# Patient Record
Sex: MALE | Race: WHITE | NOT HISPANIC OR LATINO | Employment: OTHER | URBAN - METROPOLITAN AREA
[De-identification: names, ages, dates, MRNs, and addresses within clinical notes are randomized per-mention and may not be internally consistent; named-entity substitution may affect disease eponyms.]

---

## 2017-02-02 ENCOUNTER — HOSPITAL ENCOUNTER (OUTPATIENT)
Facility: MEDICAL CENTER | Age: 82
End: 2017-02-02
Attending: SURGERY
Payer: MEDICARE

## 2017-02-02 PROCEDURE — 88304 TISSUE EXAM BY PATHOLOGIST: CPT

## 2017-02-03 LAB — PATHOLOGY CONSULT NOTE: NORMAL

## 2017-05-23 DIAGNOSIS — I11.9 BENIGN HYPERTENSIVE HEART DISEASE WITHOUT HEART FAILURE: ICD-10-CM

## 2017-05-23 DIAGNOSIS — I25.10 ATHEROSCLEROSIS OF NATIVE CORONARY ARTERY OF NATIVE HEART WITHOUT ANGINA PECTORIS: ICD-10-CM

## 2017-05-23 DIAGNOSIS — N18.30 CKD (CHRONIC KIDNEY DISEASE) STAGE 3, GFR 30-59 ML/MIN (HCC): ICD-10-CM

## 2017-05-23 DIAGNOSIS — E78.00 HYPERCHOLESTEROLEMIA: ICD-10-CM

## 2017-05-26 ENCOUNTER — HOSPITAL ENCOUNTER (OUTPATIENT)
Dept: LAB | Facility: MEDICAL CENTER | Age: 82
End: 2017-05-26
Attending: INTERNAL MEDICINE
Payer: MEDICARE

## 2017-05-26 DIAGNOSIS — I25.10 ATHEROSCLEROSIS OF NATIVE CORONARY ARTERY OF NATIVE HEART WITHOUT ANGINA PECTORIS: ICD-10-CM

## 2017-05-26 DIAGNOSIS — E78.00 HYPERCHOLESTEROLEMIA: ICD-10-CM

## 2017-05-26 DIAGNOSIS — N18.30 CKD (CHRONIC KIDNEY DISEASE) STAGE 3, GFR 30-59 ML/MIN (HCC): ICD-10-CM

## 2017-05-26 DIAGNOSIS — I11.9 BENIGN HYPERTENSIVE HEART DISEASE WITHOUT HEART FAILURE: ICD-10-CM

## 2017-05-26 LAB
ALBUMIN SERPL BCP-MCNC: 3.9 G/DL (ref 3.2–4.9)
ALBUMIN/GLOB SERPL: 1.3 G/DL
ALP SERPL-CCNC: 56 U/L (ref 30–99)
ALT SERPL-CCNC: 15 U/L (ref 2–50)
ANION GAP SERPL CALC-SCNC: 5 MMOL/L (ref 0–11.9)
AST SERPL-CCNC: 18 U/L (ref 12–45)
BASOPHILS # BLD AUTO: 1.1 % (ref 0–1.8)
BASOPHILS # BLD: 0.05 K/UL (ref 0–0.12)
BILIRUB SERPL-MCNC: 0.9 MG/DL (ref 0.1–1.5)
BUN SERPL-MCNC: 45 MG/DL (ref 8–22)
CALCIUM SERPL-MCNC: 9.3 MG/DL (ref 8.5–10.5)
CHLORIDE SERPL-SCNC: 108 MMOL/L (ref 96–112)
CHOLEST SERPL-MCNC: 131 MG/DL (ref 100–199)
CO2 SERPL-SCNC: 25 MMOL/L (ref 20–33)
CREAT SERPL-MCNC: 1.86 MG/DL (ref 0.5–1.4)
EOSINOPHIL # BLD AUTO: 0.34 K/UL (ref 0–0.51)
EOSINOPHIL NFR BLD: 7.1 % (ref 0–6.9)
ERYTHROCYTE [DISTWIDTH] IN BLOOD BY AUTOMATED COUNT: 46 FL (ref 35.9–50)
GFR SERPL CREATININE-BSD FRML MDRD: 34 ML/MIN/1.73 M 2
GLOBULIN SER CALC-MCNC: 2.9 G/DL (ref 1.9–3.5)
GLUCOSE SERPL-MCNC: 87 MG/DL (ref 65–99)
HCT VFR BLD AUTO: 45.2 % (ref 42–52)
HDLC SERPL-MCNC: 58 MG/DL
HGB BLD-MCNC: 15 G/DL (ref 14–18)
IMM GRANULOCYTES # BLD AUTO: 0 K/UL (ref 0–0.11)
IMM GRANULOCYTES NFR BLD AUTO: 0 % (ref 0–0.9)
LDLC SERPL CALC-MCNC: 58 MG/DL
LYMPHOCYTES # BLD AUTO: 1.39 K/UL (ref 1–4.8)
LYMPHOCYTES NFR BLD: 29.2 % (ref 22–41)
MCH RBC QN AUTO: 32.9 PG (ref 27–33)
MCHC RBC AUTO-ENTMCNC: 33.2 G/DL (ref 33.7–35.3)
MCV RBC AUTO: 99.1 FL (ref 81.4–97.8)
MONOCYTES # BLD AUTO: 0.35 K/UL (ref 0–0.85)
MONOCYTES NFR BLD AUTO: 7.4 % (ref 0–13.4)
NEUTROPHILS # BLD AUTO: 2.63 K/UL (ref 1.82–7.42)
NEUTROPHILS NFR BLD: 55.2 % (ref 44–72)
NRBC # BLD AUTO: 0 K/UL
NRBC BLD AUTO-RTO: 0 /100 WBC
PLATELET # BLD AUTO: 99 K/UL (ref 164–446)
PMV BLD AUTO: 12.3 FL (ref 9–12.9)
POTASSIUM SERPL-SCNC: 4.3 MMOL/L (ref 3.6–5.5)
PROT SERPL-MCNC: 6.8 G/DL (ref 6–8.2)
RBC # BLD AUTO: 4.56 M/UL (ref 4.7–6.1)
SODIUM SERPL-SCNC: 138 MMOL/L (ref 135–145)
TRIGL SERPL-MCNC: 74 MG/DL (ref 0–149)
WBC # BLD AUTO: 4.8 K/UL (ref 4.8–10.8)

## 2017-05-26 PROCEDURE — 80053 COMPREHEN METABOLIC PANEL: CPT

## 2017-05-26 PROCEDURE — 85025 COMPLETE CBC W/AUTO DIFF WBC: CPT

## 2017-05-26 PROCEDURE — 36415 COLL VENOUS BLD VENIPUNCTURE: CPT

## 2017-05-26 PROCEDURE — 80061 LIPID PANEL: CPT

## 2017-05-30 ENCOUNTER — OFFICE VISIT (OUTPATIENT)
Dept: CARDIOLOGY | Facility: MEDICAL CENTER | Age: 82
End: 2017-05-30
Payer: MEDICARE

## 2017-05-30 VITALS — HEART RATE: 56 BPM | SYSTOLIC BLOOD PRESSURE: 125 MMHG | DIASTOLIC BLOOD PRESSURE: 60 MMHG

## 2017-05-30 DIAGNOSIS — I73.9 PERIPHERAL VASCULAR DISEASE (HCC): ICD-10-CM

## 2017-05-30 DIAGNOSIS — I25.10 ATHEROSCLEROSIS OF NATIVE CORONARY ARTERY OF NATIVE HEART WITHOUT ANGINA PECTORIS: ICD-10-CM

## 2017-05-30 DIAGNOSIS — Z95.5 PRESENCE OF BARE METAL STENT IN LEFT CIRCUMFLEX CORONARY ARTERY: ICD-10-CM

## 2017-05-30 DIAGNOSIS — I11.9 BENIGN HYPERTENSIVE HEART DISEASE WITHOUT HEART FAILURE: ICD-10-CM

## 2017-05-30 PROCEDURE — 1101F PT FALLS ASSESS-DOCD LE1/YR: CPT | Mod: 8P | Performed by: INTERNAL MEDICINE

## 2017-05-30 PROCEDURE — 99213 OFFICE O/P EST LOW 20 MIN: CPT | Performed by: INTERNAL MEDICINE

## 2017-05-30 PROCEDURE — 4040F PNEUMOC VAC/ADMIN/RCVD: CPT | Mod: 8P | Performed by: INTERNAL MEDICINE

## 2017-05-30 PROCEDURE — G8598 ASA/ANTIPLAT THER USED: HCPCS | Performed by: INTERNAL MEDICINE

## 2017-05-30 PROCEDURE — G8432 DEP SCR NOT DOC, RNG: HCPCS | Performed by: INTERNAL MEDICINE

## 2017-05-30 PROCEDURE — G8421 BMI NOT CALCULATED: HCPCS | Performed by: INTERNAL MEDICINE

## 2017-05-30 PROCEDURE — 1036F TOBACCO NON-USER: CPT | Performed by: INTERNAL MEDICINE

## 2017-05-30 ASSESSMENT — ENCOUNTER SYMPTOMS
COUGH: 0
MYALGIAS: 0
WHEEZING: 0
PND: 0
ORTHOPNEA: 0
FALLS: 0

## 2017-05-30 ASSESSMENT — LIFESTYLE VARIABLES: SUBSTANCE_ABUSE: 0

## 2017-05-30 NOTE — MR AVS SNAPSHOT
Luis Antonio Turner MD   2017 1:20 PM   Office Visit   MRN: [id number]    Department:  Heart [facility]   Dept Phone:  [phone]    Description:  Male : 1928   Provider:  Luis Antonio Lugo M.D.           Allergies as of 2017     No Known Allergies      You were diagnosed with     Peripheral vascular disease (CMS-HCC)   [735797]         Vital Signs     Smoking Status                   Never Smoker            Basic Information     Date Of Birth Sex Race Ethnicity Preferred Language    1928 Male White Non- English      Your appointments     Sep 05, 2017 11:00 AM   FOLLOW UP with Luis Antonio Lugo M.D.   [facility]    [address]  [address]  [facility] [id number] [phone]              Problem List              ICD-10-CM Priority Class Noted - Resolved    Benign hypertensive heart disease without heart failure I11.9   Unknown - Present    Coronary atherosclerosis of native coronary artery I25.10   Unknown - Present    Peripheral vascular disease (CMS-HCC) I73.9   Unknown - Present    Malignant neoplasm of prostate (CMS-HCC) C61   Unknown - Present    Presence of bare metal stent in left circumflex coronary artery, 4mm Duet, 99 Z95.5   2011 - Present    CKD (chronic kidney disease) stage 3, GFR 30-59 ml/min N18.3   2012 - Present    Hypercholesterolemia E78.00   2012 - Present    Palpitations R00.2   2012 - Present    PVD (peripheral vascular disease) (CMS-HCC) I73.9   2012 - Present    Prostate cancer (CMS-HCC) C61   2012 - Present    Carotid artery bruit R09.89   4/15/2014 - Present    Other chest pain R07.89   2015 - Present    Aortic valve sclerosis I35.8   2016 - Present    Mild mitral regurgitation I34.0   2016 - Present      Health Maintenance        Date Due Completion Dates    IMM DTaP/Tdap/Td Vaccine (1 - Tdap) 1947 ---    COLONOSCOPY 1978  ---    IMM ZOSTER VACCINE 1/31/1988 ---    IMM PNEUMOCOCCAL 65+ (ADULT) LOW/MEDIUM RISK SERIES (1 of 2 - PCV13) 1/31/1993 ---            Current Immunizations     No immunizations on file.      Below and/or attached are the medications your provider expects you to take. Review all of your home medications and newly ordered medications with your provider and/or pharmacist. Follow medication instructions as directed by your provider and/or pharmacist. Please keep your medication list with you and share with your provider. Update the information when medications are discontinued, doses are changed, or new medications (including over-the-counter products) are added; and carry medication information at all times in the event of emergency situations     Allergies:  No Known Allergies          Medications  Valid as of: May 30, 2017 -  2:02 PM    Generic Name Brand Name Tablet Size Instructions for use    Aspirin (Tablet Delayed Response) ECOTRIN 81 MG Take 81 mg by mouth every day.        Atorvastatin Calcium (Tab) LIPITOR 20 MG TAKE 1 TABLET BY MOUTH EVERY DAY        Cholecalciferol   Take 1,000 Units by mouth every Monday, Wednesday, and Friday.        HydroCHLOROthiazide (Tab) HYDRODIURIL 12.5 MG Take 1 Tab by mouth every day.        Losartan Potassium (Tab) COZAAR 50 MG TAKE 1 TABLET BY MOUTH EVERY DAY        Lutein   Take 20 mg by mouth every day.        .                 Medicines prescribed today were sent to:     [facility] [place], [place] - [address] [address]    Phone: [phone] Fax: [phone]    Open 24 Hours?: No    [facility] # [id number] - [place], [place] - [address] [facility]    [address] [facility] [address]    Phone: [phone] Fax: [phone]    Open 24 Hours?: No      Medication refill instructions:       If your prescription bottle indicates you have medication refills left, it is not necessary to call your provider’s office. Please contact your  pharmacy and they will refill your medication.    If your prescription bottle indicates you do not have any refills left, you may request refills at any time through one of the following ways: The online Hydrocapsule system (except Urgent Care), by calling your provider’s office, or by asking your pharmacy to contact your provider’s office with a refill request. Medication refills are processed only during regular business hours and may not be available until the next business day. Your provider may request additional information or to have a follow-up visit with you prior to refilling your medication.   *Please Note: Medication refills are assigned a new Rx number when refilled electronically. Your pharmacy may indicate that no refills were authorized even though a new prescription for the same medication is available at the pharmacy. Please request the medicine by name with the pharmacy before contacting your provider for a refill.        Your To Do List     Future Labs/Procedures Complete By Expires    ABDOMINAL AORTA ULTRASOUND  As directed 5/31/2018         Hydrocapsule Access Code: [email or web address]  Expires: 6/29/2017  2:02 PM    Hydrocapsule  A secure, online tool to manage your health information     [employer]’s Hydrocapsule® is a secure, online tool that connects you to your personalized health information from the privacy of your home -- day or night - making it very easy for you to manage your healthcare. Once the activation process is completed, you can even access your medical information using the Hydrocapsule sylvia, which is available for free in the Apple Sylvia store or Google Play store.     Hydrocapsule provides the following levels of access (as shown below):   My Chart Features   [facility]  Specialists [facility]  Urgent  Care Non-[facility]  Primary Care  Doctor   Email your healthcare team securely and privately 24/7 X X X    Manage appointments: schedule your next appointment; view details of past/upcoming  appointments X      Request prescription refills. X      View recent personal medical records, including lab and immunizations X X X X   View health record, including health history, allergies, medications X X X X   Read reports about your outpatient visits, procedures, consult and ER notes X X X X   See your discharge summary, which is a recap of your hospital and/or ER visit that includes your diagnosis, lab results, and care plan. X X       How to register for Ideabove:  1. Go to  [email or web address].  2. Click on the Sign Up Now box, which takes you to the New Member Sign Up page. You will need to provide the following information:  a. Enter your Ideabove Access Code exactly as it appears at the top of this page. (You will not need to use this code after you’ve completed the sign-up process. If you do not sign up before the expiration date, you must request a new code.)   b. Enter your date of birth.   c. Enter your home email address.   d. Click Submit, and follow the next screen’s instructions.  3. Create a Ideabove ID. This will be your Ideabove login ID and cannot be changed, so think of one that is secure and easy to remember.  4. Create a Ideabove password. You can change your password at any time.  5. Enter your Password Reset Question and Answer. This can be used at a later time if you forget your password.   6. Enter your e-mail address. This allows you to receive e-mail notifications when new information is available in Ideabove.  7. Click Sign Up. You can now view your health information.    For assistance activating your Ideabove account, call [phone]

## 2017-05-30 NOTE — PROGRESS NOTES
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 He does have bilateral buttock claudication with walking 200 feet which resolves with resting. This is bilaterally symmetrical. He does not get calf claudication on either side. He has had no angina. Dyspnea with effort is functional class I-II and stable.    Past Medical History   Diagnosis Date   • Benign hypertensive heart disease without heart failure    • Coronary atherosclerosis of native coronary artery    • Chronic kidney disease, stage III (moderate)    • Palpitations    • Peripheral vascular disease, unspecified (CMS-HCC)    • Malignant neoplasm of prostate (CMS-HCC)    • Stented coronary artery 6/1/2012   • Benign essential HTN 6/1/2012   • CAD (coronary artery disease) 6/1/2012   • CKD (chronic kidney disease) stage 3, GFR 30-59 ml/min 6/1/2012   • Hypercholesterolemia 6/1/2012   • Palpitations 6/1/2012   • PVD (peripheral vascular disease) (CMS-HCC) 6/1/2012   • Prostate cancer (CMS-HCC) 6/1/2012     Past Surgical History   Procedure Laterality Date   • Other cardiac surgery       angioplasty and stent of the proximal circumflex coronary artery for acute MI     Family History   Problem Relation Age of Onset   • Heart Attack Father 60     History   Smoking status   • Never Smoker    Smokeless tobacco   • Never Used     No Known Allergies  Outpatient Encounter Prescriptions as of 5/30/2017   Medication Sig Dispense Refill   • hydrochlorothiazide (HYDRODIURIL) 12.5 MG tablet Take 1 Tab by mouth every day. 90 Tab 3   • losartan (COZAAR) 50 MG Tab TAKE 1 TABLET BY MOUTH EVERY DAY 90 Tab 3   • atorvastatin (LIPITOR) 20 MG Tab TAKE 1 TABLET BY MOUTH EVERY  Tab 3   • LUTEIN Take 20 mg by mouth every day.     • VITAMIN D, CHOLECALCIFEROL, PO Take 1,000 Units by mouth every Monday, Wednesday, and Friday.     • aspirin EC (ECOTRIN) 81 MG TBEC Take 81 mg by mouth every day.       No  facility-administered encounter medications on file as of 5/30/2017.     Review of Systems   Respiratory: Negative for cough and wheezing.    Cardiovascular: Negative for orthopnea and PND.   Musculoskeletal: Negative for myalgias and falls.   Psychiatric/Behavioral: Negative for substance abuse.        Objective:   /60 mmHg  Pulse 56    Physical Exam   Constitutional: He is oriented to person, place, and time. He appears well-developed and well-nourished.   Eyes: Conjunctivae are normal. No scleral icterus.   Neck: No JVD present.   Cardiovascular: Normal rate, regular rhythm, S1 normal and S2 normal.  Exam reveals no gallop.    Murmur (2/6 systolic ejection) heard.  Pulses:       Radial pulses are 2+ on the right side, and 2+ on the left side.        Femoral pulses are 2+ on the right side, and 2+ on the left side with bruit.       Posterior tibial pulses are 2+ on the right side, and 1+ on the left side.   Musculoskeletal: He exhibits no edema.   Neurological: He is alert and oriented to person, place, and time.   Skin: Skin is warm and dry.   Psychiatric: He has a normal mood and affect. His behavior is normal. Judgment and thought content normal.       Assessment:     1. Atherosclerosis of native coronary artery of native heart without angina pectoris     2. Presence of bare metal stent in left circumflex coronary artery, 4mm Duet, 4/9/99     3. Benign hypertensive heart disease without heart failure     4. Peripheral vascular disease (CMS-[facility])  ABDOMINAL AORTA ULTRASOUND   His coronary status is stable. Blood pressure control is very irritable and labile but generally good. Fatigue with effort is stable. The buttock claudication may be due to iliac arterial disease since it is symmetrical and he does not get lower extremity claudication despite the asymmetrical pulses.  Medical Decision Making:  Today's Assessment / Status / Plan:     Obtain the abdominal vascular ultrasound to evaluate the proximal  iliac arteries and his abdominal aorta. Continue the current cardiovascular regimen. Cardiology follow up in  3 month(s) and  sooner if needed for any change.   Lab before next visit.

## 2017-06-09 DIAGNOSIS — I10 ESSENTIAL HYPERTENSION: ICD-10-CM

## 2017-06-09 RX ORDER — LOSARTAN POTASSIUM 50 MG/1
50 TABLET ORAL
Qty: 90 TAB | Refills: 1 | OUTPATIENT
Start: 2017-06-09 | End: 2017-12-20 | Stop reason: SDUPTHER

## 2017-08-14 DIAGNOSIS — I73.9 PVD (PERIPHERAL VASCULAR DISEASE) (HCC): ICD-10-CM

## 2017-08-16 ENCOUNTER — APPOINTMENT (OUTPATIENT)
Dept: RADIOLOGY | Facility: MEDICAL CENTER | Age: 82
End: 2017-08-16
Attending: EMERGENCY MEDICINE
Payer: MEDICARE

## 2017-08-16 ENCOUNTER — APPOINTMENT (OUTPATIENT)
Dept: RADIOLOGY | Facility: MEDICAL CENTER | Age: 82
End: 2017-08-16
Attending: HOSPITALIST
Payer: MEDICARE

## 2017-08-16 ENCOUNTER — HOSPITAL ENCOUNTER (OUTPATIENT)
Facility: MEDICAL CENTER | Age: 82
End: 2017-08-18
Attending: EMERGENCY MEDICINE | Admitting: HOSPITALIST
Payer: MEDICARE

## 2017-08-16 ENCOUNTER — RESOLUTE PROFESSIONAL BILLING HOSPITAL PROF FEE (OUTPATIENT)
Dept: HOSPITALIST | Facility: MEDICAL CENTER | Age: 82
End: 2017-08-16
Payer: MEDICARE

## 2017-08-16 DIAGNOSIS — R27.0 ATAXIA: ICD-10-CM

## 2017-08-16 LAB
ALBUMIN SERPL BCP-MCNC: 3.7 G/DL (ref 3.2–4.9)
ALBUMIN/GLOB SERPL: 1.5 G/DL
ALP SERPL-CCNC: 53 U/L (ref 30–99)
ALT SERPL-CCNC: 11 U/L (ref 2–50)
ANION GAP SERPL CALC-SCNC: 10 MMOL/L (ref 0–11.9)
APPEARANCE UR: CLEAR
APTT PPP: 24.2 SEC (ref 24.7–36)
AST SERPL-CCNC: 18 U/L (ref 12–45)
BASOPHILS # BLD AUTO: 0.6 % (ref 0–1.8)
BASOPHILS # BLD: 0.03 K/UL (ref 0–0.12)
BILIRUB SERPL-MCNC: 0.9 MG/DL (ref 0.1–1.5)
BILIRUB UR QL STRIP.AUTO: NEGATIVE
BUN SERPL-MCNC: 36 MG/DL (ref 8–22)
CALCIUM SERPL-MCNC: 9 MG/DL (ref 8.5–10.5)
CHLORIDE SERPL-SCNC: 109 MMOL/L (ref 96–112)
CO2 SERPL-SCNC: 22 MMOL/L (ref 20–33)
COLOR UR: YELLOW
CREAT SERPL-MCNC: 1.58 MG/DL (ref 0.5–1.4)
CULTURE IF INDICATED INDCX: NO UA CULTURE
EOSINOPHIL # BLD AUTO: 0.45 K/UL (ref 0–0.51)
EOSINOPHIL NFR BLD: 8.9 % (ref 0–6.9)
ERYTHROCYTE [DISTWIDTH] IN BLOOD BY AUTOMATED COUNT: 45.9 FL (ref 35.9–50)
EST. AVERAGE GLUCOSE BLD GHB EST-MCNC: 117 MG/DL
GFR SERPL CREATININE-BSD FRML MDRD: 41 ML/MIN/1.73 M 2
GLOBULIN SER CALC-MCNC: 2.4 G/DL (ref 1.9–3.5)
GLUCOSE SERPL-MCNC: 88 MG/DL (ref 65–99)
GLUCOSE UR STRIP.AUTO-MCNC: NEGATIVE MG/DL
HBA1C MFR BLD: 5.7 % (ref 0–5.6)
HCT VFR BLD AUTO: 40.7 % (ref 42–52)
HGB BLD-MCNC: 13.9 G/DL (ref 14–18)
IMM GRANULOCYTES # BLD AUTO: 0.01 K/UL (ref 0–0.11)
IMM GRANULOCYTES NFR BLD AUTO: 0.2 % (ref 0–0.9)
INR PPP: 1 (ref 0.87–1.13)
KETONES UR STRIP.AUTO-MCNC: NEGATIVE MG/DL
LEUKOCYTE ESTERASE UR QL STRIP.AUTO: NEGATIVE
LYMPHOCYTES # BLD AUTO: 1.74 K/UL (ref 1–4.8)
LYMPHOCYTES NFR BLD: 34.3 % (ref 22–41)
MCH RBC QN AUTO: 33.5 PG (ref 27–33)
MCHC RBC AUTO-ENTMCNC: 34.2 G/DL (ref 33.7–35.3)
MCV RBC AUTO: 98.1 FL (ref 81.4–97.8)
MICRO URNS: NORMAL
MONOCYTES # BLD AUTO: 0.42 K/UL (ref 0–0.85)
MONOCYTES NFR BLD AUTO: 8.3 % (ref 0–13.4)
NEUTROPHILS # BLD AUTO: 2.42 K/UL (ref 1.82–7.42)
NEUTROPHILS NFR BLD: 47.7 % (ref 44–72)
NITRITE UR QL STRIP.AUTO: NEGATIVE
NRBC # BLD AUTO: 0 K/UL
NRBC BLD AUTO-RTO: 0 /100 WBC
PH UR STRIP.AUTO: 7 [PH]
PLATELET # BLD AUTO: 101 K/UL (ref 164–446)
PMV BLD AUTO: 12 FL (ref 9–12.9)
POTASSIUM SERPL-SCNC: 3.9 MMOL/L (ref 3.6–5.5)
PROT SERPL-MCNC: 6.1 G/DL (ref 6–8.2)
PROT UR QL STRIP: NEGATIVE MG/DL
PROTHROMBIN TIME: 13.5 SEC (ref 12–14.6)
RBC # BLD AUTO: 4.15 M/UL (ref 4.7–6.1)
RBC UR QL AUTO: NEGATIVE
SODIUM SERPL-SCNC: 141 MMOL/L (ref 135–145)
SP GR UR STRIP.AUTO: 1.01
TROPONIN I SERPL-MCNC: 0.02 NG/ML (ref 0–0.04)
UROBILINOGEN UR STRIP.AUTO-MCNC: 1 MG/DL
WBC # BLD AUTO: 5.1 K/UL (ref 4.8–10.8)

## 2017-08-16 PROCEDURE — 700102 HCHG RX REV CODE 250 W/ 637 OVERRIDE(OP): Performed by: NURSE PRACTITIONER

## 2017-08-16 PROCEDURE — 93005 ELECTROCARDIOGRAM TRACING: CPT | Performed by: EMERGENCY MEDICINE

## 2017-08-16 PROCEDURE — G0378 HOSPITAL OBSERVATION PER HR: HCPCS

## 2017-08-16 PROCEDURE — 99220 PR INITIAL OBSERVATION CARE,LEVL III: CPT | Performed by: HOSPITALIST

## 2017-08-16 PROCEDURE — 99285 EMERGENCY DEPT VISIT HI MDM: CPT

## 2017-08-16 PROCEDURE — 83036 HEMOGLOBIN GLYCOSYLATED A1C: CPT

## 2017-08-16 PROCEDURE — 84484 ASSAY OF TROPONIN QUANT: CPT

## 2017-08-16 PROCEDURE — 81003 URINALYSIS AUTO W/O SCOPE: CPT

## 2017-08-16 PROCEDURE — 700102 HCHG RX REV CODE 250 W/ 637 OVERRIDE(OP): Performed by: HOSPITALIST

## 2017-08-16 PROCEDURE — 80053 COMPREHEN METABOLIC PANEL: CPT

## 2017-08-16 PROCEDURE — 71010 DX-CHEST-PORTABLE (1 VIEW): CPT

## 2017-08-16 PROCEDURE — 70450 CT HEAD/BRAIN W/O DYE: CPT

## 2017-08-16 PROCEDURE — 85730 THROMBOPLASTIN TIME PARTIAL: CPT

## 2017-08-16 PROCEDURE — A9270 NON-COVERED ITEM OR SERVICE: HCPCS | Performed by: HOSPITALIST

## 2017-08-16 PROCEDURE — 85610 PROTHROMBIN TIME: CPT

## 2017-08-16 PROCEDURE — A9270 NON-COVERED ITEM OR SERVICE: HCPCS | Performed by: NURSE PRACTITIONER

## 2017-08-16 PROCEDURE — 85025 COMPLETE CBC W/AUTO DIFF WBC: CPT

## 2017-08-16 RX ORDER — LOSARTAN POTASSIUM 50 MG/1
50 TABLET ORAL
Status: DISCONTINUED | OUTPATIENT
Start: 2017-08-16 | End: 2017-08-16

## 2017-08-16 RX ORDER — AMOXICILLIN 250 MG
2 CAPSULE ORAL 2 TIMES DAILY
Status: DISCONTINUED | OUTPATIENT
Start: 2017-08-16 | End: 2017-08-18 | Stop reason: HOSPADM

## 2017-08-16 RX ORDER — POTASSIUM CHLORIDE 600 MG/1
8 TABLET, FILM COATED, EXTENDED RELEASE ORAL
COMMUNITY
End: 2018-01-03 | Stop reason: SDUPTHER

## 2017-08-16 RX ORDER — LOSARTAN POTASSIUM 50 MG/1
50 TABLET ORAL
Status: DISCONTINUED | OUTPATIENT
Start: 2017-08-16 | End: 2017-08-18 | Stop reason: HOSPADM

## 2017-08-16 RX ORDER — ACETAMINOPHEN 325 MG/1
650 TABLET ORAL EVERY 6 HOURS PRN
Status: DISCONTINUED | OUTPATIENT
Start: 2017-08-16 | End: 2017-08-18 | Stop reason: HOSPADM

## 2017-08-16 RX ORDER — B-COMPLEX WITH VITAMIN C
1 TABLET ORAL DAILY
Status: ON HOLD | COMMUNITY
End: 2023-01-01

## 2017-08-16 RX ORDER — ONDANSETRON 2 MG/ML
4 INJECTION INTRAMUSCULAR; INTRAVENOUS EVERY 4 HOURS PRN
Status: DISCONTINUED | OUTPATIENT
Start: 2017-08-16 | End: 2017-08-18 | Stop reason: HOSPADM

## 2017-08-16 RX ORDER — HYDRALAZINE HYDROCHLORIDE 20 MG/ML
10 INJECTION INTRAMUSCULAR; INTRAVENOUS
Status: DISCONTINUED | OUTPATIENT
Start: 2017-08-16 | End: 2017-08-18 | Stop reason: HOSPADM

## 2017-08-16 RX ORDER — NAPROXEN SODIUM 220 MG
220 TABLET ORAL
COMMUNITY
End: 2018-04-09

## 2017-08-16 RX ORDER — ONDANSETRON 4 MG/1
4 TABLET, ORALLY DISINTEGRATING ORAL EVERY 4 HOURS PRN
Status: DISCONTINUED | OUTPATIENT
Start: 2017-08-16 | End: 2017-08-18 | Stop reason: HOSPADM

## 2017-08-16 RX ORDER — BISACODYL 10 MG
10 SUPPOSITORY, RECTAL RECTAL
Status: DISCONTINUED | OUTPATIENT
Start: 2017-08-16 | End: 2017-08-18 | Stop reason: HOSPADM

## 2017-08-16 RX ORDER — ASCORBIC ACID 500 MG
500 TABLET ORAL DAILY
Status: ON HOLD | COMMUNITY
End: 2023-01-01

## 2017-08-16 RX ORDER — KETOTIFEN FUMARATE 0.25 MG/ML
1 SOLUTION/ DROPS OPHTHALMIC DAILY
COMMUNITY
End: 2020-08-26

## 2017-08-16 RX ORDER — KETOTIFEN FUMARATE 0.25 MG/ML
1 SOLUTION/ DROPS OPHTHALMIC DAILY
Status: DISCONTINUED | OUTPATIENT
Start: 2017-08-16 | End: 2017-08-16

## 2017-08-16 RX ORDER — LABETALOL HYDROCHLORIDE 5 MG/ML
10 INJECTION, SOLUTION INTRAVENOUS EVERY 4 HOURS PRN
Status: DISCONTINUED | OUTPATIENT
Start: 2017-08-16 | End: 2017-08-18 | Stop reason: HOSPADM

## 2017-08-16 RX ORDER — POLYETHYLENE GLYCOL 3350 17 G/17G
1 POWDER, FOR SOLUTION ORAL
Status: DISCONTINUED | OUTPATIENT
Start: 2017-08-16 | End: 2017-08-18 | Stop reason: HOSPADM

## 2017-08-16 RX ORDER — ATORVASTATIN CALCIUM 20 MG/1
20 TABLET, FILM COATED ORAL
Status: DISCONTINUED | OUTPATIENT
Start: 2017-08-16 | End: 2017-08-18 | Stop reason: HOSPADM

## 2017-08-16 RX ADMIN — ASPIRIN 325 MG: 325 TABLET, DELAYED RELEASE ORAL at 12:41

## 2017-08-16 RX ADMIN — LOSARTAN POTASSIUM 50 MG: 50 TABLET, FILM COATED ORAL at 20:09

## 2017-08-16 RX ADMIN — ATORVASTATIN CALCIUM 20 MG: 20 TABLET, FILM COATED ORAL at 20:09

## 2017-08-16 ASSESSMENT — LIFESTYLE VARIABLES
AVERAGE NUMBER OF DAYS PER WEEK YOU HAVE A DRINK CONTAINING ALCOHOL: 2
TOTAL SCORE: 0
CONSUMPTION TOTAL: NEGATIVE
DO YOU DRINK ALCOHOL: YES
EVER FELT BAD OR GUILTY ABOUT YOUR DRINKING: NO
EVER_SMOKED: NEVER
EVER HAD A DRINK FIRST THING IN THE MORNING TO STEADY YOUR NERVES TO GET RID OF A HANGOVER: NO
HAVE PEOPLE ANNOYED YOU BY CRITICIZING YOUR DRINKING: NO
HAVE YOU EVER FELT YOU SHOULD CUT DOWN ON YOUR DRINKING: NO
TOTAL SCORE: 0
HOW MANY TIMES IN THE PAST YEAR HAVE YOU HAD 5 OR MORE DRINKS IN A DAY: 0
ON A TYPICAL DAY WHEN YOU DRINK ALCOHOL HOW MANY DRINKS DO YOU HAVE: 1
TOTAL SCORE: 0

## 2017-08-16 ASSESSMENT — PAIN SCALES - GENERAL
PAINLEVEL_OUTOF10: 0

## 2017-08-16 ASSESSMENT — PATIENT HEALTH QUESTIONNAIRE - PHQ9
SUM OF ALL RESPONSES TO PHQ9 QUESTIONS 1 AND 2: 0
SUM OF ALL RESPONSES TO PHQ QUESTIONS 1-9: 0
1. LITTLE INTEREST OR PLEASURE IN DOING THINGS: NOT AT ALL

## 2017-08-16 NOTE — DISCHARGE PLANNING
Care Transition Team Assessment    Information Source  Orientation : Oriented x 4  Information Given By: Caregiver  Who is responsible for making decisions for patient? : Patient    Readmission Evaluation  Is this a readmission?: No    Elopement Risk  Legal Hold: No  Ambulatory or Self Mobile in Wheelchair: No-Not an Elopement Risk    Interdisciplinary Discharge Planning  Does Admitting Nurse Feel This Could be a Complex Discharge?: No  Lives with - Patient's Self Care Capacity: Spouse  Support Systems: Family Member(s)  Housing / Facility: [address]  Do You Take your Prescribed Medications Regularly: Yes  Able to Return to Previous ADL's: Yes  Prior Services: None  Patient Expects to be Discharged to::  (home, wife, family, no needs)    Discharge Preparedness  What is your plan after discharge?:  (above)         Finances  Financial Barriers to Discharge: No    Vision / Hearing Impairment  Vision Impairment : No  Hearing Impairment : Yes              Domestic Abuse  Have you ever been the victim of abuse or violence?: No  Physical Abuse or Sexual Abuse: No    Psychological Assessment  History of Substance Abuse: None

## 2017-08-16 NOTE — ED NOTES
BIB home woke up at about 0600 w ataxia, has hx of same , worse than usual, vertebral artery ? partial occlusion, no speech, visual deficits,or unilateral weakness, NIHSS 0. Iv estab prior to arrival, labs drawn, monitor shows sr sb, 50's, denies ha, recent illness

## 2017-08-16 NOTE — ED PROVIDER NOTES
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 Patient is a retired physician. He reports that he woke up this morning at about 6 AM difficulty walking. Going to the side. He seemed to be a little perform his normal morning routine, but doesn't feel like his as coordinated as normal. He has a history of a slow flowing right vertebral artery and was worried that he might be having a stroke. He denies having a headache. No focal weakness or numbness. No slurred speech or confusion. He hasn't had fevers or chills. No congestion or cough.    REVIEW OF SYSTEMS  As per HPI, otherwise a 10 point review of systems is negative    PAST MEDICAL HISTORY  Past Medical History   Diagnosis Date   • Benign hypertensive heart disease without heart failure    • Coronary atherosclerosis of native coronary artery    • Chronic kidney disease, stage III (moderate)    • Palpitations    • Peripheral vascular disease, unspecified (CMS-HCC)    • Malignant neoplasm of prostate (CMS-HCC)    • Stented coronary artery 6/1/2012   • Benign essential HTN 6/1/2012   • CAD (coronary artery disease) 6/1/2012   • CKD (chronic kidney disease) stage 3, GFR 30-59 ml/min 6/1/2012   • Hypercholesterolemia 6/1/2012   • Palpitations 6/1/2012   • PVD (peripheral vascular disease) (CMS-HCC) 6/1/2012   • Prostate cancer (CMS-HCC) 6/1/2012       SOCIAL HISTORY  Social History   Substance Use Topics   • Smoking status: Never Smoker    • Smokeless tobacco: Never Used   • Alcohol Use: Yes       SURGICAL HISTORY  Past Surgical History   Procedure Laterality Date   • Other cardiac surgery       angioplasty and stent of the proximal circumflex coronary artery for acute MI       CURRENT MEDICATIONS  Home Medications     **Home medications have not yet been reviewed for this encounter**     "      ALLERGIES  No Known Allergies    PHYSICAL EXAM  VITAL SIGNS: /66 mmHg  Pulse 60  Temp(Src) 36.7 °C (98.1 °F)  Resp 13  Ht 1.727 m (5' 8\")  Wt 79.379 kg (175 lb)  BMI 26.61 kg/m2  SpO2 97%   Constitutional: Awake and alert  HENT: Normal inspection  Eyes: Pupils are small and reactive bilaterally. Questionable rightward gaze horizontal nystagmus.  Neck: Supple  Cardiovascular: Normal heart rate, Normal rhythm.  Thorax & Lungs: No respiratory distress  Abdomen: Bowel sounds normal, soft, non-distended, nontender, no mass  Skin: Warm, Dry, No rash.   Extremities: Well perfused  Neurologic: Cranial nerves II through XII are intact. Motor 5/5 throughout. Normal sensation. Finger to nose testing is normal although he does have a mild tremor.      RADIOLOGY/PROCEDURES  DX-CHEST-PORTABLE (1 VIEW)   Final Result      Scattered linear atelectasis.      CT-HEAD W/O   Final Result      1.  Diffuse atrophy and white matter changes.   2.  No acute intracranial hemorrhage or territorial infarct.   3.  New complete opacification of RIGHT sphenoid sinus, likely chronic.   4.  Mild chronic inferior frontal and ethmoid sinus disease.         Imaging is interpreted by radiologist    Labs:  Results for orders placed or performed during the hospital encounter of 08/16/17   CBC WITH DIFFERENTIAL   Result Value Ref Range    WBC 5.1 4.8 - 10.8 K/uL    RBC 4.15 (L) 4.70 - 6.10 M/uL    Hemoglobin 13.9 (L) 14.0 - 18.0 g/dL    Hematocrit 40.7 (L) 42.0 - 52.0 %    MCV 98.1 (H) 81.4 - 97.8 fL    MCH 33.5 (H) 27.0 - 33.0 pg    MCHC 34.2 33.7 - 35.3 g/dL    RDW 45.9 35.9 - 50.0 fL    Platelet Count 101 (L) 164 - 446 K/uL    MPV 12.0 9.0 - 12.9 fL    Neutrophils-Polys 47.70 44.00 - 72.00 %    Lymphocytes 34.30 22.00 - 41.00 %    Monocytes 8.30 0.00 - 13.40 %    Eosinophils 8.90 (H) 0.00 - 6.90 %    Basophils 0.60 0.00 - 1.80 %    Immature Granulocytes 0.20 0.00 - 0.90 %    Nucleated RBC 0.00 /100 WBC    Neutrophils (Absolute) 2.42 " 1.82 - 7.42 K/uL    Lymphs (Absolute) 1.74 1.00 - 4.80 K/uL    Monos (Absolute) 0.42 0.00 - 0.85 K/uL    Eos (Absolute) 0.45 0.00 - 0.51 K/uL    Baso (Absolute) 0.03 0.00 - 0.12 K/uL    Immature Granulocytes (abs) 0.01 0.00 - 0.11 K/uL    NRBC (Absolute) 0.00 K/uL   COMP METABOLIC PANEL   Result Value Ref Range    Sodium 141 135 - 145 mmol/L    Potassium 3.9 3.6 - 5.5 mmol/L    Chloride 109 96 - 112 mmol/L    Co2 22 20 - 33 mmol/L    Anion Gap 10.0 0.0 - 11.9    Glucose 88 65 - 99 mg/dL    Bun 36 (H) 8 - 22 mg/dL    Creatinine 1.58 (H) 0.50 - 1.40 mg/dL    Calcium 9.0 8.5 - 10.5 mg/dL    AST(SGOT) 18 12 - 45 U/L    ALT(SGPT) 11 2 - 50 U/L    Alkaline Phosphatase 53 30 - 99 U/L    Total Bilirubin 0.9 0.1 - 1.5 mg/dL    Albumin 3.7 3.2 - 4.9 g/dL    Total Protein 6.1 6.0 - 8.2 g/dL    Globulin 2.4 1.9 - 3.5 g/dL    A-G Ratio 1.5 g/dL   TROPONIN   Result Value Ref Range    Troponin I 0.02 0.00 - 0.04 ng/mL   PROTHROMBIN TIME   Result Value Ref Range    PT 13.5 12.0 - 14.6 sec    INR 1.00 0.87 - 1.13   APTT   Result Value Ref Range    APTT 24.2 (L) 24.7 - 36.0 sec   ESTIMATED GFR   Result Value Ref Range    GFR If African American 50 (A) >60 mL/min/1.73 m 2    GFR If Non  41 (A) >60 mL/min/1.73 m 2         COURSE & MEDICAL DECISION MAKING  Patient presents with subjective ataxia. Has a history of slow flow through his vertebral artery. His multiple vascular risk factors. His NIH score is 0. He is not a alteplase candidate because of this as well as this being wakeup stroke. Workup was obtained and was reassuring remarkable only for opacification of his right sphenoid of unknown significance. This could certainly cause vertiginous type symptoms, but he still needs further stroke evaluation. As per list was paged for admission at 9:36 AM.    FINAL IMPRESSION  1. Ataxia      This dictation was created using voice recognition software. The accuracy of the dictation is limited to the abilities of the  software.  The nursing notes were reviewed and certain aspects of this information were incorporated into this note.      Electronically signed by: Chapin Driver, 8/16/2017 9:31 AM

## 2017-08-16 NOTE — ASSESSMENT & PLAN NOTE
Concern for CNS etiology given his history and risk factors  Continue aspirin and atorvastatin losartan hctz  MRI of the brain- neg  MRA of the intra-and extracranial vessels- r vert artery stenosis/occulsion- no intervention required  Echocardiogram- unremarkable  PT evaluation- rec outpt PT or HH, use cane  Dr Mccoy checking MRI Cervical Spine

## 2017-08-16 NOTE — IP AVS SNAPSHOT
" Home Care Instructions                                                                                                                  Name:Luis Antonio Turner MD  Medical Record Number:[id number]  CSN: [id number]    YOB: 1928   Age: 89 y.o.  Sex: male  HT:1.727 m (5' 8\") WT: 79.379 kg (175 lb)          Admit Date: 8/16/2017     Discharge Date:   Today's Date: 8/18/2017  Attending Doctor:  Bhargav Clarke M.D.                  Allergies:  Review of patient's allergies indicates no known allergies.            Discharge Instructions         Discharge Instructions    Discharged to home by car with relative. Discharged via wheelchair, hospital escort: Yes.  Special equipment needed: Not Applicable    Be sure to schedule a follow-up appointment with your primary care doctor or any specialists as instructed.     Discharge Plan:   Influenza Vaccine Indication: Patient Refuses    I understand that a diet low in cholesterol, fat, and sodium is recommended for good health. Unless I have been given specific instructions below for another diet, I accept this instruction as my diet prescription.   Other diet: Heart healthy     Special Instructions: None    · Is patient discharged on Warfarin / Coumadin?   No     · Is patient Post Blood Transfusion?  No    Depression / Suicide Risk    As [facility] are [facility], it is important to learn how to keep safe from harming yourself.    Recognize the warning signs:  · Abrupt changes in personality, positive or negative- including increase in energy   · Giving away possessions  · Change in eating patterns- significant weight changes-  positive or negative  · Change in sleeping patterns- unable to sleep or sleeping all the time   · Unwillingness or inability to communicate  · Depression  · Unusual sadness, discouragement and loneliness  · Talk of wanting to die  · Neglect of personal appearance   · Rebelliousness- reckless behavior  · Withdrawal " from people/activities they love  · Confusion- inability to concentrate     If you or a loved one observes any of these behaviors or has concerns about self-harm, here's what you can do:  · Talk about it- your feelings and reasons for harming yourself  · Remove any means that you might use to hurt yourself (examples: pills, rope, extension cords, firearm)  · Get professional help from the community (Mental Health, Substance Abuse, psychological counseling)  · Do not be alone:Call your Safe Contact- someone whom you trust who will be there for you.  · Call your local CRISIS HOTLINE 773-8698 or 654-786-7147  · Call your local Children's Mobile Crisis Response Team [place] (296) 086-5789 or www.[employer]  · Call the toll free National Suicide Prevention Hotlines   · National Suicide Prevention Lifeline 466-765-UVCY (5202)  · National Hope Line Network 800-SUICIDE (471-6699)        Your appointments     Aug 22, 2017  2:20 PM   HOSPITAL FOLLOW UP with ANTOINETTE Millard   [facility] ([facility]    [address]  [address]  [place] [id number] [phone]            Sep 05, 2017 11:00 AM   FOLLOW UP with Luis Antonio Lugo M.D.   [facility] (--)    [address]  [address]  [place] [id number] [phone]              Follow-up Information     1. Follow up with JULIANNA Mccoy M.D.. Go on 9/8/2017.    Specialty:  Neurology    Why:  PLEASE ARRIVE AT 8:00AM FOR YOUR APPOINTMENT. THANK YOU    Contact information    [address]  [place] [address]  [phone]          2. Follow up with Luis Antonio Lugo M.D.. Go on 8/5/2017.    Specialty:  Cardiology    Contact information    [address]  [address]  [place] [id number] [phone]           Discharge Medication Instructions:    Below are the medications your physician expects you to take upon discharge:    Review all your  home medications and newly ordered medications with your doctor and/or pharmacist. Follow medication instructions as directed by your doctor and/or pharmacist.    Please keep your medication list with you and share with your physician.               Medication List      CONTINUE taking these medications        Instructions    Morning Afternoon Evening Bedtime    ALEVE 220 MG tablet   Generic drug:  naproxen        Take 220 mg by mouth 3 times a week. Tues, Thurs, Sat   Dose:  220 mg                        ascorbic acid 500 MG Tabs   Commonly known as:  ascorbic acid        Take 500 mg by mouth every day.   Dose:  500 mg                        aspirin EC 81 MG Tbec   Last time this was given:  325 mg on 8/18/2017  9:04 AM   Commonly known as:  ECOTRIN        Take 81 mg by mouth every day.   Dose:  81 mg                        atorvastatin 20 MG Tabs   Last time this was given:  20 mg on 8/17/2017  7:50 PM   Commonly known as:  LIPITOR        TAKE 1 TABLET BY MOUTH EVERY DAY                        hydrochlorothiazide 12.5 MG tablet   Last time this was given:  12.5 mg on 8/18/2017  9:04 AM   Commonly known as:  HYDRODIURIL        Doctor's comments:  called to [employer]   Take 1 Tab by mouth every day.   Dose:  12.5 mg                        losartan 50 MG Tabs   Last time this was given:  50 mg on 8/18/2017  9:04 AM   Commonly known as:  COZAAR        Take 1 Tab by mouth every day.   Dose:  50 mg                        LUTEIN        Take 1 Tab by mouth every day.   Dose:  1 Tab                        potassium chloride 8 MEQ tablet   Commonly known as:  KLOR-CON        Take 8 mEq by mouth 3 times a week. Tues, Thurs, Sat   Dose:  8 mEq                        Vitamin B Complex Tabs        Take 1 Tab by mouth every day.   Dose:  1 Tab                        vitamin D 1000 UNIT Tabs   Commonly known as:  cholecalciferol        Take 1,000 Units by mouth 3 times a week. Tues, Thurs, Sat   Dose:  1000 Units                         ZADITOR 0.025 % ophthalmic solution   Generic drug:  ketotifen        Place 1 Drop in both eyes every day.   Dose:  1 Drop                                Instructions           Diet / Nutrition:    Follow any diet instructions given to you by your doctor or the dietician, including how much salt (sodium) you are allowed each day.    If you are overweight, talk to your doctor about a weight reduction plan.    Activity:    Remain physically active following your doctor's instructions about exercise and activity.    Rest often.     Any time you become even a little tired or short of breath, SIT DOWN and rest.    Worsening Symptoms:    Report any of the following signs and symptoms to the doctor's office immediately:    *Pain of jaw, arm, or neck  *Chest pain not relieved by medication                               *Dizziness or loss of consciousness  *Difficulty breathing even when at rest   *More tired than usual                                       *Bleeding drainage or swelling of surgical site  *Swelling of feet, ankles, legs or stomach                 *Fever (>100ºF)  *Pink or blood tinged sputum  *Weight gain (3lbs/day or 5lbs /week)           *Shock from internal defibrillator (if applicable)  *Palpitations or irregular heartbeats                *Cool and/or numb extremities    Stroke Awareness    Common Risk Factors for Stroke include:    Age  Atrial Fibrillation  Carotid Artery Stenosis  Diabetes Mellitus  Excessive alcohol consumption  High blood pressure  Overweight   Physical inactivity  Smoking    Warning signs and symptoms of a stroke include:    *Sudden numbness or weakness of the face, arm or leg (especially on one side of the body).  *Sudden confusion, trouble speaking or understanding.  *Sudden trouble seeing in one or both eyes.  *Sudden trouble walking, dizziness, loss of balance or coordination.Sudden severe headache with no known cause.    It is very important to get treatment quickly when a  stroke occurs. If you experience any of the above warning signs, call 911 immediately.                   Disclaimer         Quit Smoking / Tobacco Use:    I understand the use of any tobacco products increases my chance of suffering from future heart disease or stroke and could cause other illnesses which may shorten my life. Quitting the use of tobacco products is the single most important thing I can do to improve my health. For further information on smoking / tobacco cessation call a Toll Free Quit Line at 1-334.543.3979 (*National [employer] [place]) or 1-915.194.3462 (American Lung Association) or you can access the web based program at www.lungusa.org.    [place] Tobacco Users Help Line:  (581) 978-3086       Toll Free: 1-894.395.8386  Quit Tobacco Program [facility] (548)133-7816    Crisis Hotline:    [place] Crisis Hotline:  5-141-TSQAOGW or 1-427.347.1227    [place] Crisis Hotline:    1-797.537.3888 or 998-045-7832    Discharge Survey:   Thank you for choosing [facility]. We hope we did everything we could to make your hospital stay a pleasant one. You may be receiving a phone survey and we would appreciate your time and participation in answering the questions. Your input is very valuable to us in our efforts to improve our service to our patients and their families.        My signature on this form indicates that:    1. I have reviewed and understand the above information.  2. My questions regarding this information have been answered to my satisfaction.  3. I have formulated a plan with my discharge nurse to obtain my prescribed medications for home.                  Disclaimer         __________________________________                     __________       ________                       Patient Signature                                                 Date                    Time

## 2017-08-16 NOTE — PROGRESS NOTES
"1104  Report from ALFONZO Mejia.    1154  Assumed care of Pt at 1154 when [place], assessment complete.  Pt resting comfortably, A & O X 4, VSS, nsr: neuro assessment negative for any new deficits - on SX is ataxia - Pt stated \"I feel drunk, like I could pitch forward.\"  Pt given bedside dysphagia eval - passed with no problems such as choking, gagging, drooling; denies pain, discomfort at this time.  Pt updated on and understands POC; oriented to call light and unit routine; Pt given snack of juice, crackers, peanut butter after passing dysphagia eval; provided with warm blankets.  Lights down for Pt comfort; denies other needs at this time.  Bed in low position, call light within reach - will continue to monitor.   "

## 2017-08-16 NOTE — PROGRESS NOTES
Pt resting comfortably; up to RR with stand by assist.  Pt denies pain, discomfort, needs at this time.  Call light in lap - will continue to monitor.

## 2017-08-17 ENCOUNTER — APPOINTMENT (OUTPATIENT)
Dept: RADIOLOGY | Facility: MEDICAL CENTER | Age: 82
End: 2017-08-17
Attending: HOSPITALIST
Payer: MEDICARE

## 2017-08-17 ENCOUNTER — PATIENT OUTREACH (OUTPATIENT)
Dept: HEALTH INFORMATION MANAGEMENT | Facility: OTHER | Age: 82
End: 2017-08-17

## 2017-08-17 PROBLEM — I65.01 OCCLUSION OF RIGHT VERTEBRAL ARTERY: Status: ACTIVE | Noted: 2017-08-17

## 2017-08-17 LAB
ANION GAP SERPL CALC-SCNC: 7 MMOL/L (ref 0–11.9)
BASOPHILS # BLD AUTO: 0.7 % (ref 0–1.8)
BASOPHILS # BLD: 0.05 K/UL (ref 0–0.12)
BUN SERPL-MCNC: 37 MG/DL (ref 8–22)
CALCIUM SERPL-MCNC: 8.7 MG/DL (ref 8.5–10.5)
CHLORIDE SERPL-SCNC: 110 MMOL/L (ref 96–112)
CHOLEST SERPL-MCNC: 114 MG/DL (ref 100–199)
CO2 SERPL-SCNC: 20 MMOL/L (ref 20–33)
CREAT SERPL-MCNC: 1.69 MG/DL (ref 0.5–1.4)
EOSINOPHIL # BLD AUTO: 0.45 K/UL (ref 0–0.51)
EOSINOPHIL NFR BLD: 6.7 % (ref 0–6.9)
ERYTHROCYTE [DISTWIDTH] IN BLOOD BY AUTOMATED COUNT: 47 FL (ref 35.9–50)
GFR SERPL CREATININE-BSD FRML MDRD: 38 ML/MIN/1.73 M 2
GLUCOSE SERPL-MCNC: 96 MG/DL (ref 65–99)
HCT VFR BLD AUTO: 40.5 % (ref 42–52)
HDLC SERPL-MCNC: 48 MG/DL
HGB BLD-MCNC: 14 G/DL (ref 14–18)
IMM GRANULOCYTES # BLD AUTO: 0.02 K/UL (ref 0–0.11)
IMM GRANULOCYTES NFR BLD AUTO: 0.3 % (ref 0–0.9)
LDLC SERPL CALC-MCNC: 53 MG/DL
LV EJECT FRACT  99904: 70
LV EJECT FRACT MOD 2C 99903: 75.53
LV EJECT FRACT MOD 4C 99902: 67.35
LV EJECT FRACT MOD BP 99901: 68.86
LYMPHOCYTES # BLD AUTO: 2.06 K/UL (ref 1–4.8)
LYMPHOCYTES NFR BLD: 30.6 % (ref 22–41)
MCH RBC QN AUTO: 34.5 PG (ref 27–33)
MCHC RBC AUTO-ENTMCNC: 34.6 G/DL (ref 33.7–35.3)
MCV RBC AUTO: 99.8 FL (ref 81.4–97.8)
MONOCYTES # BLD AUTO: 0.53 K/UL (ref 0–0.85)
MONOCYTES NFR BLD AUTO: 7.9 % (ref 0–13.4)
NEUTROPHILS # BLD AUTO: 3.62 K/UL (ref 1.82–7.42)
NEUTROPHILS NFR BLD: 53.8 % (ref 44–72)
NRBC # BLD AUTO: 0 K/UL
NRBC BLD AUTO-RTO: 0 /100 WBC
PLATELET # BLD AUTO: 99 K/UL (ref 164–446)
PMV BLD AUTO: 12.2 FL (ref 9–12.9)
POTASSIUM SERPL-SCNC: 4.4 MMOL/L (ref 3.6–5.5)
RBC # BLD AUTO: 4.06 M/UL (ref 4.7–6.1)
SODIUM SERPL-SCNC: 137 MMOL/L (ref 135–145)
TRIGL SERPL-MCNC: 67 MG/DL (ref 0–149)
TSH SERPL DL<=0.005 MIU/L-ACNC: 3.21 UIU/ML (ref 0.3–3.7)
VIT B12 SERPL-MCNC: 395 PG/ML (ref 211–911)
WBC # BLD AUTO: 6.7 K/UL (ref 4.8–10.8)

## 2017-08-17 PROCEDURE — G8988 SELF CARE GOAL STATUS: HCPCS | Mod: CI

## 2017-08-17 PROCEDURE — 36415 COLL VENOUS BLD VENIPUNCTURE: CPT

## 2017-08-17 PROCEDURE — 97161 PT EVAL LOW COMPLEX 20 MIN: CPT

## 2017-08-17 PROCEDURE — G8987 SELF CARE CURRENT STATUS: HCPCS | Mod: CI

## 2017-08-17 PROCEDURE — A9270 NON-COVERED ITEM OR SERVICE: HCPCS | Performed by: NURSE PRACTITIONER

## 2017-08-17 PROCEDURE — 700102 HCHG RX REV CODE 250 W/ 637 OVERRIDE(OP): Performed by: NURSE PRACTITIONER

## 2017-08-17 PROCEDURE — G0378 HOSPITAL OBSERVATION PER HR: HCPCS

## 2017-08-17 PROCEDURE — 93306 TTE W/DOPPLER COMPLETE: CPT | Mod: 26 | Performed by: INTERNAL MEDICINE

## 2017-08-17 PROCEDURE — 99226 PR SUBSEQUENT OBSERVATION CARE,LEVEL III: CPT | Performed by: HOSPITALIST

## 2017-08-17 PROCEDURE — 700102 HCHG RX REV CODE 250 W/ 637 OVERRIDE(OP): Performed by: HOSPITALIST

## 2017-08-17 PROCEDURE — A9270 NON-COVERED ITEM OR SERVICE: HCPCS | Performed by: HOSPITALIST

## 2017-08-17 PROCEDURE — 70551 MRI BRAIN STEM W/O DYE: CPT

## 2017-08-17 PROCEDURE — 85025 COMPLETE CBC W/AUTO DIFF WBC: CPT

## 2017-08-17 PROCEDURE — G8978 MOBILITY CURRENT STATUS: HCPCS | Mod: CI

## 2017-08-17 PROCEDURE — G8979 MOBILITY GOAL STATUS: HCPCS | Mod: CI

## 2017-08-17 PROCEDURE — 80048 BASIC METABOLIC PNL TOTAL CA: CPT

## 2017-08-17 PROCEDURE — 93306 TTE W/DOPPLER COMPLETE: CPT

## 2017-08-17 PROCEDURE — 70547 MR ANGIOGRAPHY NECK W/O DYE: CPT

## 2017-08-17 PROCEDURE — 80061 LIPID PANEL: CPT

## 2017-08-17 PROCEDURE — 70544 MR ANGIOGRAPHY HEAD W/O DYE: CPT

## 2017-08-17 PROCEDURE — 84443 ASSAY THYROID STIM HORMONE: CPT

## 2017-08-17 PROCEDURE — 97165 OT EVAL LOW COMPLEX 30 MIN: CPT | Mod: XE

## 2017-08-17 PROCEDURE — G8980 MOBILITY D/C STATUS: HCPCS | Mod: CI

## 2017-08-17 PROCEDURE — 82607 VITAMIN B-12: CPT

## 2017-08-17 PROCEDURE — G8989 SELF CARE D/C STATUS: HCPCS | Mod: CI

## 2017-08-17 RX ORDER — HYDROCHLOROTHIAZIDE 12.5 MG/1
12.5 TABLET ORAL
Status: DISCONTINUED | OUTPATIENT
Start: 2017-08-17 | End: 2017-08-18 | Stop reason: HOSPADM

## 2017-08-17 RX ADMIN — HYDROCHLOROTHIAZIDE 12.5 MG: 12.5 TABLET ORAL at 12:04

## 2017-08-17 RX ADMIN — ATORVASTATIN CALCIUM 20 MG: 20 TABLET, FILM COATED ORAL at 19:50

## 2017-08-17 RX ADMIN — LOSARTAN POTASSIUM 50 MG: 50 TABLET, FILM COATED ORAL at 10:03

## 2017-08-17 RX ADMIN — ASPIRIN 325 MG: 325 TABLET, DELAYED RELEASE ORAL at 10:03

## 2017-08-17 ASSESSMENT — ENCOUNTER SYMPTOMS
NAUSEA: 0
PALPITATIONS: 0
EYES NEGATIVE: 1
DIARRHEA: 0
CONSTITUTIONAL NEGATIVE: 1
TINGLING: 0
NEUROLOGICAL NEGATIVE: 1
CHILLS: 0
PSYCHIATRIC NEGATIVE: 1
COUGH: 0
FEVER: 0
SHORTNESS OF BREATH: 0
DOUBLE VISION: 0
MYALGIAS: 0
WEAKNESS: 0
CONSTIPATION: 0
HEADACHES: 0
GASTROINTESTINAL NEGATIVE: 1
BLURRED VISION: 0
FOCAL WEAKNESS: 0
RESPIRATORY NEGATIVE: 1
MUSCULOSKELETAL NEGATIVE: 1
CARDIOVASCULAR NEGATIVE: 1
DIZZINESS: 0
VOMITING: 0

## 2017-08-17 ASSESSMENT — COGNITIVE AND FUNCTIONAL STATUS - GENERAL
DAILY ACTIVITIY SCORE: 24
MOBILITY SCORE: 23
SUGGESTED CMS G CODE MODIFIER MOBILITY: CI
SUGGESTED CMS G CODE MODIFIER DAILY ACTIVITY: CH
CLIMB 3 TO 5 STEPS WITH RAILING: A LITTLE

## 2017-08-17 ASSESSMENT — GAIT ASSESSMENTS
ASSISTIVE DEVICE: NONE;SINGLE POINT CANE;FRONT WHEEL WALKER
GAIT LEVEL OF ASSIST: STAND BY ASSIST
DISTANCE (FEET): 250

## 2017-08-17 ASSESSMENT — PAIN SCALES - GENERAL
PAINLEVEL_OUTOF10: 0
PAINLEVEL_OUTOF10: 0

## 2017-08-17 ASSESSMENT — ACTIVITIES OF DAILY LIVING (ADL): TOILETING: INDEPENDENT

## 2017-08-17 NOTE — PROGRESS NOTES
MRI called for screen sheet, completed and faxed. Cheryl CABRERA called, pt ok to be off tele for MRI.

## 2017-08-17 NOTE — PROGRESS NOTES
Hospital Medicine Interval Note  Date of Service:  8/17/2017    Chief Complaint  89 y.o.-year-old male admitted 8/16/2017 with c/o ataxia. He is a retired internist.     Interval Problem Update  8/17- MRA- r stenosis/occulsion vertbral artery- discussed with IR, Dr Mccoy and Dr Lugo- no intervention required, MRI Brain neg, echo unremarkable. Pt symptom improving, Dr Mccoy wants MRI Cervical spine to look for stenosis    Consultants/Specialty  Dr Mccoy- neurology  Dr Lugo- cardiology    Disposition  MRI cervical spine pending, cont monitoring, expect discharge tomorrow     Review of Systems   Constitutional: Negative.  Negative for fever, chills and malaise/fatigue.   HENT: Positive for hearing loss.    Eyes: Negative.  Negative for blurred vision and double vision.   Respiratory: Negative.  Negative for cough and shortness of breath.    Cardiovascular: Negative.  Negative for chest pain, palpitations and leg swelling.   Gastrointestinal: Negative.  Negative for nausea, vomiting, diarrhea and constipation.   Genitourinary: Negative.  Negative for dysuria.   Musculoskeletal: Negative.  Negative for myalgias and joint pain.   Skin: Negative.    Neurological: Negative.  Negative for dizziness, tingling, focal weakness, weakness and headaches.        Ataxia   Endo/Heme/Allergies: Negative.    Psychiatric/Behavioral: Negative.       Physical Exam Laboratory/Imaging   Filed Vitals:    08/16/17 2305 08/17/17 0600 08/17/17 0952 08/17/17 1200   BP: 155/72 162/65 182/81 190/81   Pulse: 65 64 58 66   Temp: 36.7 °C (98 °F) 36.9 °C (98.4 °F) 36.8 °C (98.3 °F) 36.8 °C (98.2 °F)   Resp: 16 14 16 16   Height:       Weight:       SpO2: 94% 95% 97% 97%     Physical Exam   Constitutional: He is oriented to person, place, and time. He appears well-developed and well-nourished.   HENT:   Head: Normocephalic and atraumatic.   Mouth/Throat: Oropharynx is clear and moist. No oropharyngeal exudate.   [place]   Eyes: Conjunctivae  are normal. Right eye exhibits no discharge. Left eye exhibits no discharge. No scleral icterus.   Neck: Normal range of motion. Neck supple. No tracheal deviation present.   Cardiovascular: Normal rate, regular rhythm, normal heart sounds and intact distal pulses.    Pulmonary/Chest: Effort normal and breath sounds normal.   Abdominal: Soft. Bowel sounds are normal. He exhibits no distension. There is no tenderness.   Musculoskeletal: Normal range of motion. He exhibits no edema or tenderness.   Neurological: He is alert and oriented to person, place, and time. No cranial nerve deficit.   Slightly ataxic gait   Skin: Skin is warm and dry. No rash noted. He is not diaphoretic. No erythema.   Psychiatric: He has a normal mood and affect. His behavior is normal. Judgment and thought content normal.    Lab Results   Component Value Date/Time    WBC 6.7 08/17/2017 01:50 AM    HEMOGLOBIN 14.0 08/17/2017 01:50 AM    HEMATOCRIT 40.5* 08/17/2017 01:50 AM    PLATELET COUNT 99* 08/17/2017 01:50 AM     Lab Results   Component Value Date/Time    SODIUM 137 08/17/2017 01:50 AM    POTASSIUM 4.4 08/17/2017 01:50 AM    CHLORIDE 110 08/17/2017 01:50 AM    CO2 20 08/17/2017 01:50 AM    GLUCOSE 96 08/17/2017 01:50 AM    BUN 37* 08/17/2017 01:50 AM    CREATININE 1.69* 08/17/2017 01:50 AM      Assessment/Plan    * Ataxia (present on admission)  Assessment & Plan  Concern for CNS etiology given his history and risk factors  Continue aspirin and atorvastatin losartan hctz  MRI of the brain- neg  MRA of the intra-and extracranial vessels- r vert artery stenosis/occulsion- no intervention required  Echocardiogram- unremarkable  PT evaluation- rec outpt PT or HH, use anne Mccoy checking MRI Cervical Spine    Benign hypertensive heart disease without heart failure (present on admission)  Assessment & Plan  Cont losartan, hctz  Explained neuro recs for allowed permissive htn and he still wants both of his bp meds given     Coronary  atherosclerosis of native coronary artery (present on admission)  Assessment & Plan  Continue current medical therapy  He was taken off beta blocker secondary to bradycardia    Hypercholesterolemia (present on admission)  Assessment & Plan  Continue Lipitor     PVD (peripheral vascular disease) (CMS-HCC) (present on admission)  Assessment & Plan  Continue aspirin and Lipitor       Labs reviewed, Medications reviewed, Radiology images reviewed and EKG reviewed  Ruelas catheter: No Ruelas      DVT Prophylaxis: Not indicated at this time, ambulatory (full dose ASA)  DVT prophylaxis - mechanical: Not indicated at this time, ambulatory  Ulcer prophylaxis: Not indicated    Assessed for rehab: Patient was assess for and/or received rehabilitation services during this hospitalization

## 2017-08-17 NOTE — PROGRESS NOTES
Urine collected, labeled, sent to lab; Pt resting comfortably with wife at bedside; denies pain, discomfort, needs.

## 2017-08-17 NOTE — THERAPY
"Occupational Therapy Evaluation completed.   Functional Status:  Pt seen for OT eval due to new shuffled gait and ataxia. Independent-SBA with ADLs at this time. SBA for ADL transfers. Refer to PT eval for gait assessment and level of assistance.   Plan of Care: Patient with no further skilled OT needs in the acute care setting at this time  Discharge Recommendations:  Equipment: No Equipment Needed. Post-acute therapy Currently anticipate no further skilled therapy needs once patient is discharged from the inpatient setting.    See \"Rehab Therapy-Acute\" Patient Summary Report for complete documentation.    "

## 2017-08-17 NOTE — PROGRESS NOTES
Assessed pt. A&O x4. Denies n/t, nausea, pain, and SOB. Resting in bed. POC discussed with pt. Hourly rounding in place.

## 2017-08-17 NOTE — PROGRESS NOTES
Assumed patient care.  Bedside report received from ALFONZO [place].    Pt off unit via wheelchair to MRI with transporter

## 2017-08-17 NOTE — ASSESSMENT & PLAN NOTE
Cont losartan, hctz  Explained neuro recs for allowed permissive htn and he still wants both of his bp meds given

## 2017-08-17 NOTE — THERAPY
"Physical Therapy Evaluation completed.   Bed Mobility:  Supine to Sit:  (NT)  Transfers: Sit to Stand: Stand by Assist  Gait: Level Of Assist: Stand by Assist with Front-Wheel Walker and Single Point Cane       Plan of Care: Patient with no further skilled PT needs in the acute care setting at this time  Discharge Recommendations: Equipment: Single Point Cane. Post-acute therapy Discharge to home with outpatient or home health for additional skilled therapy services.    See \"Rehab Therapy-Acute\" Patient Summary Report for complete documentation.   Pt presents with decreased dynamic standing balance, decreased gait speed, and increased unsteadiness with gait. Pt demonstrates slow and unsteady ambulation without AD but gait was improved with use of both a SPC and a FWW. Pt preferred use of SPC and information about purchasing a SPC upon discharge was provided. Pt asceneded/descended 2 flights of stairs with SBA and again was more steady with SPC use. Pt had increased unsteadiness descending stairs but is very self aware of limitations. Pt will benefit from additional skilled outpatient PT upon discharge in order to improve higher level balance activities.   "

## 2017-08-17 NOTE — PROGRESS NOTES
"Pt ambulated to RR with stand by assist; stated he \"did much better\" on his own - was feeling more his base line.  Pt concerned about order for permissive HTN; stated his losartan is necessary for his HTN and general health; also stated that his bladder \"just doesn't feel right\" and requested a UA - cup for sample placed in RR.  Chen Leyva paged for orders - Dr. Keanu Khoury no longer on call.  "

## 2017-08-17 NOTE — PROGRESS NOTES
Report received from Gege MEJÍA. Tele monitoring in place. Pt eating dinner. Wife at bedside. Bed in low position, call light within reach.

## 2017-08-17 NOTE — CONSULTS
"       [facility] consult note    Name Luis Antonio Turner     1928   Age/Sex 89 y.o. 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 Pt report that he was in his [facility] till he went to sleep in the night. He woke up in  [facility] and was trying to walk to the bathroom and realized that he is off balance, with diffculity in taking steps- short steps. Denies dizziness, vertigo, n/v, visual changes. He is not sure if he was with poor balance or dizziness while sitting. Since he has come to hospital, he is noticing improvement in his walk and balance in period of every 12 hrs. He says his walk is better than before but not close to baseline yet. Denies any muscle weakness or sensory loss     At baseline his walk is not very good, he mentions he takes short steps and has difficulty in taking long strides. His walk is gradually getting worse.     HIs previous TIAs was almost an year back, with R sided weakness upon waking up which resolved spontaneously in ~2 hrs. He has similar 3 episode. He has his MRI which showed decreased R vertebral artery  Flow. He was on statin, and losrtan with HCTZ for his BP. He takes ASA. Hx of cardiac stent,PVD.        ROS          Past Medical History:   Past Medical History   Diagnosis Date   • Benign hypertensive heart disease without heart failure    • Coronary atherosclerosis of native coronary artery    • Chronic kidney disease, stage III (moderate)    • Palpitations    • Peripheral vascular disease, unspecified (CMS-HCC)    • Malignant neoplasm of prostate (CMS-HCC)    • Stented coronary artery 2012   • Benign essential HTN 2012   • CAD (coronary artery disease) 2012   • " CKD (chronic kidney disease) stage 3, GFR 30-59 ml/min 6/1/2012   • Hypercholesterolemia 6/1/2012   • Palpitations 6/1/2012   • PVD (peripheral vascular disease) (CMS-HCC) 6/1/2012   • Prostate cancer (CMS-HCC) 6/1/2012       Past Surgical History:  Past Surgical History   Procedure Laterality Date   • Other cardiac surgery       angioplasty and stent of the proximal circumflex coronary artery for acute MI       Current Outpatient Medications:  Home Medications     Reviewed by Erna Bautista PhT (Pharmacy Tech) on 08/16/17 at 0948  Med List Status: Complete    Medication Last Dose Status    ascorbic acid (ASCORBIC ACID) 500 MG Tab 8/15/2017 Active    aspirin EC (ECOTRIN) 81 MG TBEC 8/15/2017 Active    atorvastatin (LIPITOR) 20 MG Tab 8/15/2017 Active    B Complex Vitamins (VITAMIN B COMPLEX) Tab 8/15/2017 Active    hydrochlorothiazide (HYDRODIURIL) 12.5 MG tablet 8/15/2017 Active    ketotifen (ZADITOR) 0.025 % ophthalmic solution 8/15/2017 Active    losartan (COZAAR) 50 MG Tab 8/15/2017 Active    LUTEIN 8/15/2017 Active    naproxen (ALEVE) 220 MG tablet 8/15/2017 Active    potassium chloride (KLOR-CON) 8 MEQ tablet 8/15/2017 Active    vitamin D (CHOLECALCIFEROL) 1000 UNIT Tab 8/15/2017 Active                Medication Allergy/Sensitivities:  No Known Allergies      Family History:  Family History   Problem Relation Age of Onset   • Heart Attack Father 60       Social History:  Social History     Social History   • Marital Status:      Spouse Name: N/A   • Number of Children: N/A   • Years of Education: N/A     Occupational History   • Not on file.     Social History Main Topics   • Smoking status: Never Smoker    • Smokeless tobacco: Never Used   • Alcohol Use: Yes   • Drug Use: No   • Sexual Activity: Not on file     Other Topics Concern   • Not on file     Social History Narrative    ** Merged History Encounter **          Living situation: lives with wife      Physical Exam     Filed Vitals:     "08/16/17 2305 08/17/17 0600 08/17/17 0952 08/17/17 1200   BP: 155/72 162/65 182/81 190/81   Pulse: 65 64 58 66   Temp: 36.7 °C (98 °F) 36.9 °C (98.4 °F) 36.8 °C (98.3 °F) 36.8 °C (98.2 °F)   Resp: 16 14 16 16   Height:       Weight:       SpO2: 94% 95% 97% 97%     Body mass index is 26.61 kg/(m^2).  /81 mmHg  Pulse 66  Temp(Src) 36.8 °C (98.2 °F)  Resp 16  Ht 1.727 m (5' 8\")  Wt 79.379 kg (175 lb)  BMI 26.61 kg/m2  SpO2 97%  O2 therapy: Pulse Oximetry: 97 %, O2 (LPM): 0, O2 Delivery: None (Room Air)    Physical Exam  General: pleasant and actively conversing  Chest: b/l CTAB  CVS: regular, S1S2 normal  Neuro NEUROLOGICAL EXAM:   Mental status, orientation: Awake, alert and fully oriented.   Speech and language: speech is clear and fluent. The patient is able to name, repeat and comprehend.   Memory: There is intact recollection of recent and remote events.   Cranial nerve exam: Pupils are 3-4 mm bilaterally and equally reactive to light and accommodation. Visual fields are intact by confrontation. There is no nystagmus on primary or secondary gaze. Intact full EOM in all directions of gaze. Face appears symmetric. Sensation in the face is intact to light touch. Uvula is midline. Palate elevates symmetrically. Tongue is midline.   Motor exam: Strength is 5/5 in all extremities. Tone is normal. No abnormal movements were seen on exam.   Sensory exam reveals normal sense of light touch, proprioception, vibration and pinprick in all extremities.   Deep tendon reflexes: 2+ throughout with absent ankle jerks. Plantar responses are flexor. There is no clonus.   Coordination: shows a normal finger-nose-finger. Normal rapidly alternating movements.   Gait: short and wide stepping gait   Rhomberg +        Data Review         Lab Data Review:  Recent Results (from the past 24 hour(s))   URINALYSIS,CULTURE IF INDICATED    Collection Time: 08/16/17  6:46 PM   Result Value Ref Range    Color Yellow     Character " Clear     Specific Gravity 1.015 <1.035    Ph 7.0 5.0-8.0    Glucose Negative Negative mg/dL    Ketones Negative Negative mg/dL    Protein Negative Negative mg/dL    Bilirubin Negative Negative    Urobilinogen, Urine 1.0 Negative    Nitrite Negative Negative    Leukocyte Esterase Negative Negative    Occult Blood Negative Negative    Micro Urine Req see below     Culture Indicated No UA Culture   Lipid Profile    Collection Time: 08/17/17  1:50 AM   Result Value Ref Range    Cholesterol,Tot 114 100 - 199 mg/dL    Triglycerides 67 0 - 149 mg/dL    HDL 48 >=40 mg/dL    LDL 53 <100 mg/dL   CBC with Differential    Collection Time: 08/17/17  1:50 AM   Result Value Ref Range    WBC 6.7 4.8 - 10.8 K/uL    RBC 4.06 (L) 4.70 - 6.10 M/uL    Hemoglobin 14.0 14.0 - 18.0 g/dL    Hematocrit 40.5 (L) 42.0 - 52.0 %    MCV 99.8 (H) 81.4 - 97.8 fL    MCH 34.5 (H) 27.0 - 33.0 pg    MCHC 34.6 33.7 - 35.3 g/dL    RDW 47.0 35.9 - 50.0 fL    Platelet Count 99 (L) 164 - 446 K/uL    MPV 12.2 9.0 - 12.9 fL    Neutrophils-Polys 53.80 44.00 - 72.00 %    Lymphocytes 30.60 22.00 - 41.00 %    Monocytes 7.90 0.00 - 13.40 %    Eosinophils 6.70 0.00 - 6.90 %    Basophils 0.70 0.00 - 1.80 %    Immature Granulocytes 0.30 0.00 - 0.90 %    Nucleated RBC 0.00 /100 WBC    Neutrophils (Absolute) 3.62 1.82 - 7.42 K/uL    Lymphs (Absolute) 2.06 1.00 - 4.80 K/uL    Monos (Absolute) 0.53 0.00 - 0.85 K/uL    Eos (Absolute) 0.45 0.00 - 0.51 K/uL    Baso (Absolute) 0.05 0.00 - 0.12 K/uL    Immature Granulocytes (abs) 0.02 0.00 - 0.11 K/uL    NRBC (Absolute) 0.00 K/uL   Basic Metabolic Panel (BMP)    Collection Time: 08/17/17  1:50 AM   Result Value Ref Range    Sodium 137 135 - 145 mmol/L    Potassium 4.4 3.6 - 5.5 mmol/L    Chloride 110 96 - 112 mmol/L    Co2 20 20 - 33 mmol/L    Glucose 96 65 - 99 mg/dL    Bun 37 (H) 8 - 22 mg/dL    Creatinine 1.69 (H) 0.50 - 1.40 mg/dL    Calcium 8.7 8.5 - 10.5 mg/dL    Anion Gap 7.0 0.0 - 11.9   ESTIMATED GFR    Collection  Time: 08/17/17  1:50 AM   Result Value Ref Range    GFR If  46 (A) >60 mL/min/1.73 m 2    GFR If Non  38 (A) >60 mL/min/1.73 m 2   TSH    Collection Time: 08/17/17  1:50 AM   Result Value Ref Range    TSH 3.210 0.300 - 3.700 uIU/mL   VITAMIN B12    Collection Time: 08/17/17  1:50 AM   Result Value Ref Range    Vitamin B12 -True Cobalamin 395 211 - 911 pg/mL   Echocardiogram Comp W/O cont    Collection Time: 08/17/17 10:38 AM   Result Value Ref Range    Eject.Frac. MOD BP 68.86     Eject.Frac. MOD 4C 67.35     Eject.Frac. MOD 2C 75.53     Left Ventrical Ejection Fraction 70        Imaging/Procedures Review:    Echocardiogram Comp W/O cont   Final Result      MR-MRA HEAD-W/O   Final Result         1.  Focal high-grade stenosis or occlusion in the distal intracranial RIGHT vertebral artery with slow flow in a small caliber RIGHT vertebral artery   2.  RIGHT PCA arises from the RIGHT ICA      MR-BRAIN-W/O   Final Result      1.  Occlusion of or slow flow within the visualized distal RIGHT vertebral artery   2.  MRI of the brain without contrast otherwise within normal limits for age with atrophy and white matter changes.   3.  Chronic-appearing RIGHT sphenoid sinus disease      MR-MRA NECK-W/O   Final Result      1.  Focal high-grade stenosis or occlusion in the distal intracranial RIGHT vertebral artery with slow flow in a small caliber RIGHT vertebral artery   2.  Patent LEFT vertebral artery   3.  No evidence of hemodynamically significant stenosis in either internal carotid artery      DX-CHEST-PORTABLE (1 VIEW)   Final Result      Scattered linear atelectasis.      CT-HEAD W/O   Final Result      1.  Diffuse atrophy and white matter changes.   2.  No acute intracranial hemorrhage or territorial infarct.   3.  New complete opacification of RIGHT sphenoid sinus, likely chronic.   4.  Mild chronic inferior frontal and ethmoid sinus disease.      MR-CERVICAL SPINE-W/O    (Results  Pending)        EKG:   Sinus aundrea    ECHO- WNL with EF 70%           Assessment/Plan     # ataxia  - pt came with acute onset ataxia which is resolving, with hx R  Vertrebral artery slow flow. MRI doesn't show acute infarct or hemorrhage. Pt has extensive hx of atherosclerotic discease. maintaining perfusion with BP and HR would be beneficial  The ataxia is most likely from the large vessel occlusive disease- need to evaluated cervical spine for reversible causes  - MRI cervical spine for the stenosis  - Obtain B12 and thiamine supplement  - maintain the BP in higher range as it can cause transient hypoperfusion. BP goal 140-160/90  - physical therapy and occupational therapy for the walk  - continue ASA, Statin            Quality Measures  Core Measures   As per primary

## 2017-08-18 ENCOUNTER — APPOINTMENT (OUTPATIENT)
Dept: RADIOLOGY | Facility: MEDICAL CENTER | Age: 82
End: 2017-08-18
Attending: NURSE PRACTITIONER
Payer: MEDICARE

## 2017-08-18 ENCOUNTER — PATIENT OUTREACH (OUTPATIENT)
Dept: HEALTH INFORMATION MANAGEMENT | Facility: OTHER | Age: 82
End: 2017-08-18

## 2017-08-18 VITALS
HEIGHT: 68 IN | HEART RATE: 72 BPM | RESPIRATION RATE: 16 BRPM | BODY MASS INDEX: 26.52 KG/M2 | SYSTOLIC BLOOD PRESSURE: 172 MMHG | TEMPERATURE: 97.7 F | OXYGEN SATURATION: 94 % | WEIGHT: 175 LBS | DIASTOLIC BLOOD PRESSURE: 74 MMHG

## 2017-08-18 PROCEDURE — A9270 NON-COVERED ITEM OR SERVICE: HCPCS | Performed by: HOSPITALIST

## 2017-08-18 PROCEDURE — 700102 HCHG RX REV CODE 250 W/ 637 OVERRIDE(OP): Performed by: HOSPITALIST

## 2017-08-18 PROCEDURE — G0378 HOSPITAL OBSERVATION PER HR: HCPCS

## 2017-08-18 PROCEDURE — 99217 PR OBSERVATION CARE DISCHARGE: CPT | Performed by: HOSPITALIST

## 2017-08-18 PROCEDURE — A9270 NON-COVERED ITEM OR SERVICE: HCPCS | Performed by: NURSE PRACTITIONER

## 2017-08-18 PROCEDURE — 700102 HCHG RX REV CODE 250 W/ 637 OVERRIDE(OP): Performed by: NURSE PRACTITIONER

## 2017-08-18 RX ADMIN — LOSARTAN POTASSIUM 50 MG: 50 TABLET, FILM COATED ORAL at 09:04

## 2017-08-18 RX ADMIN — HYDROCHLOROTHIAZIDE 12.5 MG: 12.5 TABLET ORAL at 09:04

## 2017-08-18 RX ADMIN — ASPIRIN 325 MG: 325 TABLET, DELAYED RELEASE ORAL at 09:04

## 2017-08-18 ASSESSMENT — PAIN SCALES - GENERAL: PAINLEVEL_OUTOF10: 0

## 2017-08-18 NOTE — PROGRESS NOTES
Seen this morning.  No new sx, no ataxia.  PERRL, full EOM's are full.  No drift or dysmetria.  Motor and DTR are =.    Ok with me to d/c and get cervical MRI as an outpatient, f/u with me.

## 2017-08-18 NOTE — PROGRESS NOTES
Pt's wife at bedside.  Pt states personal belongings are in possession.  Pt escorted off unit by tech without incident.

## 2017-08-18 NOTE — PROGRESS NOTES
IV dc'd.  Discharge instructions given to patient; patient verbalizes understanding, all questions answered.  Copy of DC summary provided, signed copy in chart.  Pt awaiting ride

## 2017-08-18 NOTE — DISCHARGE INSTRUCTIONS
Discharge Instructions    Discharged to home by car with relative. Discharged via wheelchair, hospital escort: Yes.  Special equipment needed: Not Applicable    Be sure to schedule a follow-up appointment with your primary care doctor or any specialists as instructed.     Discharge Plan:   Influenza Vaccine Indication: Patient Refuses    I understand that a diet low in cholesterol, fat, and sodium is recommended for good health. Unless I have been given specific instructions below for another diet, I accept this instruction as my diet prescription.   Other diet: Heart healthy     Special Instructions: None    · Is patient discharged on Warfarin / Coumadin?   No     · Is patient Post Blood Transfusion?  No    Depression / Suicide Risk    As you are discharged from this [facility] facility, it is important to learn how to keep safe from harming yourself.    Recognize the warning signs:  · Abrupt changes in personality, positive or negative- including increase in energy   · Giving away possessions  · Change in eating patterns- significant weight changes-  positive or negative  · Change in sleeping patterns- unable to sleep or sleeping all the time   · Unwillingness or inability to communicate  · Depression  · Unusual sadness, discouragement and loneliness  · Talk of wanting to die  · Neglect of personal appearance   · Rebelliousness- reckless behavior  · Withdrawal from people/activities they love  · Confusion- inability to concentrate     If you or a loved one observes any of these behaviors or has concerns about self-harm, here's what you can do:  · Talk about it- your feelings and reasons for harming yourself  · Remove any means that you might use to hurt yourself (examples: pills, rope, extension cords, firearm)  · Get professional help from the community (Mental Health, Substance Abuse, psychological counseling)  · Do not be alone:Call your Safe Contact- someone whom you trust who will be there for  you.  · Call your local CRISIS HOTLINE 260-5123 or 260-472-0290  · Call your local Children's Mobile Crisis Response Team [place] (643) 518-5237 or www.yetu  · Call the toll free National Suicide Prevention Hotlines   · National Suicide Prevention Lifeline 286-192-CTKX (3984)  · National [employer] Network 800-SUICIDE (264-2194)

## 2017-08-18 NOTE — PROGRESS NOTES
Pt aao x 4. Mario. Denies pain upon assessment.  Up sba, Poc discussed with pt. MRI will be tomorrow morning. Call light within reach. Instructed pt to use call light for assistance. Hourly rounding in use

## 2017-08-18 NOTE — DISCHARGE SUMMARY
CHIEF COMPLAINT ON ADMISSION  Chief Complaint   Patient presents with   • Gait Problem       CODE STATUS  DNR    HPI & HOSPITAL COURSE  This is a 89 y.o. male here with new onset ataxia noted this morning when he woke up. He he is a retired general medicine physician and reports that he had a TIA about 1 year ago with right-sided weakness, he was seen by a neurologist and his workup revealed that he had slow flow in his right vertebral artery. This morning when he woke up he noticed that his gait was ataxic, he did not have any focal weakness, no changes in vision or speech, no headache, no vertigo. Given his history got concerned about this representing stroke and decided to present to the emergency room. He has been maintained on aspirin and atorvastatin. He denies any falls or injuries. No loss of consciousness. He has a known history of carotid artery disease and follows up with Dr. Lugo. He also has peripheral vascular disease with buttock claudication. She also has a history of chronic kidney disease with nonfunctioning left kidney following surgery for prostate cancer.  Dr Lugo and Dr Mccoy were consulted with findings of occlusion of that right vertebral artery. Medical management was recommended and no surgical intervention required. MRI Brain and echo were unremarkable. Dr Mccoy did want a MRI of his cervical spine however due to time constraints agreed to order as an outpatient. PT and OT evaluated and found his ataxia resolved on 8/18. He was ambulating well in the halls with his cane.     Therefore, he is discharged in good and stable condition with close outpatient follow-up.    SPECIFIC OUTPATIENT FOLLOW-UP  Dr Nadine Lugo    DISCHARGE PROBLEM LIST  Principal Problem:    Ataxia POA: Yes  Active Problems:    Benign hypertensive heart disease without heart failure POA: Yes    Coronary atherosclerosis of native coronary artery (Chronic) POA: Yes      Overview: Stent, CFX, 1999,  moderate LAD disease.     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 *      FOLLOW UP  Future Appointments  Date Time Provider Department Center   8/22/2017 2:20 PM ANTOINETTE Millard [facility] None   9/5/2017 11:00 AM Luis Antonio Lugo M.D. [facility] None     Pcp Pt States None    Schedule an appointment as soon as possible for a visit      Luis Antonio Lugo M.D.  [address]  [address]  [place] [address]  [phone]    Go on 8/5/2017        MEDICATIONS ON DISCHARGE   Luis nAtonio Turner MD   Home Medication Instructions WILLY:[id number]    Printed on:08/18/17 1403   Medication Information                      ascorbic acid (ASCORBIC ACID) 500 MG Tab  Take 500 mg by mouth every day.             aspirin EC (ECOTRIN) 81 MG TBEC  Take 81 mg by mouth every day.             atorvastatin (LIPITOR) 20 MG Tab  TAKE 1 TABLET BY MOUTH EVERY DAY             B Complex Vitamins (VITAMIN B COMPLEX) Tab  Take 1 Tab by mouth every day.             hydrochlorothiazide (HYDRODIURIL) 12.5 MG tablet  Take 1 Tab by mouth every day.             ketotifen (ZADITOR) 0.025 % ophthalmic solution  Place 1 Drop in both eyes every day.             losartan (COZAAR) 50 MG Tab  Take 1 Tab by mouth every day.             LUTEIN  Take 1 Tab by mouth every day.             naproxen (ALEVE) 220 MG tablet  Take 220 mg by mouth 3 times a week. Tues, Thurs, Sat             potassium chloride (KLOR-CON) 8 MEQ tablet  Take 8 mEq by mouth 3 times a week. Tues, Thurs, Sat             vitamin D (CHOLECALCIFEROL) 1000 UNIT Tab  Take 1,000 Units by mouth 3  times a week. Tues, Thurs, Sat                 DIET  Orders Placed This Encounter   Procedures   • DIET ORDER     Standing Status: Standing      Number of Occurrences: 1      Standing Expiration Date:      Order Specific Question:  Diet:     Answer:  2 Gram Sodium [7]     Order Specific Question:  Macronutrient modifications:     Answer:  Low Cholesterol [7]       ACTIVITY  As tolerated.  Weight bearing as tolerated      CONSULTATIONS  Dr Mccoy    PROCEDURES  None      LABORATORY  Lab Results   Component Value Date/Time    SODIUM 137 08/17/2017 01:50 AM    POTASSIUM 4.4 08/17/2017 01:50 AM    CHLORIDE 110 08/17/2017 01:50 AM    CO2 20 08/17/2017 01:50 AM    GLUCOSE 96 08/17/2017 01:50 AM    BUN 37* 08/17/2017 01:50 AM    CREATININE 1.69* 08/17/2017 01:50 AM        Lab Results   Component Value Date/Time    WBC 6.7 08/17/2017 01:50 AM    HEMOGLOBIN 14.0 08/17/2017 01:50 AM    HEMATOCRIT 40.5* 08/17/2017 01:50 AM    PLATELET COUNT 99* 08/17/2017 01:50 AM        Total time of the discharge process exceeds 36 minutes

## 2017-08-18 NOTE — PROGRESS NOTES
Apologized to patient for MRI wait time, pt understanding.  Pt requesting MRI to be performed outpatient.  NP informed

## 2017-08-18 NOTE — PROGRESS NOTES
"Assumed patient care.  Bedside report received from ALFONZO Castillo at 0730.  Assessment completed.  Pt A&Ox4, states ataxia is \"pretty much gone\".  Pt eager to go MRI.  Called MRI, pt to be seen this AM, pt informed.   Pt denies any needs at this time.  Bed in locked, lowest position.  Call light and personal belongings within reach.  Will continue to monitor.    "

## 2017-08-18 NOTE — PROGRESS NOTES
Pt requesting to take midnight vitals at this time because pt doesn't want to be bothered when pt sleeps

## 2017-08-19 ENCOUNTER — PATIENT OUTREACH (OUTPATIENT)
Dept: HEALTH INFORMATION MANAGEMENT | Facility: OTHER | Age: 82
End: 2017-08-19

## 2017-08-27 LAB — EKG IMPRESSION: NORMAL

## 2017-08-31 DIAGNOSIS — I11.9 BENIGN HYPERTENSIVE HEART DISEASE WITHOUT HEART FAILURE: ICD-10-CM

## 2017-08-31 DIAGNOSIS — E78.5 HYPERLIPIDEMIA, UNSPECIFIED HYPERLIPIDEMIA TYPE: ICD-10-CM

## 2017-08-31 RX ORDER — ATORVASTATIN CALCIUM 20 MG/1
20 TABLET, FILM COATED ORAL
Qty: 90 TAB | Refills: 3 | OUTPATIENT
Start: 2017-08-31 | End: 2018-09-25 | Stop reason: SDUPTHER

## 2017-09-05 ENCOUNTER — OFFICE VISIT (OUTPATIENT)
Dept: CARDIOLOGY | Facility: MEDICAL CENTER | Age: 82
End: 2017-09-05
Payer: MEDICARE

## 2017-09-05 VITALS
SYSTOLIC BLOOD PRESSURE: 140 MMHG | OXYGEN SATURATION: 93 % | HEIGHT: 67 IN | WEIGHT: 176 LBS | DIASTOLIC BLOOD PRESSURE: 54 MMHG | BODY MASS INDEX: 27.62 KG/M2 | HEART RATE: 64 BPM

## 2017-09-05 DIAGNOSIS — I11.9 BENIGN HYPERTENSIVE HEART DISEASE WITHOUT HEART FAILURE: ICD-10-CM

## 2017-09-05 DIAGNOSIS — C61 PROSTATE CANCER (HCC): ICD-10-CM

## 2017-09-05 DIAGNOSIS — I25.118 ATHEROSCLEROSIS OF NATIVE CORONARY ARTERY OF NATIVE HEART WITH STABLE ANGINA PECTORIS (HCC): ICD-10-CM

## 2017-09-05 DIAGNOSIS — E78.00 HYPERCHOLESTEROLEMIA: Chronic | ICD-10-CM

## 2017-09-05 DIAGNOSIS — Z95.5 PRESENCE OF BARE METAL STENT IN LEFT CIRCUMFLEX CORONARY ARTERY: ICD-10-CM

## 2017-09-05 DIAGNOSIS — I73.9 PVD (PERIPHERAL VASCULAR DISEASE) (HCC): ICD-10-CM

## 2017-09-05 PROCEDURE — 99213 OFFICE O/P EST LOW 20 MIN: CPT | Performed by: INTERNAL MEDICINE

## 2017-09-05 ASSESSMENT — ENCOUNTER SYMPTOMS
MYALGIAS: 0
COUGH: 0
BRUISES/BLEEDS EASILY: 0
PND: 0
ORTHOPNEA: 0
WHEEZING: 0
FALLS: 0

## 2017-09-05 NOTE — LETTER
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 He had an episode of angina shortly after eating and then exerting himself. That's the only one he has had but it is unusual.    Past Medical History:   Diagnosis Date   • Stented coronary artery 6/1/2012   • Benign essential HTN 6/1/2012   • CAD (coronary artery disease) 6/1/2012   • CKD (chronic kidney disease) stage 3, GFR 30-59 ml/min 6/1/2012   • Hypercholesterolemia 6/1/2012   • Palpitations 6/1/2012   • PVD (peripheral vascular disease) (CMS-HCC) 6/1/2012   • Prostate cancer (CMS-HCC) 6/1/2012   • Benign hypertensive heart disease without heart failure    • Chronic kidney disease, stage III (moderate)    • Coronary atherosclerosis of native coronary artery    • Malignant neoplasm of prostate (CMS-HCC)    • Palpitations    • Peripheral vascular disease, unspecified (CMS-HCC)      Past Surgical History:   Procedure Laterality Date   • OTHER CARDIAC SURGERY      angioplasty and stent of the proximal circumflex coronary artery for acute MI     Family History   Problem Relation Age of Onset   • Heart Attack Father 60     History   Smoking Status   • Never Smoker   Smokeless Tobacco   • Never Used     No Known Allergies  Outpatient Encounter Prescriptions as of 9/5/2017   Medication Sig Dispense Refill   • atorvastatin (LIPITOR) 20 MG Tab Take 1 Tab by mouth every day. 90 Tab 3   • vitamin D (CHOLECALCIFEROL) 1000 UNIT Tab Take 1,000 Units by mouth 3 times a week. Tues, Thurs, Sat     • naproxen (ALEVE) 220 MG tablet Take 220 mg by mouth 3 times a week. Sonia Batres, Sat     • B Complex Vitamins (VITAMIN B  "COMPLEX) Tab Take 1 Tab by mouth every day.     • potassium chloride (KLOR-CON) 8 MEQ tablet Take 8 mEq by mouth 3 times a week. Tues, Thurs, Sat     • ascorbic acid (ASCORBIC ACID) 500 MG Tab Take 500 mg by mouth every day.     • ketotifen (ZADITOR) 0.025 % ophthalmic solution Place 1 Drop in both eyes every day.     • losartan (COZAAR) 50 MG Tab Take 1 Tab by mouth every day. 90 Tab 1   • hydrochlorothiazide (HYDRODIURIL) 12.5 MG tablet Take 1 Tab by mouth every day. 90 Tab 3   • LUTEIN Take 1 Tab by mouth every day.     • aspirin EC (ECOTRIN) 81 MG TBEC Take 81 mg by mouth every day.       No facility-administered encounter medications on file as of 9/5/2017.      Review of Systems   HENT: Negative for nosebleeds.    Respiratory: Negative for cough and wheezing.    Cardiovascular: Negative for orthopnea and PND.   Musculoskeletal: Negative for falls and myalgias.   Neurological:        He was hospitalized with another paroxysm of ataxia and workup of that is in progress now. It occurs with extension of his neck. Dr. Mccoy is evaluating this.   Endo/Heme/Allergies: Does not bruise/bleed easily.        Objective:   /54   Pulse 64   Ht 1.702 m (5' 7\")   Wt 79.8 kg (176 lb)   SpO2 93%   BMI 27.57 kg/m²      Physical Exam   Constitutional: He is oriented to person, place, and time. He appears well-developed and well-nourished.   Eyes: Conjunctivae are normal. No scleral icterus.   Neck: No JVD present.   Cardiovascular: Normal rate, regular rhythm, S1 normal and S2 normal.  Exam reveals no gallop.    Murmur (2/6 systolic ejection) heard.  Musculoskeletal: He exhibits no edema.   Neurological: He is alert and oriented to person, place, and time.   Ataxia has almost completely resolved   Skin: Skin is warm and dry.   Psychiatric: He has a normal mood and affect. His behavior is normal. Judgment and thought content normal.       Assessment:     1. Atherosclerosis of native coronary artery of native heart with " stable angina pectoris (CMS-HCC)  NM-CARDIAC STRESS TEST   2. Presence of bare metal stent in left circumflex coronary artery, 4mm Duet, 4/9/99  NM-CARDIAC STRESS TEST   3. Benign hypertensive heart disease without heart failure     4. Hypercholesterolemia       Angina is so infrequent for him that the possibility of myocardial ischemia outside the infarct distribution needs to be reassessed. Echo last month was very satisfactory although he was hypertensive at the time.  Medical Decision Making:  Today's Assessment / Status / Plan:   Obtain a treadmill stress myocardial perfusion scan converted to pharmacological stress if he does not make rate.  No change in regimen. Follow-up based on the above results.      No Recipients

## 2017-09-06 NOTE — PROGRESS NOTES
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 He had an episode of angina shortly after eating and then exerting himself. That's the only one he has had but it is unusual.    Past Medical History:   Diagnosis Date   • Stented coronary artery 6/1/2012   • Benign essential HTN 6/1/2012   • CAD (coronary artery disease) 6/1/2012   • CKD (chronic kidney disease) stage 3, GFR 30-59 ml/min 6/1/2012   • Hypercholesterolemia 6/1/2012   • Palpitations 6/1/2012   • PVD (peripheral vascular disease) (CMS-HCC) 6/1/2012   • Prostate cancer (CMS-HCC) 6/1/2012   • Benign hypertensive heart disease without heart failure    • Chronic kidney disease, stage III (moderate)    • Coronary atherosclerosis of native coronary artery    • Malignant neoplasm of prostate (CMS-HCC)    • Palpitations    • Peripheral vascular disease, unspecified (CMS-HCC)      Past Surgical History:   Procedure Laterality Date   • OTHER CARDIAC SURGERY      angioplasty and stent of the proximal circumflex coronary artery for acute MI     Family History   Problem Relation Age of Onset   • Heart Attack Father 60     History   Smoking Status   • Never Smoker   Smokeless Tobacco   • Never Used     No Known Allergies  Outpatient Encounter Prescriptions as of 9/5/2017   Medication Sig Dispense Refill   • atorvastatin (LIPITOR) 20 MG Tab Take 1 Tab by mouth every day. 90 Tab 3   • vitamin D (CHOLECALCIFEROL) 1000 UNIT Tab Take 1,000 Units by mouth 3 times a week. Sonia Batres, Sat     • naproxen (ALEVE) 220 MG tablet Take 220 mg by mouth 3 times a week. Sonia Batres, Sat     • B Complex Vitamins (VITAMIN B COMPLEX) Tab Take 1 Tab by mouth every day.     • potassium chloride (KLOR-CON) 8 MEQ tablet Take 8 mEq by mouth 3 times a week. Sonia Batres, Sat     • ascorbic acid (ASCORBIC ACID) 500 MG Tab Take 500 mg by mouth every day.     • ketotifen (ZADITOR) 0.025 % ophthalmic solution Place 1 Drop in both eyes  "every day.     • losartan (COZAAR) 50 MG Tab Take 1 Tab by mouth every day. 90 Tab 1   • hydrochlorothiazide (HYDRODIURIL) 12.5 MG tablet Take 1 Tab by mouth every day. 90 Tab 3   • LUTEIN Take 1 Tab by mouth every day.     • aspirin EC (ECOTRIN) 81 MG TBEC Take 81 mg by mouth every day.       No facility-administered encounter medications on file as of 9/5/2017.      Review of Systems   HENT: Negative for nosebleeds.    Respiratory: Negative for cough and wheezing.    Cardiovascular: Negative for orthopnea and PND.   Musculoskeletal: Negative for falls and myalgias.   Neurological:        He was hospitalized with another paroxysm of ataxia and workup of that is in progress now. It occurs with extension of his neck. Dr. Mccoy is evaluating this.   Endo/Heme/Allergies: Does not bruise/bleed easily.        Objective:   /54   Pulse 64   Ht 1.702 m (5' 7\")   Wt 79.8 kg (176 lb)   SpO2 93%   BMI 27.57 kg/m²     Physical Exam   Constitutional: He is oriented to person, place, and time. He appears well-developed and well-nourished.   Eyes: Conjunctivae are normal. No scleral icterus.   Neck: No JVD present.   Cardiovascular: Normal rate, regular rhythm, S1 normal and S2 normal.  Exam reveals no gallop.    Murmur (2/6 systolic ejection) heard.  Musculoskeletal: He exhibits no edema.   Neurological: He is alert and oriented to person, place, and time.   Ataxia has almost completely resolved   Skin: Skin is warm and dry.   Psychiatric: He has a normal mood and affect. His behavior is normal. Judgment and thought content normal.       Assessment:     1. Atherosclerosis of native coronary artery of native heart with stable angina pectoris (CMS-[facility])  NM-CARDIAC STRESS TEST   2. Presence of bare metal stent in left circumflex coronary artery, 4mm Duet, 4/9/99  NM-CARDIAC STRESS TEST   3. Benign hypertensive heart disease without heart failure     4. Hypercholesterolemia       Angina is so infrequent for him that the " possibility of myocardial ischemia outside the infarct distribution needs to be reassessed. Echo last month was very satisfactory although he was hypertensive at the time.  Medical Decision Making:  Today's Assessment / Status / Plan:   Obtain a treadmill stress myocardial perfusion scan converted to pharmacological stress if he does not make rate.  No change in regimen. Follow-up based on the above results.

## 2017-09-12 ENCOUNTER — HOSPITAL ENCOUNTER (OUTPATIENT)
Dept: RADIOLOGY | Facility: MEDICAL CENTER | Age: 82
End: 2017-09-12
Attending: INTERNAL MEDICINE
Payer: MEDICARE

## 2017-09-12 DIAGNOSIS — I25.118 ATHEROSCLEROSIS OF NATIVE CORONARY ARTERY OF NATIVE HEART WITH STABLE ANGINA PECTORIS (HCC): ICD-10-CM

## 2017-09-12 DIAGNOSIS — Z95.5 PRESENCE OF BARE METAL STENT IN LEFT CIRCUMFLEX CORONARY ARTERY: ICD-10-CM

## 2017-09-12 PROCEDURE — A9502 TC99M TETROFOSMIN: HCPCS

## 2017-09-13 ENCOUNTER — TELEPHONE (OUTPATIENT)
Dept: CARDIOLOGY | Facility: MEDICAL CENTER | Age: 82
End: 2017-09-13

## 2017-09-13 NOTE — TELEPHONE ENCOUNTER
Please let Dr. Turner know the results. No ischemia just the old infarct and excellent ejection fraction. 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

## 2017-09-13 NOTE — TELEPHONE ENCOUNTER
NM test read by Dr. Pantoja:    NUCLEAR IMAGING INTERPRETATION   Fixed inferolateral perfusion defect at rest and with stress consistent with    infarction (SSS 6, SDS 0).   Inferolateral hypokinesis. LV ejection fraction = 60%.     ECG INTERPRETATION   Negative stress ECG for ischemia.    To Dr. Lugo to advise.

## 2017-12-20 DIAGNOSIS — I10 ESSENTIAL HYPERTENSION: ICD-10-CM

## 2017-12-20 RX ORDER — LOSARTAN POTASSIUM 50 MG/1
50 TABLET ORAL
Qty: 90 TAB | Refills: 1 | Status: CANCELLED | OUTPATIENT
Start: 2017-12-20

## 2017-12-20 RX ORDER — LOSARTAN POTASSIUM 50 MG/1
50 TABLET ORAL
Qty: 90 TAB | Refills: 1 | OUTPATIENT
Start: 2017-12-20 | End: 2018-06-11 | Stop reason: SDUPTHER

## 2018-01-03 DIAGNOSIS — I10 ESSENTIAL HYPERTENSION: ICD-10-CM

## 2018-01-03 RX ORDER — POTASSIUM CHLORIDE 600 MG/1
8 TABLET, FILM COATED, EXTENDED RELEASE ORAL DAILY
Qty: 90 TAB | Refills: 0 | Status: SHIPPED | OUTPATIENT
Start: 2018-01-03 | End: 2018-04-09

## 2018-04-09 ENCOUNTER — OFFICE VISIT (OUTPATIENT)
Dept: CARDIOLOGY | Facility: MEDICAL CENTER | Age: 83
End: 2018-04-09
Payer: MEDICARE

## 2018-04-09 VITALS
HEIGHT: 67 IN | WEIGHT: 183 LBS | SYSTOLIC BLOOD PRESSURE: 144 MMHG | OXYGEN SATURATION: 92 % | DIASTOLIC BLOOD PRESSURE: 58 MMHG | BODY MASS INDEX: 28.72 KG/M2 | HEART RATE: 76 BPM

## 2018-04-09 DIAGNOSIS — I73.9 PVD (PERIPHERAL VASCULAR DISEASE) (HCC): ICD-10-CM

## 2018-04-09 DIAGNOSIS — N18.30 CKD (CHRONIC KIDNEY DISEASE) STAGE 3, GFR 30-59 ML/MIN (HCC): ICD-10-CM

## 2018-04-09 DIAGNOSIS — R27.0 ATAXIA: ICD-10-CM

## 2018-04-09 DIAGNOSIS — I10 ESSENTIAL HYPERTENSION, BENIGN: ICD-10-CM

## 2018-04-09 DIAGNOSIS — I65.01 OCCLUSION OF RIGHT VERTEBRAL ARTERY: ICD-10-CM

## 2018-04-09 DIAGNOSIS — I25.118 ATHEROSCLEROSIS OF NATIVE CORONARY ARTERY OF NATIVE HEART WITH STABLE ANGINA PECTORIS (HCC): ICD-10-CM

## 2018-04-09 LAB — EKG IMPRESSION: NORMAL

## 2018-04-09 PROCEDURE — 99214 OFFICE O/P EST MOD 30 MIN: CPT | Performed by: INTERNAL MEDICINE

## 2018-04-09 PROCEDURE — 93000 ELECTROCARDIOGRAM COMPLETE: CPT | Performed by: INTERNAL MEDICINE

## 2018-04-09 RX ORDER — HYDROCHLOROTHIAZIDE 25 MG/1
25 TABLET ORAL DAILY
Qty: 90 TAB | Refills: 3 | Status: SHIPPED | OUTPATIENT
Start: 2018-04-09 | End: 2019-04-11 | Stop reason: SDUPTHER

## 2018-04-09 RX ORDER — POTASSIUM CHLORIDE 600 MG/1
8 TABLET, FILM COATED, EXTENDED RELEASE ORAL DAILY
Qty: 90 TAB | Refills: 3 | Status: SHIPPED | OUTPATIENT
Start: 2018-04-09 | End: 2019-08-22

## 2018-04-09 NOTE — PATIENT INSTRUCTIONS
Please increase hydrochlorothiazide to 25mg daily.    Please start taking potassium 8meq daily.    Please get nonfasting lab tests in 1-2 weeks.

## 2018-04-09 NOTE — PROGRESS NOTES
Cardiology Follow-up Consultation Note    Date of note:    4/9/2018    Primary Care Provider: Pcp Pt States None  Referring Provider: Luis Antonio Lugo M.D.     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    Last seen by Dr. Lugo on 9/5/2017.    Interim Events:  Stress test negative in September.  Was ordered for angina no recurrence.    In terms of his hypertension, his SBP at home is 140s-170s.      Does have some worsening DONOVAN on [place].      In terms of CKD, last creatinine slightly higher has stopped NSAIDS.    ROS  Constitutional: Negative for chills, fever, weakness, night sweats, weight gain and weight loss.   Eyes: Negative for double vision, vision loss in left eye and vision loss in right eye.   Cardiovascular: see HPI.   Respiratory: Negative for cough, shortness of breath, and wheezing.    Musculoskeletal: Negative for joint swelling, muscle cramps, muscle weakness and myalgias.   Gastrointestinal: Negative for abdominal pain, hematochezia, hemorrhoids and melena.   Genitourinary: Negative for hematuria.   Neurological: Negative for dizziness, focal weakness, light-headedness, numbness, paresthesias. + ataxia      All other systems reviewed and discussed using a comprehensive questionnaire and are negative.       Past Medical History:   Diagnosis Date   • Benign essential HTN    • CAD (coronary artery disease) 04/09/1999    LCx STEMI   • Chronic kidney disease, stage III (moderate)    • CKD (chronic kidney disease) stage 3, GFR 30-59 ml/min    • Hypercholesterolemia    • Presence of bare metal stent in left circumflex coronary artery 04/09/1999    4 mm Duet   • Prostate  cancer (CMS-HCC)    • PVD (peripheral vascular disease) (CMS-HCC)     Bilateral common iliac stenosis, left worse than right         Past Surgical History:   Procedure Laterality Date   • OTHER CARDIAC SURGERY      angioplasty and stent of the proximal circumflex coronary artery for acute MI         Current Outpatient Prescriptions   Medication Sig Dispense Refill   • losartan (COZAAR) 50 MG Tab Take 1 Tab by mouth every day. 90 Tab 1   • hydrochlorothiazide (HYDRODIURIL) 12.5 MG tablet TAKE 1 TABLET BY MOUTH EVERY DAY 90 Tab 3   • atorvastatin (LIPITOR) 20 MG Tab Take 1 Tab by mouth every day. 90 Tab 3   • vitamin D (CHOLECALCIFEROL) 1000 UNIT Tab Take 1,000 Units by mouth 3 times a week. Sonia Batres, Sat     • B Complex Vitamins (VITAMIN B COMPLEX) Tab Take 1 Tab by mouth every day.     • ascorbic acid (ASCORBIC ACID) 500 MG Tab Take 500 mg by mouth every day.     • LUTEIN Take 1 Tab by mouth every day.     • aspirin EC (ECOTRIN) 81 MG TBEC Take 81 mg by mouth every day.     • potassium chloride (KLOR-CON) 8 MEQ tablet Take 1 Tab by mouth every day. For further refills please contact new cardiologist. Thank you 90 Tab 0   • naproxen (ALEVE) 220 MG tablet Take 220 mg by mouth 3 times a week. Sonia Batres, Sat     • ketotifen (ZADITOR) 0.025 % ophthalmic solution Place 1 Drop in both eyes every day.       No current facility-administered medications for this visit.          No Known Allergies      Family History   Problem Relation Age of Onset   • Heart Attack Father 60         Social History     Social History   • Marital status:      Spouse name: N/A   • Number of children: N/A   • Years of education: N/A     Occupational History   • Not on file.     Social History Main Topics   • Smoking status: Never Smoker   • Smokeless tobacco: Never Used   • Alcohol use 1.8 - 2.4 oz/week     3 - 4 Standard drinks or equivalent per week   • Drug use: No   • Sexual activity: Not on file     Other Topics Concern   • Not on  "file     Social History Narrative    ** Merged History Encounter **              Physical Exam:  Ambulatory Vitals  Blood pressure 144/58, pulse 76, height 1.702 m (5' 7\"), weight 83 kg (183 lb), SpO2 92 %.   Oxygen Therapy:  Pulse Oximetry: 92 %  BP Readings from Last 4 Encounters:   04/09/18 144/58   09/05/17 140/54   08/18/17 (!) 172/74   05/30/17 125/60       Weight/BMI: Body mass index is 28.66 kg/m².  Wt Readings from Last 4 Encounters:   04/09/18 83 kg (183 lb)   09/05/17 79.8 kg (176 lb)   08/16/17 79.4 kg (175 lb)   11/17/16 57.6 kg (127 lb)       General: Well appearing and in no apparent distress  Eyes: nl conjunctiva  ENT: OP clear, normal external appearance of nose and ears  Neck: JVP <8 cm H2O, no carotid bruits  Lungs: normal respiratory effort, CTAB  Heart: RRR, no murmurs, no rubs or gallops, no edema bilateral lower extremities. No LV/RV heave on cardiac palpatation. 2+ bilateral radial pulses.    Abdomen: soft, non tender, non distended, no masses, normal bowel sounds.  No HSM.  Extremities/MSK: no clubbing, no cyanosis  Neurological: No focal sensory deficits  Psychiatric: Appropriate affect, A/O x 3, intact judgement and insight  Skin: Warm extremities    Lab Data Review:  Lab Results   Component Value Date/Time    CHOLSTRLTOT 114 08/17/2017 01:50 AM    LDL 53 08/17/2017 01:50 AM    HDL 48 08/17/2017 01:50 AM    TRIGLYCERIDE 67 08/17/2017 01:50 AM       Lab Results   Component Value Date/Time    SODIUM 137 08/17/2017 01:50 AM    POTASSIUM 4.4 08/17/2017 01:50 AM    CHLORIDE 110 08/17/2017 01:50 AM    CO2 20 08/17/2017 01:50 AM    GLUCOSE 96 08/17/2017 01:50 AM    BUN 37 (H) 08/17/2017 01:50 AM    CREATININE 1.69 (H) 08/17/2017 01:50 AM    BUNCREATRAT 20 07/07/2016 08:09 AM     Lab Results   Component Value Date/Time    ALKPHOSPHAT 53 08/16/2017 07:41 AM    ASTSGOT 18 08/16/2017 07:41 AM    ALTSGPT 11 08/16/2017 07:41 AM    TBILIRUBIN 0.9 08/16/2017 07:41 AM      Lab Results   Component Value " Date/Time    WBC 6.7 08/17/2017 01:50 AM     No components found for: HBGA1C  No components found for: TROPONIN  No components found for: BNP    [facility] labs (2/2018)  Creatinine 1.9  BUN 37  Potassium 4.4  Sodium 142  LFTs WNL  eGFR 30    Baseline creatinine 1.7        Cardiac Imaging and Procedures Review:    EKG dated 4/9/2018 : 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 The RBBB is new from previous EKG.     Echo dated 8/2017:   CONCLUSIONS  Severe hypertension.  Left ventricular ejection fraction is visually estimated to be 70%.  Normal left atrial size.  No significant valve disease or flow abnormalities.   Right ventricular systolic pressure is estimated to be 30 mmHg.  Compared to the images of the prior study done 2015 -  there has been   no significant change.       Nuclear Perfusion Imaging (9/2017):   Myocardial Perfusion   Report   NUCLEAR IMAGING INTERPRETATION   Fixed inferolateral perfusion defect at rest and with stress consistent with    infarction (SSS 6, SDS 0).   Inferolateral hypokinesis. LV ejection fraction = 60%.   ECG INTERPRETATION   Negative stress ECG for ischemia.    STRESS TEST      Exercise   Protoc   Calderon          Duration       05:01    METS:   7.0   ol:                     (m:s):      [facility] (1999): I have the images he brought for review.  He had a STEMI in the proximal Lcx.     Radiology test Review:  MRI 8/2017:  1.  Focal high-grade stenosis or occlusion in the distal intracranial RIGHT vertebral artery with slow flow in a small caliber RIGHT vertebral artery  2.  Patent LEFT vertebral artery  3.  No evidence of hemodynamically significant stenosis in either internal carotid artery    1.  Focal high-grade stenosis or occlusion in the distal intracranial RIGHT vertebral artery with slow flow in a small caliber RIGHT vertebral artery  2.  RIGHT PCA arises from the RIGHT ICA    1.  Occlusion of or slow flow within the visualized distal RIGHT vertebral artery  2.  MRI of the  brain without contrast otherwise within normal limits for age with atrophy and white matter changes.  3.  Chronic-appearing RIGHT sphenoid sinus disease      Medical Decision Makin. Essential hypertension, benign  poorly controlled. 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  I believe his DONOVAN is likely secondary to hypertension, worsening diastolic function causing pulmonary edema, so will uptitrate diuretic slightly.   -increase hctz to 25mg PO Daily.  Restart Kcl 8mEq daily.   -lab check in 1 week  -continue losartan    2. Atherosclerosis of native coronary artery of native heart with stable angina pectoris (CMS-HCC)  Asymptomatic.  Small scar on last stress perfusion imaging, no ischemia  -continue aspirin/lipitor    3. CKD (chronic kidney disease) stage 3, GFR 30-59 ml/min  Avoid NSAIDS. Has one functioning kidney  -f/u repeat BMP    4. PVD (peripheral vascular disease) (CMS-HCC)  Limits exercise to around 100 years. Bilateral iliac disease, no previous intervention  -continue aspirin/lipitor    5. Ataxia  ? Vertebrobasilar insuffiency, known occlusion of right veterbral artery  -f/u with Dr. Lew, continue aspirin, lipitor, will not aggressively lower Bp.           Return in about 6 months (around 10/9/2018).      Shaun Crawley MD  [facility]  [place] [facility], [facility]  [address] [address]  Phone: [phone]  Fax: [phone]

## 2018-06-11 DIAGNOSIS — I10 ESSENTIAL HYPERTENSION: ICD-10-CM

## 2018-06-11 RX ORDER — LOSARTAN POTASSIUM 50 MG/1
50 TABLET ORAL
Qty: 90 TAB | Refills: 3 | Status: SHIPPED | OUTPATIENT
Start: 2018-06-11 | End: 2019-06-17 | Stop reason: SDUPTHER

## 2018-09-25 DIAGNOSIS — I11.9 BENIGN HYPERTENSIVE HEART DISEASE WITHOUT HEART FAILURE: ICD-10-CM

## 2018-09-25 DIAGNOSIS — E78.5 HYPERLIPIDEMIA, UNSPECIFIED HYPERLIPIDEMIA TYPE: ICD-10-CM

## 2018-09-26 RX ORDER — ATORVASTATIN CALCIUM 20 MG/1
20 TABLET, FILM COATED ORAL
Qty: 90 TAB | Refills: 3 | Status: SHIPPED | OUTPATIENT
Start: 2018-09-26 | End: 2019-10-11 | Stop reason: SDUPTHER

## 2018-10-09 ENCOUNTER — TELEPHONE (OUTPATIENT)
Dept: CARDIOLOGY | Facility: MEDICAL CENTER | Age: 83
End: 2018-10-09

## 2018-10-09 NOTE — TELEPHONE ENCOUNTER
Tried  to call patient about labs ordered in April by THOMAS. Phone kept ringing no VM set up. Will ask for labs at visit.

## 2018-12-20 ENCOUNTER — OFFICE VISIT (OUTPATIENT)
Dept: CARDIOLOGY | Facility: MEDICAL CENTER | Age: 83
End: 2018-12-20
Payer: MEDICARE

## 2018-12-20 VITALS
BODY MASS INDEX: 27.78 KG/M2 | HEIGHT: 67 IN | DIASTOLIC BLOOD PRESSURE: 62 MMHG | WEIGHT: 177 LBS | OXYGEN SATURATION: 94 % | SYSTOLIC BLOOD PRESSURE: 154 MMHG | HEART RATE: 68 BPM

## 2018-12-20 DIAGNOSIS — R27.0 ATAXIA: ICD-10-CM

## 2018-12-20 DIAGNOSIS — I73.9 PVD (PERIPHERAL VASCULAR DISEASE) (HCC): ICD-10-CM

## 2018-12-20 DIAGNOSIS — E78.00 HYPERCHOLESTEROLEMIA: ICD-10-CM

## 2018-12-20 DIAGNOSIS — I65.01 OCCLUSION OF RIGHT VERTEBRAL ARTERY: ICD-10-CM

## 2018-12-20 DIAGNOSIS — N18.30 CKD (CHRONIC KIDNEY DISEASE) STAGE 3, GFR 30-59 ML/MIN (HCC): ICD-10-CM

## 2018-12-20 DIAGNOSIS — I25.118 ATHEROSCLEROSIS OF NATIVE CORONARY ARTERY OF NATIVE HEART WITH STABLE ANGINA PECTORIS (HCC): ICD-10-CM

## 2018-12-20 PROCEDURE — 99213 OFFICE O/P EST LOW 20 MIN: CPT | Performed by: INTERNAL MEDICINE

## 2018-12-20 NOTE — PROGRESS NOTES
Cardiology Follow-up Consultation Note    Date of note:    12/20/2018  Primary Care Provider: Pcp Pt States None  Referring Provider: Luis Antonio Lugo M.D.     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    At our visit, 4/9/2018:  Stress test negative in September.  Was ordered for angina no recurrence.    In terms of his hypertension, his SBP at home is 140s-170s.      Does have some worsening DONOVAN [place].      In terms of CKD, last creatinine slightly higher has stopped NSAIDS.      Interim Events:  Had an episode of ataxia in September for 1 week.     In terms of hypertension, poorly controlled in the 140s-150s.     In terms of CKD, stable per report. [facility]     In terms of CAD, no angina.     ROS  Constitution: Negative for chills, fever and night sweats.   HENT: Negative for nosebleeds.    Eyes: Negative for vision loss in left eye and vision loss in right eye.   Respiratory: Negative for hemoptysis.    Gastrointestinal: Negative for hematemesis, hematochezia and melena.   Genitourinary: Negative for hematuria.   Neurological: Negative for focal weakness, numbness and paresthesias.   . + ataxia        Past Medical History:   Diagnosis Date   • Benign essential HTN    • CAD (coronary artery disease) 04/09/1999    LCx STEMI   • Chronic kidney disease, stage III (moderate) ([facility])    • CKD (chronic kidney disease) stage 3, GFR 30-59 ml/min ([facility])    • Hypercholesterolemia    • Presence of bare metal stent in left circumflex coronary artery 04/09/1999    4 mm Duet   • Prostate cancer ([facility])    • PVD (peripheral vascular disease) ([facility])     Bilateral common iliac stenosis, left worse than right         Past Surgical History:   Procedure Laterality Date   • OTHER CARDIAC SURGERY      angioplasty and stent of the proximal circumflex coronary artery for acute MI         Current Outpatient Prescriptions   Medication Sig Dispense Refill   • atorvastatin (LIPITOR) 20 MG Tab Take 1 Tab by mouth every day. 90 Tab 3   • losartan (COZAAR) 50 MG Tab Take 1 Tab by mouth every day. 90 Tab 3   • hydroCHLOROthiazide (HYDRODIURIL) 25 MG Tab Take 1 Tab by mouth every day. 90 Tab 3   • potassium chloride (KLOR-CON) 8 MEQ tablet Take 1 Tab by mouth every day. For further refills please contact new cardiologist. Thank you 90 Tab 3   • vitamin D (CHOLECALCIFEROL) 1000 UNIT Tab Take 1,000 Units by mouth 3 times a week. Sonia Batres, Sat     • B Complex Vitamins (VITAMIN B COMPLEX) Tab Take 1 Tab by mouth every day.     • ascorbic acid (ASCORBIC ACID) 500 MG Tab Take 500 mg by mouth every day.     • LUTEIN Take 1 Tab by mouth every day.     • aspirin EC (ECOTRIN) 81 MG TBEC Take 81 mg by mouth every day.     • ketotifen (ZADITOR) 0.025 % ophthalmic solution Place 1 Drop in both eyes every day.       No current facility-administered medications for this visit.          No Known Allergies      Family History   Problem Relation Age of Onset   • Heart Attack Father 60         Social History     Social History   • Marital status:      Spouse name: N/A   • Number of children: N/A   • Years of education: N/A     Occupational History   • Not on file.     Social History Main Topics   • Smoking status: Never Smoker   • Smokeless tobacco: Never Used   • Alcohol use 1.8 - 2.4 oz/week     3 - 4 Standard drinks or equivalent per week   • Drug use: No   • Sexual activity: Not on file     Other Topics Concern   • Not on file     Social History Narrative    ** Merged History Encounter **              Physical Exam:  Ambulatory Vitals  Blood pressure 154/62, pulse 68, height 1.702 m (5'  "7\"), weight 80.3 kg (177 lb), SpO2 94 %.   Oxygen Therapy:  Pulse Oximetry: 94 %  BP Readings from Last 4 Encounters:   12/20/18 154/62   04/09/18 144/58   09/05/17 140/54   08/18/17 (!) 172/74       Weight/BMI: Body mass index is 27.72 kg/m².  Wt Readings from Last 4 Encounters:   12/20/18 80.3 kg (177 lb)   04/09/18 83 kg (183 lb)   09/05/17 79.8 kg (176 lb)   08/16/17 79.4 kg (175 lb)       General: No apparent distress  Eyes: nl conjunctiva  ENT: OP clear, normal external appearance of nose and ears  Neck: JVP 4-5 cm H2O, no carotid bruits  Lungs: normal respiratory effort, CTAB  Heart: RRR, no murmurs, no rubs or gallops, 1+ edema bilateral lower extremities. No LV/RV heave on cardiac palpatation. 2+ bilateral radial pulses.  Unable to palpate bilateral dp pulses, this is chronic.   Abdomen: soft, non tender, non distended, no masses, normal bowel sounds.  No HSM.  Extremities/MSK: no clubbing, no cyanosis  Neurological: No focal sensory deficits  Psychiatric: Appropriate affect, A/O x 3, intact judgement and insight  Skin: Warm extremities    Exam repeated in full and unchanged except for as noted above.    Lab Data Review:  Lab Results   Component Value Date/Time    CHOLSTRLTOT 114 08/17/2017 01:50 AM    LDL 53 08/17/2017 01:50 AM    HDL 48 08/17/2017 01:50 AM    TRIGLYCERIDE 67 08/17/2017 01:50 AM       Lab Results   Component Value Date/Time    SODIUM 137 08/17/2017 01:50 AM    POTASSIUM 4.4 08/17/2017 01:50 AM    CHLORIDE 110 08/17/2017 01:50 AM    CO2 20 08/17/2017 01:50 AM    GLUCOSE 96 08/17/2017 01:50 AM    BUN 37 (H) 08/17/2017 01:50 AM    CREATININE 1.69 (H) 08/17/2017 01:50 AM    BUNCREATRAT 20 07/07/2016 08:09 AM     Lab Results   Component Value Date/Time    ALKPHOSPHAT 53 08/16/2017 07:41 AM    ASTSGOT 18 08/16/2017 07:41 AM    ALTSGPT 11 08/16/2017 07:41 AM    TBILIRUBIN 0.9 08/16/2017 07:41 AM      Lab Results   Component Value Date/Time    WBC 6.7 08/17/2017 01:50 AM     No components found " for: HBGA1C  No components found for: TROPONIN  No components found for: BNP    [facility] labs (2/2018)  Creatinine 1.9  BUN 37  Potassium 4.4  Sodium 142  LFTs WNL  eGFR 30    Baseline creatinine 1.7        Cardiac Imaging and Procedures Review:    EKG dated 4/9/2018 : 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 The RBBB is new from previous EKG.     Echo dated 8/2017:   CONCLUSIONS  Severe hypertension.  Left ventricular ejection fraction is visually estimated to be 70%.  Normal left atrial size.  No significant valve disease or flow abnormalities.   Right ventricular systolic pressure is estimated to be 30 mmHg.  Compared to the images of the prior study done 2015 -  there has been   no significant change.       Nuclear Perfusion Imaging (9/2017):   Myocardial Perfusion   Report   NUCLEAR IMAGING INTERPRETATION   Fixed inferolateral perfusion defect at rest and with stress consistent with    infarction (SSS 6, SDS 0).   Inferolateral hypokinesis. LV ejection fraction = 60%.   ECG INTERPRETATION   Negative stress ECG for ischemia.    STRESS TEST      Exercise   Protoc   Calderon          Duration       05:01    METS:   7.0   ol:                     (m:s):      [facility] (1999): I have the images he brought for review.  He had a STEMI in the proximal Lcx.     Radiology test Review:  MRI 8/2017:  1.  Focal high-grade stenosis or occlusion in the distal intracranial RIGHT vertebral artery with slow flow in a small caliber RIGHT vertebral artery  2.  Patent LEFT vertebral artery  3.  No evidence of hemodynamically significant stenosis in either internal carotid artery    1.  Focal high-grade stenosis or occlusion in the distal intracranial RIGHT vertebral artery with slow flow in a small caliber RIGHT vertebral artery  2.  RIGHT PCA arises from the RIGHT ICA    1.  Occlusion of or slow flow within the visualized distal RIGHT vertebral artery  2.  MRI of the brain without contrast otherwise within normal limits for age  with atrophy and white matter changes.  3.  Chronic-appearing RIGHT sphenoid sinus disease      Medical Decision Makin. Essential hypertension, benign  poorly controlled. 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  I believe his previous DONOVAN is likely secondary to hypertension, worsening diastolic function causing pulmonary edema, and this does seem to have improved on higher hctz dose.  -cont hctz to 25mg PO Daily.  Cont Kcl 8mEq daily.   -request OSH labs.   -continue losartan  -patient adamant about not starting further BP medications at this time.     2. Atherosclerosis of native coronary artery of native heart with stable angina pectoris (CMS-HCC)  Asymptomatic.  Small scar on last stress perfusion imaging, no ischemia  -continue aspirin/lipitor, f/u OSH labs lipid profile.     3. CKD (chronic kidney disease) stage 3, GFR 30-59 ml/min  Avoid NSAIDS. Has one functioning kidney  -followed by nephrology.     4. PVD (peripheral vascular disease) (CMS-HCC)  Limits exercise to around 100 years. Bilateral iliac disease, no previous intervention  -continue aspirin/lipitor    5. Ataxia  ? Vertebrobasilar insuffiency, known occlusion of right veterbral artery  -f/u with Dr. Lew, continue aspirin, lipitor, will not aggressively lower Bp.           Return in about 6 months (around 2019).      Shaun Crawley MD  [facility]  [facility], [address]  [address] [address]  Phone: [phone]  Fax: [phone]

## 2019-08-02 ENCOUNTER — TELEPHONE (OUTPATIENT)
Dept: CARDIOLOGY | Facility: MEDICAL CENTER | Age: 84
End: 2019-08-02

## 2019-08-02 DIAGNOSIS — Z79.01 CHRONIC ANTICOAGULATION: ICD-10-CM

## 2019-08-02 NOTE — TELEPHONE ENCOUNTER
THMOAS/[email or web address]    Pt, Dr Luis Antonio Turner, calling to discuss ordering lab orders in preparation for 8/22 appt with THOMAS.      Pt is asking you to call him before you fax the lab slip (which will go to [facility] on [address] in [place]).  Pt may have additional tests for you to order that are beyond the routine ones.     Pt is available prior to 11:45 today or after 3pm today, [phone].

## 2019-08-02 NOTE — TELEPHONE ENCOUNTER
"Spoke with pt via phone regarding his labs. Informed pt that we will order his routine CMP and Lipid panel and he is requesting a CBC as he states \"bruising issues\". Confirmed with pt his upcoming appointment with Dr. Crawley on 8/22 @ 3:20pm. Pt verbalized understanding.    Lab slips ordered and faxed to [facility] in [place] per pt request.    "

## 2019-08-17 LAB
ALBUMIN SERPL-MCNC: 4.1 G/DL (ref 3.2–4.6)
ALBUMIN/GLOB SERPL: 1.8 {RATIO} (ref 1.2–2.2)
ALP SERPL-CCNC: 71 IU/L (ref 39–117)
ALT SERPL-CCNC: 16 IU/L (ref 0–44)
AST SERPL-CCNC: 22 IU/L (ref 0–40)
BILIRUB SERPL-MCNC: 0.5 MG/DL (ref 0–1.2)
BUN SERPL-MCNC: 41 MG/DL (ref 10–36)
BUN/CREAT SERPL: 21 (ref 10–24)
CALCIUM SERPL-MCNC: 8.9 MG/DL (ref 8.6–10.2)
CHLORIDE SERPL-SCNC: 107 MMOL/L (ref 96–106)
CHOLEST SERPL-MCNC: 134 MG/DL (ref 100–199)
CO2 SERPL-SCNC: 21 MMOL/L (ref 20–29)
CREAT SERPL-MCNC: 1.92 MG/DL (ref 0.76–1.27)
ERYTHROCYTE [DISTWIDTH] IN BLOOD BY AUTOMATED COUNT: 13.3 % (ref 12.3–15.4)
GLOBULIN SER CALC-MCNC: 2.3 G/DL (ref 1.5–4.5)
GLUCOSE SERPL-MCNC: 95 MG/DL (ref 65–99)
HCT VFR BLD AUTO: 42.4 % (ref 37.5–51)
HDLC SERPL-MCNC: 50 MG/DL
HGB BLD-MCNC: 14 G/DL (ref 13–17.7)
LABORATORY COMMENT REPORT: NORMAL
LDLC SERPL CALC-MCNC: 64 MG/DL (ref 0–99)
MCH RBC QN AUTO: 33 PG (ref 26.6–33)
MCHC RBC AUTO-ENTMCNC: 33 G/DL (ref 31.5–35.7)
MCV RBC AUTO: 100 FL (ref 79–97)
NRBC BLD AUTO-RTO: ABNORMAL %
POTASSIUM SERPL-SCNC: 4.5 MMOL/L (ref 3.5–5.2)
PROT SERPL-MCNC: 6.4 G/DL (ref 6–8.5)
RBC # BLD AUTO: 4.24 X10E6/UL (ref 4.14–5.8)
SODIUM SERPL-SCNC: 143 MMOL/L (ref 134–144)
TRIGL SERPL-MCNC: 102 MG/DL (ref 0–149)
VLDLC SERPL CALC-MCNC: 20 MG/DL (ref 5–40)
WBC # BLD AUTO: 6.2 X10E3/UL (ref 3.4–10.8)

## 2019-08-22 ENCOUNTER — OFFICE VISIT (OUTPATIENT)
Dept: CARDIOLOGY | Facility: MEDICAL CENTER | Age: 84
End: 2019-08-22
Payer: MEDICARE

## 2019-08-22 VITALS
WEIGHT: 171 LBS | HEART RATE: 72 BPM | SYSTOLIC BLOOD PRESSURE: 150 MMHG | DIASTOLIC BLOOD PRESSURE: 66 MMHG | OXYGEN SATURATION: 93 % | BODY MASS INDEX: 26.84 KG/M2 | HEIGHT: 67 IN

## 2019-08-22 DIAGNOSIS — I65.01 OCCLUSION OF RIGHT VERTEBRAL ARTERY: ICD-10-CM

## 2019-08-22 DIAGNOSIS — R00.2 PALPITATIONS: ICD-10-CM

## 2019-08-22 DIAGNOSIS — E78.00 HYPERCHOLESTEROLEMIA: ICD-10-CM

## 2019-08-22 DIAGNOSIS — N18.30 CKD (CHRONIC KIDNEY DISEASE) STAGE 3, GFR 30-59 ML/MIN (HCC): ICD-10-CM

## 2019-08-22 DIAGNOSIS — D69.6 THROMBOCYTOPENIA (HCC): ICD-10-CM

## 2019-08-22 DIAGNOSIS — I25.118 ATHEROSCLEROSIS OF NATIVE CORONARY ARTERY OF NATIVE HEART WITH STABLE ANGINA PECTORIS (HCC): ICD-10-CM

## 2019-08-22 DIAGNOSIS — I73.9 PVD (PERIPHERAL VASCULAR DISEASE) (HCC): ICD-10-CM

## 2019-08-22 PROCEDURE — 99213 OFFICE O/P EST LOW 20 MIN: CPT | Performed by: INTERNAL MEDICINE

## 2019-08-22 ASSESSMENT — ENCOUNTER SYMPTOMS
LOSS OF BALANCE: 1
COUGH: 1

## 2019-08-22 NOTE — PROGRESS NOTES
Cardiology Follow-up Consultation Note    Date of note:    8/22/2019  Primary Care Provider: Pcp Pt States None  Referring Provider: Luis Antonio Lugo M.D.     Patient Name: Luis Antonio Turner   YOB: 1928  MRN:              [id number]    Chief Complaint: chest pain    History of Present Illness: Luis Antonio Yueaddis MD Johnny is a 89 y.o. 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    At our visit, 4/9/2018:  Stress test negative in September.  Was ordered for angina no recurrence.    In terms of his hypertension, his SBP at home is 140s-170s.      Does have some worsening [place].      In terms of CKD, last creatinine slightly higher has stopped NSAIDS.    At our visit, 12/20/2018:  Had an episode of ataxia in September for 1 week.     In terms of hypertension, poorly controlled in the 140s-150s.       Interim Events:  In terms of hypertension, BP ranges in the 120s-150s. Sometimes skipping losartan and hctz as does not feel well in the 120s. Not taking potassium, but electrolytes well controlled with dates.     In terms of CKD, stable.     In terms of CAD, no angina.         Review of Systems   Constitution: Positive for malaise/fatigue.   HENT: Positive for hearing loss.    Respiratory: Positive for cough.    Neurological: Positive for loss of balance.     All other systems reviewed and discussed using a comprehensive questionnaire and are negative.     Ataxia unchanged.         Past Medical History:   Diagnosis Date   • Benign essential HTN    • CAD (coronary artery disease) 04/09/1999    LCx STEMI   • Chronic kidney disease, stage III (moderate) ([facility])    • CKD (chronic kidney disease) stage 3, GFR 30-59 ml/min ([facility])    • Hypercholesterolemia    • Presence of bare metal stent in left circumflex coronary artery 04/09/1999    4 mm Duet   • Prostate cancer  ([facility])    • PVD (peripheral vascular disease) ([facility])     Bilateral common iliac stenosis, left worse than right         Past Surgical History:   Procedure Laterality Date   • OTHER CARDIAC SURGERY      angioplasty and stent of the proximal circumflex coronary artery for acute MI         Current Outpatient Medications   Medication Sig Dispense Refill   • losartan (COZAAR) 50 MG Tab Take 1 Tab by mouth every day. 90 Tab 2   • hydroCHLOROthiazide (HYDRODIURIL) 25 MG Tab Take 1 Tab by mouth every day. 90 Tab 3   • atorvastatin (LIPITOR) 20 MG Tab Take 1 Tab by mouth every day. 90 Tab 3   • vitamin D (CHOLECALCIFEROL) 1000 UNIT Tab Take 1,000 Units by mouth 3 times a week. Tues, Thurs, Sat     • B Complex Vitamins (VITAMIN B COMPLEX) Tab Take 1 Tab by mouth every day.     • ascorbic acid (ASCORBIC ACID) 500 MG Tab Take 500 mg by mouth every day.     • LUTEIN Take 1 Tab by mouth every day.     • aspirin EC (ECOTRIN) 81 MG TBEC Take 81 mg by mouth every day.     • potassium chloride (KLOR-CON) 8 MEQ tablet Take 1 Tab by mouth every day. For further refills please contact new cardiologist. Thank you (Patient not taking: Reported on 8/22/2019) 90 Tab 3   • ketotifen (ZADITOR) 0.025 % ophthalmic solution Place 1 Drop in both eyes every day.       No current facility-administered medications for this visit.          No Known Allergies      Family History   Problem Relation Age of Onset   • Heart Attack Father 60         Social History     Socioeconomic History   • Marital status:      Spouse name: Not on file   • Number of children: Not on file   • Years of education: Not on file   • Highest education level: Not on file   Occupational History   • Not on file   Social Needs   • Financial resource strain: Not on file   • Food insecurity:     Worry: Not on file     Inability: Not on file   • Transportation needs:     Medical: Not on file     Non-medical: Not on file   Tobacco Use   • Smoking status: Never Smoker   •  "Smokeless tobacco: Never Used   Substance and Sexual Activity   • Alcohol use: Yes     Alcohol/week: 1.8 - 2.4 oz     Types: 3 - 4 Standard drinks or equivalent per week   • Drug use: No   • Sexual activity: Not on file   Lifestyle   • Physical activity:     Days per week: Not on file     Minutes per session: Not on file   • Stress: Not on file   Relationships   • Social connections:     Talks on phone: Not on file     Gets together: Not on file     Attends Jain service: Not on file     Active member of club or organization: Not on file     Attends meetings of clubs or organizations: Not on file     Relationship status: Not on file   • Intimate partner violence:     Fear of current or ex partner: Not on file     Emotionally abused: Not on file     Physically abused: Not on file     Forced sexual activity: Not on file   Other Topics Concern   • Not on file   Social History Narrative    ** Merged History Encounter **              Physical Exam:  Ambulatory Vitals  /66 (BP Location: Left arm, Patient Position: Sitting, BP Cuff Size: Adult)   Pulse 72   Ht 1.702 m (5' 7\")   Wt 77.6 kg (171 lb)   SpO2 93%    Oxygen Therapy:  Pulse Oximetry: 93 %  BP Readings from Last 4 Encounters:   08/22/19 150/66   12/20/18 154/62   04/09/18 144/58   09/05/17 140/54       Weight/BMI: Body mass index is 26.78 kg/m².  Wt Readings from Last 4 Encounters:   08/22/19 77.6 kg (171 lb)   12/20/18 80.3 kg (177 lb)   04/09/18 83 kg (183 lb)   09/05/17 79.8 kg (176 lb)       General: No apparent distress  Eyes: nl conjunctiva  ENT: OP clear, normal external appearance of nose and ears  Neck: JVP <8 cm H2O, no carotid bruits  Lungs: normal respiratory effort, CTAB  Heart: RRR, no murmurs, no rubs or gallops, 2+ edema bilateral lower extremities. No LV/RV heave on cardiac palpatation. 2+ bilateral radial pulses.  Unable to palpate bilateral dp pulses, this is chronic.   Abdomen: soft, non tender, non distended, no masses, normal " bowel sounds.  No HSM.  Extremities/MSK: no clubbing, no cyanosis  Neurological: No focal sensory deficits  Psychiatric: Appropriate affect, A/O x 3, intact judgement and insight  Skin: Warm extremities    Exam repeated in full and unchanged except for as noted above.    Lab Data Review:  Lab Results   Component Value Date/Time    CHOLSTRLTOT 134 08/16/2019 07:13 AM    CHOLSTRLTOT 114 08/17/2017 01:50 AM    LDL 64 08/16/2019 07:13 AM    LDL 53 08/17/2017 01:50 AM    HDL 50 08/16/2019 07:13 AM    HDL 48 08/17/2017 01:50 AM    TRIGLYCERIDE 102 08/16/2019 07:13 AM    TRIGLYCERIDE 67 08/17/2017 01:50 AM       Lab Results   Component Value Date/Time    SODIUM 143 08/16/2019 07:13 AM    SODIUM 137 08/17/2017 01:50 AM    POTASSIUM 4.5 08/16/2019 07:13 AM    POTASSIUM 4.4 08/17/2017 01:50 AM    CHLORIDE 107 (H) 08/16/2019 07:13 AM    CHLORIDE 110 08/17/2017 01:50 AM    CO2 21 08/16/2019 07:13 AM    CO2 20 08/17/2017 01:50 AM    GLUCOSE 95 08/16/2019 07:13 AM    GLUCOSE 96 08/17/2017 01:50 AM    BUN 41 (H) 08/16/2019 07:13 AM    BUN 37 (H) 08/17/2017 01:50 AM    CREATININE 1.92 (H) 08/16/2019 07:13 AM    CREATININE 1.69 (H) 08/17/2017 01:50 AM    BUNCREATRAT 21 08/16/2019 07:13 AM     Lab Results   Component Value Date/Time    ALKPHOSPHAT 71 08/16/2019 07:13 AM    ALKPHOSPHAT 53 08/16/2017 07:41 AM    ASTSGOT 22 08/16/2019 07:13 AM    ASTSGOT 18 08/16/2017 07:41 AM    ALTSGPT 16 08/16/2019 07:13 AM    ALTSGPT 11 08/16/2017 07:41 AM    TBILIRUBIN 0.5 08/16/2019 07:13 AM    TBILIRUBIN 0.9 08/16/2017 07:41 AM      Lab Results   Component Value Date/Time    WBC 6.2 08/16/2019 07:13 AM    WBC 6.7 08/17/2017 01:50 AM     No components found for: HBGA1C  No components found for: TROPONIN  No components found for: BNP    [facility] labs (2/2018)  Creatinine 1.9  BUN 37  Potassium 4.4  Sodium 142  LFTs WNL  eGFR 30    Baseline creatinine 1.7        Cardiac Imaging and Procedures Review:    EKG dated 4/9/2018 : 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 The RBBB is new from previous EKG.     Echo dated 2017:   CONCLUSIONS  Severe hypertension.  Left ventricular ejection fraction is visually estimated to be 70%.  Normal left atrial size.  No significant valve disease or flow abnormalities.   Right ventricular systolic pressure is estimated to be 30 mmHg.  Compared to the images of the prior study done  -  there has been   no significant change.       Nuclear Perfusion Imaging (2017):   Myocardial Perfusion   Report   NUCLEAR IMAGING INTERPRETATION   Fixed inferolateral perfusion defect at rest and with stress consistent with    infarction (SSS 6, SDS 0).   Inferolateral hypokinesis. LV ejection fraction = 60%.   ECG INTERPRETATION   Negative stress ECG for ischemia.    STRESS TEST      Exercise   Protoc   Calderon          Duration       05:01    METS:   7.0   ol:                     (m:s):      [facility] (): I have the images he brought for review.  He had a STEMI in the proximal Lcx.     Radiology test Review:  MRI 2017:  1.  Focal high-grade stenosis or occlusion in the distal intracranial RIGHT vertebral artery with slow flow in a small caliber RIGHT vertebral artery  2.  Patent LEFT vertebral artery  3.  No evidence of hemodynamically significant stenosis in either internal carotid artery    1.  Focal high-grade stenosis or occlusion in the distal intracranial RIGHT vertebral artery with slow flow in a small caliber RIGHT vertebral artery  2.  RIGHT PCA arises from the RIGHT ICA    1.  Occlusion of or slow flow within the visualized distal RIGHT vertebral artery  2.  MRI of the brain without contrast otherwise within normal limits for age with atrophy and white matter changes.  3.  Chronic-appearing RIGHT sphenoid sinus disease      Medical Decision Makin. Essential hypertension, benign  poorly controlled. Will not try too aggressive of control given his ataxia symptoms may be related to vertebrobasilar  insufficiency, but I did discuss starting norvasc today.  I believe his previous DONOVAN is likely secondary to hypertension, worsening diastolic function causing pulmonary edema, and this does seem to have improved on higher hctz dose.  -cont hctz to 25mg PO Daily.   -continue losartan  -patient adamant about not starting further BP medications at this time.     2. Atherosclerosis of native coronary artery of native heart with stable angina pectoris (CMS-HCC)  Asymptomatic.  Small scar on last stress perfusion imaging, no ischemia  -continue aspirin/lipitor, cholesterol at goal.     3. CKD (chronic kidney disease) stage 3, GFR 30-59 ml/min  Avoid NSAIDS. Has one functioning kidney  -followed by nephrology.     4. PVD (peripheral vascular disease) (CMS-HCC)  Limits exercise to around 100 years. Bilateral iliac disease, no previous intervention  -continue aspirin/lipitor    5. Ataxia  ? Vertebrobasilar insuffiency, known occlusion of right veterbral artery vs occasional cord compression. Not interested in spinal surgery at this time.   -f/u with Dr. Lew, continue aspirin, lipitor, will not aggressively lower Bp.     6. Thrombocytopenia - recheck platelets with next labs.         Return in about 1 year (around 8/22/2020).      Shaun Crawley MD  [facility]  [place] [facility], [address]  [address] [place] [address]  Phone: [phone]  Fax: [phone]

## 2019-10-01 ENCOUNTER — HOSPITAL ENCOUNTER (OUTPATIENT)
Facility: MEDICAL CENTER | Age: 84
End: 2019-10-01
Attending: FAMILY MEDICINE
Payer: MEDICARE

## 2019-10-01 ENCOUNTER — HOSPITAL ENCOUNTER (OUTPATIENT)
Dept: RADIOLOGY | Facility: MEDICAL CENTER | Age: 84
End: 2019-10-01
Attending: FAMILY MEDICINE
Payer: MEDICARE

## 2019-10-01 ENCOUNTER — OFFICE VISIT (OUTPATIENT)
Dept: URGENT CARE | Facility: PHYSICIAN GROUP | Age: 84
End: 2019-10-01
Payer: MEDICARE

## 2019-10-01 VITALS
OXYGEN SATURATION: 92 % | BODY MASS INDEX: 25.01 KG/M2 | DIASTOLIC BLOOD PRESSURE: 66 MMHG | RESPIRATION RATE: 16 BRPM | HEIGHT: 68 IN | WEIGHT: 165 LBS | SYSTOLIC BLOOD PRESSURE: 132 MMHG | HEART RATE: 77 BPM | TEMPERATURE: 97.4 F

## 2019-10-01 DIAGNOSIS — J22 LRTI (LOWER RESPIRATORY TRACT INFECTION): ICD-10-CM

## 2019-10-01 DIAGNOSIS — R05.9 COUGH: ICD-10-CM

## 2019-10-01 DIAGNOSIS — H10.023 PINK EYE DISEASE OF BOTH EYES: ICD-10-CM

## 2019-10-01 PROCEDURE — 87075 CULTR BACTERIA EXCEPT BLOOD: CPT

## 2019-10-01 PROCEDURE — 99204 OFFICE O/P NEW MOD 45 MIN: CPT | Performed by: FAMILY MEDICINE

## 2019-10-01 PROCEDURE — 87077 CULTURE AEROBIC IDENTIFY: CPT

## 2019-10-01 PROCEDURE — 71046 X-RAY EXAM CHEST 2 VIEWS: CPT

## 2019-10-01 PROCEDURE — 87186 SC STD MICRODIL/AGAR DIL: CPT

## 2019-10-01 PROCEDURE — 87205 SMEAR GRAM STAIN: CPT

## 2019-10-01 PROCEDURE — 87070 CULTURE OTHR SPECIMN AEROBIC: CPT

## 2019-10-01 RX ORDER — DOXYCYCLINE HYCLATE 100 MG
100 TABLET ORAL 2 TIMES DAILY
Qty: 14 TAB | Refills: 0 | Status: SHIPPED | OUTPATIENT
Start: 2019-10-01 | End: 2019-10-08

## 2019-10-01 RX ORDER — POLYMYXIN B SULFATE AND TRIMETHOPRIM 1; 10000 MG/ML; [USP'U]/ML
1 SOLUTION OPHTHALMIC EVERY 4 HOURS
Qty: 10 ML | Refills: 0 | Status: SHIPPED
Start: 2019-10-01 | End: 2020-08-26

## 2019-10-01 NOTE — PROGRESS NOTES
CC:  cough        Cough  This is a new problem. The current episode started 1 week ago. The problem has been gradually worsening. The problem occurs constantly. The cough is productive of sputum. Associated symptoms include ear congestion, ear pain and nasal congestion. Pertinent negatives include no fever, headaches, sweats, weight loss or wheezing. Nothing aggravates the symptoms.  Patient has tried decongestant for the symptoms - minimal relief. There is no history of asthma.         #2.   C/o bilat eye redness and d/c x 1 d          Social History     Tobacco Use   • Smoking status: Never Smoker   • Smokeless tobacco: Never Used   Substance Use Topics   • Alcohol use: Yes     Alcohol/week: 1.8 - 2.4 oz     Types: 3 - 4 Standard drinks or equivalent per week   • Drug use: No         Current Outpatient Medications on File Prior to Visit   Medication Sig Dispense Refill   • losartan (COZAAR) 50 MG Tab Take 1 Tab by mouth every day. 90 Tab 2   • hydroCHLOROthiazide (HYDRODIURIL) 25 MG Tab Take 1 Tab by mouth every day. 90 Tab 3   • atorvastatin (LIPITOR) 20 MG Tab Take 1 Tab by mouth every day. 90 Tab 3   • vitamin D (CHOLECALCIFEROL) 1000 UNIT Tab Take 1,000 Units by mouth 3 times a week. Tues, Thlos, Sat     • B Complex Vitamins (VITAMIN B COMPLEX) Tab Take 1 Tab by mouth every day.     • ascorbic acid (ASCORBIC ACID) 500 MG Tab Take 500 mg by mouth every day.     • ketotifen (ZADITOR) 0.025 % ophthalmic solution Place 1 Drop in both eyes every day.     • LUTEIN Take 1 Tab by mouth every day.     • aspirin EC (ECOTRIN) 81 MG TBEC Take 81 mg by mouth every day.       No current facility-administered medications on file prior to visit.          Past Medical History:   Diagnosis Date   • Benign essential HTN    • CAD (coronary artery disease) 04/09/1999    LCx STEMI   • Chronic kidney disease, stage III (moderate) ([facility])    • CKD (chronic kidney disease) stage 3, GFR 30-59 ml/min ([facility])    • Hypercholesterolemia    •  "Presence of bare metal stent in left circumflex coronary artery 04/09/1999    4 mm Duet   • Prostate cancer (HCC)    • PVD (peripheral vascular disease) (HCC)     Bilateral common iliac stenosis, left worse than right     Family History   Problem Relation Age of Onset   • Heart Attack Father 60         Review of Systems   Constitutional: Negative for fever, chills and malaise/fatigue.   Eyes: Negative for vision changes.   + d/c.    Respiratory: + for cough and sputum production.    Cardiovascular: Negative for chest pain and palpitations.   Gastrointestinal: Negative for nausea, vomiting, abdominal pain, diarrhea and constipation.   Genitourinary: Negative for dysuria, urgency and frequency.   Skin: Negative for rash or  itching.   Neurological: Negative for dizziness and tingling.    Psychiatric/Behavioral: Negative for depression.   Hematologic/lymphatic - denies bruising or excessive bleeding  All other systems reviewed and are negative.       Objective:     /66   Pulse 77   Temp 36.3 °C (97.4 °F) (Temporal)   Resp 16   Ht 1.727 m (5' 8\")   Wt 74.8 kg (165 lb)   SpO2 92%     Physical Exam   Constitutional: patient is oriented to person, place, and time. Patient appears well-developed and well-nourished. No distress.   HENT:   Head: Normocephalic and atraumatic.   Right Ear: External ear normal.   Left Ear: External ear normal.   Nose: Mucosal edema present. Right sinus exhibits no maxillary sinus tenderness. Left sinus exhibits no maxillary sinus tenderness.   Mouth/Throat: Mucous membranes are normal. No oral lesions. Posterior oropharyngeal erythema present. No oropharyngeal exudate or posterior oropharyngeal edema.   Eyes: there is bilat conjunctival redness and yellow d/c         Neck: Normal range of motion. Neck supple. No tracheal deviation present.   Cardiovascular: Normal rate, regular rhythm and normal heart sounds.  Exam reveals no friction rub.    Pulmonary/Chest: Effort normal. No " respiratory distress. Patient has no wheezes. Patient has rhonchi. Patient has no rales.    Musculoskeletal:  exhibits no edema.   Lymphadenopathy:     Patient has no cervical adenopathy  Neurological: patient is alert and oriented to person, place, and time.   Skin: Skin is warm and dry. No rash noted. No erythema.   Psychiatric: patient  has a normal mood and affect.  behavior is normal.   Nursing note and vitals reviewed.         Mayda Haney M.D. 10/1/2019       Narrative       10/1/2019 2:08 PM    HISTORY/REASON FOR EXAM:  Cough for 5 days.      TECHNIQUE/EXAM DESCRIPTION AND NUMBER OF VIEWS:  Two views of the chest.    COMPARISON:  8/16/2017    FINDINGS:  Lungs are mildly hyperinflated.    The mediastinal and cardiac silhouette is unremarkable.    There is atherosclerosis of the aorta.    There is linear scarring or atelectasis in the lingula.    There is no significant pleural effusion.    There is no visible pneumothorax.    There are old healed rib fractures.      Impression       1.  No evidence of acute cardiopulmonary process.          Assessment/Plan:         1. LRTI (lower respiratory tract infection)  Chest x-ray was personally interpreted and reviewed. No acute cardiopulmonary findings. No pulmonary infiltrates or densities. Cardiac silhouette is normal. No hemidiaphragm elevation. No bony abnormalities.  - doxycycline (VIBRAMYCIN) 100 MG Tab; Take 1 Tab by mouth 2 times a day for 7 days.  Dispense: 14 Tab; Refill: 0  - DX-CHEST-2 VIEWS; Future    2. Pink eye disease of both eyes     - ANAEROBIC/AEROBIC/GRAM STAIN    - polymixin-trimethoprim (POLYTRIM) 36331-2.1 UNIT/ML-% Solution; Place 1 Drop in both eyes every 4 hours.  Dispense: 10 mL; Refill: 0     Follow up in one week if no improvement, sooner if symptoms worsen.

## 2019-10-02 LAB
GRAM STN SPEC: NORMAL
SIGNIFICANT IND 70042: NORMAL
SITE SITE: NORMAL
SOURCE SOURCE: NORMAL

## 2019-10-03 ENCOUNTER — TELEPHONE (OUTPATIENT)
Dept: URGENT CARE | Facility: PHYSICIAN GROUP | Age: 84
End: 2019-10-03

## 2019-10-04 LAB
BACTERIA WND AEROBE CULT: ABNORMAL
BACTERIA WND AEROBE CULT: ABNORMAL
GRAM STN SPEC: ABNORMAL
SIGNIFICANT IND 70042: ABNORMAL
SITE SITE: ABNORMAL
SOURCE SOURCE: ABNORMAL

## 2019-10-05 ENCOUNTER — TELEPHONE (OUTPATIENT)
Dept: URGENT CARE | Facility: PHYSICIAN GROUP | Age: 84
End: 2019-10-05

## 2019-10-05 LAB
BACTERIA SPEC ANAEROBE CULT: NORMAL
SIGNIFICANT IND 70042: NORMAL
SITE SITE: NORMAL
SOURCE SOURCE: NORMAL

## 2019-10-11 DIAGNOSIS — E78.5 HYPERLIPIDEMIA, UNSPECIFIED HYPERLIPIDEMIA TYPE: ICD-10-CM

## 2019-10-11 DIAGNOSIS — I11.9 BENIGN HYPERTENSIVE HEART DISEASE WITHOUT HEART FAILURE: ICD-10-CM

## 2019-10-11 RX ORDER — ATORVASTATIN CALCIUM 20 MG/1
TABLET, FILM COATED ORAL
Qty: 90 TAB | Refills: 3 | Status: SHIPPED | OUTPATIENT
Start: 2019-10-11 | End: 2020-10-26

## 2019-11-21 ENCOUNTER — TELEPHONE (OUTPATIENT)
Dept: CARDIOLOGY | Facility: MEDICAL CENTER | Age: 84
End: 2019-11-21

## 2019-11-21 NOTE — TELEPHONE ENCOUNTER
Patient dropped off copy of [facility] invoice showing that lipid panel was not covered.  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  Fax sent to [facility] at [phone] with ICD-10 code for atherosclerosis of native coronary artery of native heart with stable angina (I25.119) in order for lipid panel to be covered by insurance.  Fax placed in scan basket.

## 2020-01-29 ENCOUNTER — TELEPHONE (OUTPATIENT)
Dept: CARDIOLOGY | Facility: MEDICAL CENTER | Age: 85
End: 2020-01-29

## 2020-01-29 NOTE — TELEPHONE ENCOUNTER
Received clearance request from [facility] for pt's upcoming Colonoscopy with Deep (Propofol) sedation scheduled on 3/25/2020 with Dr Juan M Rosa. Please advice on holding pt's Aspirin as well.     To Dr Crawlye

## 2020-02-05 NOTE — TELEPHONE ENCOUNTER
Discussed w/ Dr Crawley, per Dr Crawley, pt moderate risk for planned procedure, continue Aspirin all trough out surgical period if possible.     Clearance form faxed back to [facility], F#[phone].     Called pt and notified, pt verbalizes understanding    Copy of clearance to scanning

## 2020-02-25 ENCOUNTER — HOSPITAL ENCOUNTER (OUTPATIENT)
Dept: RADIOLOGY | Facility: MEDICAL CENTER | Age: 85
End: 2020-02-25
Attending: PHYSICIAN ASSISTANT
Payer: MEDICARE

## 2020-02-25 ENCOUNTER — OFFICE VISIT (OUTPATIENT)
Dept: URGENT CARE | Facility: PHYSICIAN GROUP | Age: 85
End: 2020-02-25
Payer: MEDICARE

## 2020-02-25 VITALS
WEIGHT: 168 LBS | HEIGHT: 67 IN | TEMPERATURE: 98.5 F | DIASTOLIC BLOOD PRESSURE: 52 MMHG | HEART RATE: 64 BPM | SYSTOLIC BLOOD PRESSURE: 136 MMHG | OXYGEN SATURATION: 95 % | BODY MASS INDEX: 26.37 KG/M2 | RESPIRATION RATE: 16 BRPM

## 2020-02-25 DIAGNOSIS — J06.9 VIRAL URI: ICD-10-CM

## 2020-02-25 DIAGNOSIS — R05.9 COUGH: ICD-10-CM

## 2020-02-25 PROCEDURE — 71046 X-RAY EXAM CHEST 2 VIEWS: CPT

## 2020-02-25 PROCEDURE — 99214 OFFICE O/P EST MOD 30 MIN: CPT | Performed by: PHYSICIAN ASSISTANT

## 2020-02-25 RX ORDER — BENZONATATE 100 MG/1
100 CAPSULE ORAL 3 TIMES DAILY PRN
Qty: 60 CAP | Refills: 0 | Status: SHIPPED
Start: 2020-02-25 | End: 2020-08-26

## 2020-02-25 RX ORDER — DOXYCYCLINE HYCLATE 100 MG
100 TABLET ORAL 2 TIMES DAILY
Qty: 14 TAB | Refills: 0 | Status: SHIPPED | OUTPATIENT
Start: 2020-02-25 | End: 2020-03-03

## 2020-02-25 ASSESSMENT — ENCOUNTER SYMPTOMS
BLOOD IN STOOL: 0
SHORTNESS OF BREATH: 0
SORE THROAT: 0
CHILLS: 0
DIARRHEA: 1
SPUTUM PRODUCTION: 0
ABDOMINAL PAIN: 0
VOMITING: 0
NAUSEA: 0
WHEEZING: 0
CONSTIPATION: 0
COUGH: 1
FEVER: 0

## 2020-02-25 NOTE — PROGRESS NOTES
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 Episode onset: 8d. Pertinent negatives include no chills, ear pain, fever, rash, sore throat, shortness of breath or wheezing.     Patient comes clinic complaining of persistent cough.  Notes last 1 week of persistent coughing.  He notes some production with coughing but can be dry at times.  Notes had diarrhea in the week prior.  Notes some resolution of episodes of diarrhea through the day but has had a few episodes of fecal incontinence secondary to persistent coughing.  Denies fevers chills.  Denies ear pain.  Notes mild irritation to throat without pain.  Denies nausea vomiting abdominal pain or rash.  Notes diarrhea does seem to be improving.  Denies blood per stool or melena.  States he did try Pepto which caused stools to be darker for a day following.  Denies change in diet.  Still reports good oral intake of food and fluids.  He denies history of pneumonia.    Review of Systems   Constitutional: Negative for chills and fever.   HENT: Positive for congestion. Negative for ear pain and sore throat.    Respiratory: Positive for cough. Negative for sputum production, shortness of breath and wheezing.    Gastrointestinal: Positive for diarrhea ( resolving, more issue w/ coughing now). Negative for abdominal pain, blood in stool, constipation, melena, nausea and vomiting.   Skin: Negative for rash.     No Known Allergies   I have worn a mask for the entire encounter with this patient.    Objective:   /52   Pulse 64   Temp 36.9 °C (98.5 °F)   Resp 16   Ht 1.702 m (5' 7\")   Wt 76.2 kg (168 lb)   SpO2 95%   BMI 26.31 kg/m²   Physical Exam  Vitals signs and nursing note reviewed.   Constitutional:       General: He is not in acute distress.     Appearance: He is well-developed. He is not diaphoretic.   HENT:      Head: Normocephalic and atraumatic.      " Right Ear: Tympanic membrane, ear canal and external ear normal.      Left Ear: Tympanic membrane, ear canal and external ear normal.      Nose: Nose normal.      Mouth/Throat:      Lips: Pink.      Mouth: Mucous membranes are moist.      Pharynx: Uvula midline. No oropharyngeal exudate or posterior oropharyngeal erythema.      Tonsils: No tonsillar abscesses.   Eyes:      General: No scleral icterus.        Right eye: No discharge.         Left eye: No discharge.      Conjunctiva/sclera: Conjunctivae normal.   Neck:      Musculoskeletal: Neck supple.   Pulmonary:      Effort: Pulmonary effort is normal. No respiratory distress.      Breath sounds: No stridor. Rales ( trace to bases) present. No decreased breath sounds, wheezing or rhonchi.   Musculoskeletal: Normal range of motion.   Lymphadenopathy:      Cervical: No cervical adenopathy.   Skin:     General: Skin is warm and dry.      Coloration: Skin is not pale.   Neurological:      Mental Status: He is alert and oriented to person, place, and time.      Coordination: Coordination normal.     CXR -   IMPRESSION:     1.  No acute cardiopulmonary abnormality identified.     2.  Unchanged right middle lobe and minimal left basilar scar or atelectasis             Last Resulted: 02/25/20  1:02 PM              Assessment/Plan:   1. Cough  - DX-CHEST-2 VIEWS; Future  - benzonatate (TESSALON) 100 MG Cap; Take 1 Cap by mouth 3 times a day as needed for Cough.  Dispense: 60 Cap; Refill: 0  - doxycycline (VIBRAMYCIN) 100 MG Tab; Take 1 Tab by mouth 2 times a day for 7 days.  Dispense: 14 Tab; Refill: 0    2. Viral URI  - benzonatate (TESSALON) 100 MG Cap; Take 1 Cap by mouth 3 times a day as needed for Cough.  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  ER precautions with any worsening symptoms are reviewed with patient/caregiver and they do express understanding    Differential diagnosis, natural history, supportive care, and indications for immediate follow-up discussed.

## 2020-05-05 ENCOUNTER — HOSPITAL ENCOUNTER (OUTPATIENT)
Dept: RADIOLOGY | Facility: MEDICAL CENTER | Age: 85
End: 2020-05-05
Attending: PSYCHIATRY & NEUROLOGY
Payer: MEDICARE

## 2020-05-05 DIAGNOSIS — H49.22 SIXTH NERVE PALSY OF LEFT EYE: ICD-10-CM

## 2020-05-05 PROCEDURE — 70551 MRI BRAIN STEM W/O DYE: CPT

## 2020-06-30 ENCOUNTER — NON-PROVIDER VISIT (OUTPATIENT)
Dept: NEUROLOGY | Facility: MEDICAL CENTER | Age: 85
End: 2020-06-30
Payer: MEDICARE

## 2020-06-30 DIAGNOSIS — G54.1 LUMBOSACRAL PLEXUS DISORDERS: ICD-10-CM

## 2020-06-30 PROCEDURE — 95886 MUSC TEST DONE W/N TEST COMP: CPT | Mod: RT | Performed by: SPECIALIST

## 2020-06-30 PROCEDURE — 95908 NRV CNDJ TST 3-4 STUDIES: CPT | Performed by: SPECIALIST

## 2020-06-30 NOTE — PROCEDURES
NERVE CONDUCTION STUDIES AND ELECTROMYOGRAPHY REPORT        06/30/20      Referring provider: Pcp Pt States None      SUMMARY OF PATIENT'S CLINICAL HISTORY,PHYSICAL EXAM, AND RATIONALE FOR TESTING:    Mr. Luis Antonio Turner MD 89 y.o. presenting with right lower extremity pain and weakness.    Past Medical History is significant for :   Past Medical History:   Diagnosis Date   • Benign essential HTN    • CAD (coronary artery disease) 04/09/1999    LCx STEMI   • Chronic kidney disease, stage III (moderate) ([facility])    • CKD (chronic kidney disease) stage 3, GFR 30-59 ml/min ([facility])    • Hypercholesterolemia    • Presence of bare metal stent in left circumflex coronary artery 04/09/1999    4 mm Duet   • Prostate cancer ([facility])    • PVD (peripheral vascular disease) ([facility])     Bilateral common iliac stenosis, left worse than right           The electrodiagnostic studies were performed to evaluate for possible radiculopathy versus plexopathy.      ELECTRODIAGNOSTIC EXAMINATION:  Nerve conduction studies (NCS) and electromyography (EMG) are utilized to evaluate direct or indirect damage to the peripheral nervous system. NCS are performed to measure the nerve(s) response(s) to electrostimulation across a given nerve segment. EMG evaluates the passive and active electrical activity of the muscle(s) in question.  Muscles are innervated by specific peripheral nerves and roots. Often times, several nerves the muscle to be examined in order to determine the presence or absence of the disease process. Furthermore, nerves and muscles may need to be tested in a [id number] comparison, as well as in additional extremities, as this may be crucial in characterizing the extent of the disease process, which may be diffuse or isolated and of varying degree of severity. The extent of the neurodiagnostic exam is justified as it may help arrive to a proper diagnosis, which ultimately may contribute to better management of the patient.  Therefore, the nerves to muscles examined during the study were medically necessary.    Unless otherwise noted, temperature of the extremity(s) study was monitored before and during the examination and remained between 32 and 36 degrees C for the upper extremities, and between 30 and 36 degrees C for the lower extremities.      NERVE CONDUCTION STUDIES:  Sensory nerves:   -Right sural nerves were tested. The response was not elicitable with antidromic stimulation.  Motor nerves:     -Right tibial nerves were examined. Recording electrodes placed at the Abductor  Hallucis muscles. The response was within normal limits.  -Right deep Peroneal motor nerve was examined. Recording electrodes were placed at the Extensor Digitorum Brevis muscles.The response was within normal limits.     No temporal dispersion or conduction block observed in any of the motor nerves examined.    LATE RESPONSES:  F waves were obtained and the following nerves: Right tibial  The responses within normal limits for the patient's age.        ELECTROMYOGRAPHY:  The study was performed the concentric needle electrode. Fibrillation and fasciculation activity is graded by convention from none (0) to continuous (4+).  Needle electrode examination was performed in the following muscles: Right iliopsoas, vastus lateralis, tibialis anterior, gastrocnemius, extensor digitorum brevis.  The iliopsoas exhibited both acute and chronic denervation changes with fibrillation potentials and reduced recruitment of increased amplitude motor units.  The other muscles tested demonstrated normal inserctional activity, normal motor unit morphology and recruitment.     Nerve Conduction Studies     Stim Site NR Onset (ms) Norm Onset (ms) O-P Amp (µV) Norm O-P Amp Site1 Site2 Delta-P (ms) Dist (cm) Jarred (m/s) Norm Jarred (m/s)   Right Sural Anti Sensory (Lat Mall)   Calf *NR    >5.0 Calf Lat Mall  14.0  >35        Stim Site NR Onset (ms) Norm Onset (ms) O-P Amp (mV) Norm O-P  Amp Site1 Site2 Delta-0 (ms) Dist (cm) Jarred (m/s) Norm Jarred (m/s)   Right Peroneal EDB Motor (Ext Dig Brev)   Ankle    4.5 <6.1 3.7 >2.5 B Fib Ankle 6.5 33.5 52 >38   B Fib    11.0  3.1  Poplt B Fib 1.9 10.0 53 >40   Poplt    12.9  2.7          Right Tibial Motor (Abd Meraz Brev)   Ankle    3.7 <6.1 3.7 >3.0 Knee Ankle 9.0 42.0 47 >35   Knee    12.7  2.5            F Wave Studies     NR F-Lat (ms) Lat Norm (ms)   Right Tibial (Abd Hallucis)      55.34 <57                 Electromyography     Side Muscle Nerve Root Ins Act Fibs Psw Amp Dur Poly Recrt Int Pat Comment   Right VastusLat Femoral L2-4 Nml Nml Nml Nml Nml 0 Nml Nml    Right AntTibialis Dp Br Fibular L4-5 Nml Nml Nml Nml Nml 0 Nml Nml    Right Gastroc Tibial S1-2 Nml Nml Nml Nml Nml 0 Nml Nml    Right Ext Dig Brev Dp Br Fibular L5, S1 Nml Nml Nml Nml Nml 0 Nml Nml    Right Iliopsoas Femoral L2-3 *Incr *2+ Nml *Incr Nml 0 *Reduced *75%            DIAGNOSTIC INTERPRETATION:   Extensive electrodiagnostic studies were performed to the right lower extremity.  The results are as follows:    1. Acute denervation changes noted in the right iliopsoas muscle.  This is suggestive of either a high lumbar radiculopathy or lumbar plexopathy.  Clinical correlation is suggested.    2.  Normal motor nerve conduction studies right lower extremity.  There was an absent sural sensory response which is not necessarily an abnormal finding in a 89-year-old individual.        JULIANNA Mccoy M.D.

## 2020-07-10 ENCOUNTER — TELEPHONE (OUTPATIENT)
Dept: CARDIOLOGY | Facility: MEDICAL CENTER | Age: 85
End: 2020-07-10

## 2020-07-10 DIAGNOSIS — I11.9 BENIGN HYPERTENSIVE HEART DISEASE WITHOUT HEART FAILURE: ICD-10-CM

## 2020-07-10 RX ORDER — POTASSIUM CHLORIDE 600 MG/1
8 TABLET, FILM COATED, EXTENDED RELEASE ORAL DAILY
Qty: 90 TAB | Refills: 0 | Status: CANCELLED | OUTPATIENT
Start: 2020-07-10

## 2020-07-11 DIAGNOSIS — I25.118 ATHEROSCLEROSIS OF NATIVE CORONARY ARTERY OF NATIVE HEART WITH STABLE ANGINA PECTORIS (HCC): Primary | ICD-10-CM

## 2020-07-14 RX ORDER — POTASSIUM CHLORIDE 600 MG/1
8 TABLET, FILM COATED, EXTENDED RELEASE ORAL DAILY
Qty: 90 TAB | Refills: 3 | Status: SHIPPED
Start: 2020-07-14 | End: 2020-08-26

## 2020-08-26 ENCOUNTER — OFFICE VISIT (OUTPATIENT)
Dept: CARDIOLOGY | Facility: MEDICAL CENTER | Age: 85
End: 2020-08-26
Payer: MEDICARE

## 2020-08-26 VITALS
HEART RATE: 66 BPM | HEIGHT: 68 IN | WEIGHT: 164 LBS | SYSTOLIC BLOOD PRESSURE: 156 MMHG | OXYGEN SATURATION: 94 % | BODY MASS INDEX: 24.86 KG/M2 | DIASTOLIC BLOOD PRESSURE: 60 MMHG

## 2020-08-26 DIAGNOSIS — C61 MALIGNANT NEOPLASM OF PROSTATE (HCC): ICD-10-CM

## 2020-08-26 DIAGNOSIS — I25.118 ATHEROSCLEROSIS OF NATIVE CORONARY ARTERY OF NATIVE HEART WITH STABLE ANGINA PECTORIS (HCC): ICD-10-CM

## 2020-08-26 DIAGNOSIS — N18.30 CKD (CHRONIC KIDNEY DISEASE) STAGE 3, GFR 30-59 ML/MIN: ICD-10-CM

## 2020-08-26 DIAGNOSIS — Z71.89 GOALS OF CARE, COUNSELING/DISCUSSION: ICD-10-CM

## 2020-08-26 DIAGNOSIS — E78.00 HYPERCHOLESTEROLEMIA: ICD-10-CM

## 2020-08-26 DIAGNOSIS — I73.9 PVD (PERIPHERAL VASCULAR DISEASE) (HCC): ICD-10-CM

## 2020-08-26 DIAGNOSIS — I65.01 OCCLUSION OF RIGHT VERTEBRAL ARTERY: ICD-10-CM

## 2020-08-26 PROBLEM — M48.02 SPINAL STENOSIS IN CERVICAL REGION: Status: ACTIVE | Noted: 2017-09-15

## 2020-08-26 PROBLEM — M50.30 DEGENERATION OF CERVICAL INTERVERTEBRAL DISC: Status: ACTIVE | Noted: 2017-09-15

## 2020-08-26 PROCEDURE — 99214 OFFICE O/P EST MOD 30 MIN: CPT | Performed by: INTERNAL MEDICINE

## 2020-08-26 PROCEDURE — 99497 ADVNCD CARE PLAN 30 MIN: CPT | Performed by: INTERNAL MEDICINE

## 2020-08-26 ASSESSMENT — ENCOUNTER SYMPTOMS
DOUBLE VISION: 1
BRUISES/BLEEDS EASILY: 1

## 2020-08-26 NOTE — PROGRESS NOTES
Cardiology Follow-up Consultation Note    Date of note:  8/26/2020  Primary Care Provider: Pcp Pt States None  Referring Provider: Luis Antonio Lugo M.D.     Patient Name: Luis Antonio Turner   YOB: 1928  MRN:              [id number]    Chief Complaint: chest pain    History of Present Illness: Luis Antonio Yueaddis MD Johnny is a 89 y.o. 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    At our visit, 4/9/2018:  Stress test negative in September.  Was ordered for angina no recurrence.    In terms of his hypertension, his SBP at home is 140s-170s.      Does have some worsening DONOVAN on hills.      In terms of CKD, last creatinine slightly higher has stopped NSAIDS.    At our visit, 12/20/2018:  Had an episode of ataxia in September for 1 week.     In terms of hypertension, poorly controlled in the 140s-150s.     At our visit,   8/22/2019:  In terms of hypertension, BP ranges in the 120s-150s. Sometimes skipping losartan and hctz as does not feel well in the 120s. Not taking potassium, but electrolytes well controlled with dates.     Interim Events:  Had transient double vision. Work-up from MRI showed nothing as a clear cause. No recurrence.      Had acute hip pain, diagnosed with lumbar radiculopathy.     In terms of CKD, stable.     In terms of CAD, no angina. Walks daily.     Stable ataxia.     BP remains very sensitive to sodium. At home typically 130s-140s.     Review of Systems   HENT: Positive for hearing loss.    Eyes: Positive for double vision.   Hematologic/Lymphatic: Bruises/bleeds easily.   Musculoskeletal: Positive for joint pain.     Constitution: Negative for chills, fever and night sweats.   HENT: Negative for nosebleeds.    Eyes: Negative for vision loss in left eye and vision loss in right eye.   Respiratory: Negative for hemoptysis.    Gastrointestinal:  Negative for hematemesis, hematochezia and melena.   Genitourinary: Negative for hematuria.   Neurological: Negative for focal weakness, numbness and paresthesias.     All other systems reviewed and discussed using a comprehensive questionnaire and are negative.       Past Medical History:   Diagnosis Date   • Benign essential HTN    • CAD (coronary artery disease) 04/09/1999    LCx STEMI   • Chronic kidney disease, stage III (moderate) ([facility])    • CKD (chronic kidney disease) stage 3, GFR 30-59 ml/min ([facility])    • Hypercholesterolemia    • Presence of bare metal stent in left circumflex coronary artery 04/09/1999    4 mm Duet   • Prostate cancer ([facility]ly [facility])    • PVD (peripheral vascular disease) ([facility]ly [facility])     Bilateral common iliac stenosis, left worse than right         Past Surgical History:   Procedure Laterality Date   • OTHER CARDIAC SURGERY      angioplasty and stent of the proximal circumflex coronary artery for acute MI         Current Outpatient Medications   Medication Sig Dispense Refill   • losartan (COZAAR) 50 MG Tab Take 1 Tab by mouth every day. 90 Tab 2   • hydroCHLOROthiazide (HYDRODIURIL) 25 MG Tab Take 1 Tab by mouth every day. 90 Tab 2   • atorvastatin (LIPITOR) 20 MG Tab TAKE ONE TABLET BY MOUTH ONE TIME DAILY  90 Tab 3   • vitamin D (CHOLECALCIFEROL) 1000 UNIT Tab Take 1,000 Units by mouth 3 times a week. Tues, Thurs, Sat     • B Complex Vitamins (VITAMIN B COMPLEX) Tab Take 1 Tab by mouth every day.     • ascorbic acid (ASCORBIC ACID) 500 MG Tab Take 500 mg by mouth every day.     • LUTEIN Take 1 Tab by mouth every day.     • aspirin EC (ECOTRIN) 81 MG TBEC Take 81 mg by mouth every day.       No current facility-administered medications for this visit.          No Known Allergies      Family History   Problem Relation Age of Onset   • Heart Attack Father 60         Social History     Socioeconomic History   • Marital status:      Spouse name: Not on file   • Number of children: Not on file   •  "Years of education: Not on file   • Highest education level: Not on file   Occupational History   • Not on file   Social Needs   • Financial resource strain: Not on file   • Food insecurity     Worry: Not on file     Inability: Not on file   • Transportation needs     Medical: Not on file     Non-medical: Not on file   Tobacco Use   • Smoking status: Never Smoker   • Smokeless tobacco: Never Used   Substance and Sexual Activity   • Alcohol use: Yes     Alcohol/week: 1.8 - 2.4 oz     Types: 3 - 4 Standard drinks or equivalent per week   • Drug use: No   • Sexual activity: Not on file   Lifestyle   • Physical activity     Days per week: Not on file     Minutes per session: Not on file   • Stress: Not on file   Relationships   • Social connections     Talks on phone: Not on file     Gets together: Not on file     Attends Restorationist service: Not on file     Active member of club or organization: Not on file     Attends meetings of clubs or organizations: Not on file     Relationship status: Not on file   • Intimate partner violence     Fear of current or ex partner: Not on file     Emotionally abused: Not on file     Physically abused: Not on file     Forced sexual activity: Not on file   Other Topics Concern   • Not on file   Social History Narrative    ** Merged History Encounter **              Physical Exam:  Ambulatory Vitals  /60 (BP Location: Left arm, Patient Position: Sitting)   Pulse 66   Ht 1.727 m (5' 8\")   Wt 74.4 kg (164 lb)   SpO2 94%    Oxygen Therapy:  Pulse Oximetry: 94 %  BP Readings from Last 4 Encounters:   08/26/20 156/60   02/25/20 136/52   10/01/19 132/66   08/22/19 150/66       Weight/BMI: Body mass index is 24.94 kg/m².  Wt Readings from Last 4 Encounters:   08/26/20 74.4 kg (164 lb)   02/25/20 76.2 kg (168 lb)   10/01/19 74.8 kg (165 lb)   08/22/19 77.6 kg (171 lb)       General: No apparent distress  Eyes: nl conjunctiva  ENT: OP covered by mask  Neck: JVP 3-4, no carotid " bruits  Lungs: normal respiratory effort, CTAB  Heart: RRR, no murmurs, no rubs or gallops, 1+ edema bilateral lower extremities. No LV/RV heave on cardiac palpatation. 2+ bilateral radial pulses.  1+ right dp pulse. No palpable left dp pulse. Chronic.   Abdomen: soft, non tender, non distended, no masses, normal bowel sounds.  No HSM.  Extremities/MSK: no clubbing, no cyanosis  Neurological: No focal sensory deficits  Psychiatric: Appropriate affect, A/O x 3, intact judgement and insight  Skin: Warm extremities    Exam repeated in full and unchanged except for as noted above.    Lab Data Review:  Lab Results   Component Value Date/Time    CHOLSTRLTOT 134 08/16/2019 07:13 AM    CHOLSTRLTOT 114 08/17/2017 01:50 AM    LDL 64 08/16/2019 07:13 AM    LDL 53 08/17/2017 01:50 AM    HDL 50 08/16/2019 07:13 AM    HDL 48 08/17/2017 01:50 AM    TRIGLYCERIDE 102 08/16/2019 07:13 AM    TRIGLYCERIDE 67 08/17/2017 01:50 AM       Lab Results   Component Value Date/Time    SODIUM 143 08/16/2019 07:13 AM    SODIUM 137 08/17/2017 01:50 AM    POTASSIUM 4.5 08/16/2019 07:13 AM    POTASSIUM 4.4 08/17/2017 01:50 AM    CHLORIDE 107 (H) 08/16/2019 07:13 AM    CHLORIDE 110 08/17/2017 01:50 AM    CO2 21 08/16/2019 07:13 AM    CO2 20 08/17/2017 01:50 AM    GLUCOSE 95 08/16/2019 07:13 AM    GLUCOSE 96 08/17/2017 01:50 AM    BUN 41 (H) 08/16/2019 07:13 AM    BUN 37 (H) 08/17/2017 01:50 AM    CREATININE 1.92 (H) 08/16/2019 07:13 AM    CREATININE 1.69 (H) 08/17/2017 01:50 AM    BUNCREATRAT 21 08/16/2019 07:13 AM     Lab Results   Component Value Date/Time    ALKPHOSPHAT 71 08/16/2019 07:13 AM    ALKPHOSPHAT 53 08/16/2017 07:41 AM    ASTSGOT 22 08/16/2019 07:13 AM    ASTSGOT 18 08/16/2017 07:41 AM    ALTSGPT 16 08/16/2019 07:13 AM    ALTSGPT 11 08/16/2017 07:41 AM    TBILIRUBIN 0.5 08/16/2019 07:13 AM    TBILIRUBIN 0.9 08/16/2017 07:41 AM      Lab Results   Component Value Date/Time    WBC 6.2 08/16/2019 07:13 AM    WBC 6.7 08/17/2017 01:50 AM      No components found for: HBGA1C  No components found for: TROPONIN  No components found for: BNP    [facility] labs (2/2018)  Creatinine 1.9  BUN 37  Potassium 4.4  Sodium 142  LFTs WNL  eGFR 30    Baseline creatinine 1.7      [facility] labs 7/2020  hgb 13.8  plt 142  Bun 39  Creatinine 2.2  eGFR 26  Potassium 4.5  Sodium 142  LFTs WNL    2/2020  Cholesterol panel looks great.       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 The RBBB is new from previous EKG.     Echo dated 8/2017:   CONCLUSIONS  Severe hypertension.  Left ventricular ejection fraction is visually estimated to be 70%.  Normal left atrial size.  No significant valve disease or flow abnormalities.   Right ventricular systolic pressure is estimated to be 30 mmHg.  Compared to the images of the prior study done 2015 -  there has been   no significant change.       Nuclear Perfusion Imaging (9/2017):   Myocardial Perfusion   Report   NUCLEAR IMAGING INTERPRETATION   Fixed inferolateral perfusion defect at rest and with stress consistent with    infarction (SSS 6, SDS 0).   Inferolateral hypokinesis. LV ejection fraction = 60%.   ECG INTERPRETATION   Negative stress ECG for ischemia.    STRESS TEST      Exercise   Protoc   Calderon          Duration       05:01    METS:   7.0   ol:                     (m:s):      [facility] (1999): I have the images he brought for review.  He had a STEMI in the proximal Lcx.     Radiology test Review:  MRI 8/2017:  1.  Focal high-grade stenosis or occlusion in the distal intracranial RIGHT vertebral artery with slow flow in a small caliber RIGHT vertebral artery  2.  Patent LEFT vertebral artery  3.  No evidence of hemodynamically significant stenosis in either internal carotid artery    1.  Focal high-grade stenosis or occlusion in the distal intracranial RIGHT vertebral artery with slow flow in a small caliber RIGHT vertebral artery  2.  RIGHT PCA arises from the  RIGHT ICA    1.  Occlusion of or slow flow within the visualized distal RIGHT vertebral artery  2.  MRI of the brain without contrast otherwise within normal limits for age with atrophy and white matter changes.  3.  Chronic-appearing RIGHT sphenoid sinus disease      Brain MRI 2020:  IMPRESSION:     1.  The left cavernous sinuses is slightly asymmetric and demonstrates isointense T2 signal along its lateral wall. This is unchanged since the previous study. This may be a normal variant. However in view of left sixth opacity the possibility of   cavernous sinus lesion cannot be excluded. Postcontrast study is recommended.  2.  Moderate age-appropriate cerebral atrophy.  3.  Small cortical infarct in the left frontal lobe.  4.  Moderate chronic microvascular ischemic disease.  5.  Opacification of the right side of the sphenoid sinus.    Medical Decision Makin. Essential hypertension, benign  poorly controlled. 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  I believe his previous DONOVAN is likely secondary to hypertension, worsening diastolic function causing pulmonary edema, and this does seem to have improved on higher hctz dose.  -cont hctz 25mg PO Daily.   -continue losartan 50mg PO daily  -patient adamant about not starting further BP medications at this time. He states his BP at home is typically in the 130s.     2. Atherosclerosis of native coronary artery of native heart with stable angina pectoris (CMS-HCC)  Asymptomatic.  Small scar on last stress perfusion imaging, no ischemia  -continue aspirin/lipitor, cholesterol at goal.     3. CKD (chronic kidney disease) stage 3, GFR 30-59 ml/min  Avoid NSAIDS. Has one functioning kidney  -followed by nephrology.     4. PVD (peripheral vascular disease) (CMS-HCC)  Limits exercise to around 100 yards. Bilateral iliac disease, no previous  intervention  -continue aspirin/lipitor    5. Ataxia  ? Vertebrobasilar insuffiency, known occlusion of right veterbral artery vs occasional cord compression. Not interested in spinal surgery at this time.   -f/u with Dr. Mccoy, continue aspirin, lipitor, will not aggressively lower Bp.     6. Thrombocytopenia - stable, further work-up if falls below 100.     7. Diplopia - possible cranial nerve spasm or deficit, resolved.  -CTM. Consider switching to plavix if recurs.   -request notes from Dr. Cobb.     8. Goals of care - I spent 21 minutes today discussing prognosis, current ability, and goals of care. We did confirm he wanted to be DNR/DNI and filled out a POLST stating such. He also stated he did not want artificial nutrition. We also gave him a healthcare advanced directive.           Return in about 6 months (around 2/26/2021).      Shaun Crawley MD  [facility]  [facility], [address]  [address] [address]  Phone: [phone]  Fax: [phone]

## 2020-11-10 DIAGNOSIS — E78.5 HYPERLIPIDEMIA, UNSPECIFIED HYPERLIPIDEMIA TYPE: ICD-10-CM

## 2020-11-10 DIAGNOSIS — I11.9 BENIGN HYPERTENSIVE HEART DISEASE WITHOUT HEART FAILURE: ICD-10-CM

## 2020-11-10 RX ORDER — ATORVASTATIN CALCIUM 20 MG/1
TABLET, FILM COATED ORAL
Qty: 90 TAB | Refills: 3 | Status: SHIPPED | OUTPATIENT
Start: 2020-11-10 | End: 2021-11-11

## 2020-11-18 DIAGNOSIS — I10 ESSENTIAL HYPERTENSION: ICD-10-CM

## 2020-11-20 RX ORDER — HYDROCHLOROTHIAZIDE 25 MG/1
TABLET ORAL
Qty: 90 TAB | Refills: 3 | Status: SHIPPED | OUTPATIENT
Start: 2020-11-20 | End: 2021-11-11

## 2020-11-20 NOTE — TELEPHONE ENCOUNTER
Refilling hctz. Can we call him to ensure he's being followed by nephrology and has recent BMP we can obtain from somewhere? Thanks!

## 2020-11-20 NOTE — TELEPHONE ENCOUNTER
Attempted to reach patient. No answer and unable to leave message.    Patient being followed by Dr. Martin Garduno D.O. at [facility]. Most recent records in in media.     Will attempt to contact patient next business day.

## 2020-11-24 ENCOUNTER — TELEPHONE (OUTPATIENT)
Dept: CARDIOLOGY | Facility: MEDICAL CENTER | Age: 85
End: 2020-11-24

## 2020-11-25 NOTE — TELEPHONE ENCOUNTER
Medication Refill    Summer Mckeon R.N. routed conversation to You 7 minutes ago (5:23 PM)      Summer Mckeon R.N. 4 days ago        Attempted to reach patient. No answer and unable to leave message.     Patient being followed by Dr. Martin Garduno D.O. at [facility] [place]. Most recent records in in media.      Will attempt to contact patient next business day.         Documentation       Shaun Crawley M.D. routed conversation to Summer Mckeon R.N. 4 days ago      Shaun Crawley M.D. 4 days ago        Refilling hctz. Can we call him to ensure he's being followed by nephrology and has recent BMP we can obtain from somewhere? Thanks!         Documentation       Stephanie Wade, Med Ass't routed conversation to Shaun Crawley M.D. 4 days ago      Int, [facility] E-Prescribing / Pharmacy Desk 6 days ago     Request received via interface.

## 2020-12-13 ENCOUNTER — OFFICE VISIT (OUTPATIENT)
Dept: URGENT CARE | Facility: PHYSICIAN GROUP | Age: 85
End: 2020-12-13
Payer: MEDICARE

## 2020-12-13 ENCOUNTER — HOSPITAL ENCOUNTER (OUTPATIENT)
Dept: RADIOLOGY | Facility: MEDICAL CENTER | Age: 85
End: 2020-12-13
Attending: FAMILY MEDICINE
Payer: MEDICARE

## 2020-12-13 VITALS
DIASTOLIC BLOOD PRESSURE: 66 MMHG | OXYGEN SATURATION: 97 % | TEMPERATURE: 98.6 F | WEIGHT: 164 LBS | SYSTOLIC BLOOD PRESSURE: 158 MMHG | BODY MASS INDEX: 25.74 KG/M2 | HEIGHT: 67 IN | HEART RATE: 65 BPM

## 2020-12-13 DIAGNOSIS — S20.211A RIB CONTUSION, RIGHT, INITIAL ENCOUNTER: ICD-10-CM

## 2020-12-13 PROCEDURE — 71101 X-RAY EXAM UNILAT RIBS/CHEST: CPT | Mod: RT

## 2020-12-13 PROCEDURE — 99214 OFFICE O/P EST MOD 30 MIN: CPT | Performed by: FAMILY MEDICINE

## 2020-12-13 RX ORDER — TRAMADOL HYDROCHLORIDE 50 MG/1
50 TABLET ORAL EVERY 8 HOURS PRN
Qty: 10 TAB | Refills: 0 | Status: SHIPPED | OUTPATIENT
Start: 2020-12-13 | End: 2020-12-20

## 2020-12-13 ASSESSMENT — PAIN SCALES - GENERAL: PAINLEVEL: 8=MODERATE-SEVERE PAIN

## 2020-12-13 NOTE — PROGRESS NOTES
"Subjective:         Chief Complaint   Patient presents with   • Rib Injury     fell in back yard and hit the edge of patio x2 days ago, right side rib pain                  Rib Pain     2 d ago - status post ground level fall onto stone patio planter edge to rt ribcage.         Pt c/o dull, \"deep\", constant right lower ribcage pain x 4 days.    Pain is constant and worse with deep breathing.       Pain  improved with tramadol.        denies dyspnea.       Pertinent negatives include no claudication, cough, exertional chest pressure, fever, nausea, near-syncope, numbness, syncope or vomiting.           Past Medical History:   Diagnosis Date   • Benign essential HTN    • CAD (coronary artery disease) 04/09/1999    LCx STEMI   • Chronic kidney disease, stage III (moderate)    • CKD (chronic kidney disease) stage 3, GFR 30-59 ml/min    • Hypercholesterolemia    • Presence of bare metal stent in left circumflex coronary artery 04/09/1999    4 mm Duet   • Prostate cancer (HCC)    • PVD (peripheral vascular disease) (HCC)     Bilateral common iliac stenosis, left worse than right         Social History     Tobacco Use   • Smoking status: Never Smoker   • Smokeless tobacco: Never Used   Substance Use Topics   • Alcohol use: Yes     Alcohol/week: 1.8 - 2.4 oz     Types: 3 - 4 Standard drinks or equivalent per week     Comment: occ   • Drug use: No              Review of Systems:  Review of Systems   Constitutional: Negative for fever.   HENT: no neck pain, headache or dizziness  Eyes: denies vision changes  Respiratory: no cough, congestion, SOB  Cardiovascular: denies palpations     Gastrointestinal: denies diarrhea, abdominal pain or constipation.  No blood in stool.  Musculoskeletal: denies back pain or joint pain    Skin: no itching or rash  Neurological: No numbness or tingling.   10 point ROS otherwise negative, except per HPI         Objective:     /66   Pulse 65   Temp 37 °C (98.6 °F)   Ht 1.702 m (5' 7\")   " Wt 74.4 kg (164 lb)   SpO2 97%       Physical Exam   Constitutional: pt is oriented to person, place, and time. Pt appears well-developed and well-nourished.   HENT:   Head: Normocephalic and atraumatic.   Mouth/Throat: Oropharynx is clear and moist.   Eyes: Conjunctivae and EOM are normal. Pupils are equal, round, and reactive to light. No scleral icterus.   Neck: Normal range of motion. Neck supple. No JVD present. No thyromegaly present.   Cardiovascular: Normal rate, regular rhythm, normal heart sounds and intact distal pulses.  Exam reveals no gallop and no friction rub.    No murmur heard.  Pulmonary/Chest: Effort normal and breath sounds normal. No respiratory distress. Pt has no wheezes. Pt has no rales. Pt exhibits point tenderness over several lower ribs on right side and there was some minor bruising noted.   Abdominal: Soft.   Musculoskeletal: Normal range of motion. Pt exhibits no edema.   Lymphadenopathy:     Pt has no cervical adenopathy.   Neurological: pt is alert and oriented to person, place, and time. No cranial nerve deficit.   Skin: Skin is warm and dry. No erythema.   Psychiatric: pt has a normal mood and affect. patient's behavior is normal.     [id number] (WITH 1-VIEW CXR) RIGHT  Order: [id number]  Status:  Final result   Visible to patient:  No (not released) Next appt:  None Dx:  Rib contusion, right, initial encounter  Details    Reading Physician Reading Date Result Priority   Altaf Brown M.D.  [phone] 12/13/2020 Urgent Care      Narrative & Impression        12/13/2020 12:35 PM     HISTORY/REASON FOR EXAM:  Pain Following Trauma.  Fall right flank pain     TECHNIQUE/EXAM DESCRIPTION AND NUMBER OF VIEWS:  4 images of the right ribs and chest.     COMPARISON: Chest radiograph 2/25/2020     FINDINGS:  Cardiomediastinal silhouette is stable. Aortic calcified atherosclerotic plaque.     No focal consolidation, pleural effusion, pulmonary edema or pneumothorax. Bibasilar mild  scarring/postinflammatory changes are again noted.     There are multiple old right rib fractures again noted. No displaced acute right rib fracture is seen. Acute nondisplaced rib fractures could be present and not visualized. Bone mineralization is decreased.     IMPRESSION:     Chronic right rib fractures. No definite acute displaced right rib fracture is seen.     Chronic bibasilar scarring/post inflammatory changes. No acute cardiopulmonary abnormality.                Assessment/Plan:      1. Rib contusion, right, initial encounter  Chest x-ray was personally interpreted and reviewed. No acute rib fractures.    There were several old rt rib fractures.        - traMADol (ULTRAM) 50 MG Tab; Take 1 Tab by mouth every 8 hours as needed for up to 7 days.  Dispense: 10 Tab; Refill: 0  Also advised to f/u with PCP for consideration for DEXA scan

## 2020-12-21 DIAGNOSIS — I10 ESSENTIAL HYPERTENSION: ICD-10-CM

## 2020-12-22 RX ORDER — LOSARTAN POTASSIUM 50 MG/1
TABLET ORAL
Qty: 90 TAB | Refills: 3 | Status: SHIPPED | OUTPATIENT
Start: 2020-12-22 | End: 2022-01-05

## 2020-12-31 ENCOUNTER — TELEPHONE (OUTPATIENT)
Dept: CARDIOLOGY | Facility: MEDICAL CENTER | Age: 85
End: 2020-12-31

## 2021-01-01 NOTE — TELEPHONE ENCOUNTER
----- Message from Gabriella Palma, Med Ass't sent at 12/31/2020  3:28 PM PST -----  Regarding: Needs refill for Losartan  Needs refill for Losartan.  Will  run out before office opens.  Uses  [facility].

## 2021-01-01 NOTE — TELEPHONE ENCOUNTER
Needs refill for Losartan  Received: Today  Message Contents   Gabriella Palma, Med Ass't  P Haris Wilson/Asaf   Cc: Radha Bhakta R.N.             Needs refill for Losartan.  Will   run out before office opens.  Uses   [facility].        Returned pt. call notified him that JM sent in a new Rx to the [facility] pharmacy in [place] on 12/22. Pt. Was appreciative of call, he states he will follow up with his pharmacy.

## 2021-01-06 DIAGNOSIS — Z23 NEED FOR VACCINATION: ICD-10-CM

## 2021-06-24 ENCOUNTER — OFFICE VISIT (OUTPATIENT)
Dept: CARDIOLOGY | Facility: MEDICAL CENTER | Age: 86
End: 2021-06-24
Payer: MEDICARE

## 2021-06-24 VITALS
HEART RATE: 62 BPM | WEIGHT: 155 LBS | BODY MASS INDEX: 24.33 KG/M2 | DIASTOLIC BLOOD PRESSURE: 54 MMHG | SYSTOLIC BLOOD PRESSURE: 140 MMHG | OXYGEN SATURATION: 96 % | HEIGHT: 67 IN

## 2021-06-24 DIAGNOSIS — I25.118 ATHEROSCLEROSIS OF NATIVE CORONARY ARTERY OF NATIVE HEART WITH STABLE ANGINA PECTORIS (HCC): ICD-10-CM

## 2021-06-24 DIAGNOSIS — R07.89 ATYPICAL CHEST PAIN: ICD-10-CM

## 2021-06-24 DIAGNOSIS — Z95.5 PRESENCE OF BARE METAL STENT IN LEFT CIRCUMFLEX CORONARY ARTERY: ICD-10-CM

## 2021-06-24 LAB — EKG IMPRESSION: NORMAL

## 2021-06-24 PROCEDURE — 99214 OFFICE O/P EST MOD 30 MIN: CPT | Performed by: NURSE PRACTITIONER

## 2021-06-24 PROCEDURE — 93000 ELECTROCARDIOGRAM COMPLETE: CPT | Performed by: INTERNAL MEDICINE

## 2021-06-24 RX ORDER — VITS A,C,E/LUTEIN/MINERALS 300MCG-200
1 TABLET ORAL DAILY
COMMUNITY
End: 2023-01-01

## 2021-06-24 ASSESSMENT — ENCOUNTER SYMPTOMS
SPUTUM PRODUCTION: 0
ORTHOPNEA: 0
HEMOPTYSIS: 0
PALPITATIONS: 0
SHORTNESS OF BREATH: 0
VOMITING: 0
NAUSEA: 0
WEAKNESS: 0
CLAUDICATION: 0
COUGH: 0
WHEEZING: 0
PND: 0
DIZZINESS: 0

## 2021-06-24 NOTE — PROGRESS NOTES
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     He is followed by Dr. 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     Last OV with Dr. Crawley 8/2020.     Patient called with concern, the last 10 days he has some mid sternum chest discomfort 2/10, relieved with belching. Not provoked with activity. Happens anytime of the day, not related with any food intake. He has not tried anything over the counter to relief the pain.      Past Medical History:   Diagnosis Date   • Benign essential HTN    • CAD (coronary artery disease) 04/09/1999    LCx STEMI   • Chronic kidney disease, stage III (moderate) ([facility])    • CKD (chronic kidney disease) stage 3, GFR 30-59 ml/min ([facility])    • Hypercholesterolemia    • Presence of bare metal stent in left circumflex coronary artery 04/09/1999    4 mm Duet   • Prostate cancer ([facility])    • PVD (peripheral vascular disease) ([facility])     Bilateral common iliac stenosis, left worse than right     Past Surgical History:   Procedure Laterality Date   • OTHER CARDIAC SURGERY      angioplasty and stent of the proximal circumflex coronary artery for acute MI     Family History   Problem Relation Age of Onset   • Heart Attack Father 60     Social History     Socioeconomic History   • Marital status:      Spouse name: Not on file   • Number of children: Not on file   • Years of education: Not on file   • Highest education level: Not on file   Occupational History   • Not on file   Tobacco Use   • Smoking status: Never Smoker   • Smokeless tobacco: Never Used   Substance and Sexual Activity   • Alcohol use: Yes     Alcohol/week: 1.8 - 2.4 oz     Types: 3 - 4 Standard drinks or equivalent per week     Comment: occ   • Drug use: No   • Sexual activity: Not on file   Other  Topics Concern   • Not on file   Social History Narrative    ** Merged History Encounter **          Social Determinants of Health     Financial Resource Strain:    • Difficulty of Paying Living Expenses:    Food Insecurity:    • Worried About Running Out of Food in the Last Year:    • Ran Out of Food in the Last Year:    Transportation Needs:    • Lack of Transportation (Medical):    • Lack of Transportation (Non-Medical):    Physical Activity:    • Days of Exercise per Week:    • Minutes of Exercise per Session:    Stress:    • Feeling of Stress :    Social Connections:    • Frequency of Communication with Friends and Family:    • Frequency of Social Gatherings with Friends and Family:    • Attends Protestant Services:    • Active Member of Clubs or Organizations:    • Attends Club or Organization Meetings:    • Marital Status:    Intimate Partner Violence:    • Fear of Current or Ex-Partner:    • Emotionally Abused:    • Physically Abused:    • Sexually Abused:      No Known Allergies  Outpatient Encounter Medications as of 6/24/2021   Medication Sig Dispense Refill   • Multiple Vitamins-Minerals (OCUVITE-LUTEIN) Tab Take 1 tablet by mouth every day.     • Misc Natural Products (GLUCOSAMINE CHOND CMP DOUBLE PO) Take  by mouth.     • losartan (COZAAR) 50 MG Tab TAKE ONE TABLET BY MOUTH ONCE DAILY 90 Tab 3   • hydroCHLOROthiazide (HYDRODIURIL) 25 MG Tab TAKE ONE TABLET BY MOUTH ONCE DAILY 90 Tab 3   • atorvastatin (LIPITOR) 20 MG Tab TAKE ONE TABLET BY MOUTH ONCE DAILY 90 Tab 3   • vitamin D (CHOLECALCIFEROL) 1000 UNIT Tab Take 1,000 Units by mouth 3 times a week. Tues, Thurs, Sat     • B Complex Vitamins (VITAMIN B COMPLEX) Tab Take 1 Tab by mouth every day.     • ascorbic acid (ASCORBIC ACID) 500 MG Tab Take 500 mg by mouth every day.     • aspirin EC (ECOTRIN) 81 MG TBEC Take 81 mg by mouth every day.     • [DISCONTINUED] LUTEIN Take 1 Tab by mouth every day. (Patient not taking: Reported on 6/24/2021)       No  "facility-administered encounter medications on file as of 6/24/2021.     Review of Systems   Constitutional: Negative for malaise/fatigue.   Respiratory: Negative for cough, hemoptysis, sputum production, shortness of breath and wheezing.    Cardiovascular: Negative for chest pain, palpitations, orthopnea, claudication, leg swelling and PND.        Mid sternum chest discomfort 2/10   Gastrointestinal: Negative for nausea and vomiting.   Neurological: Negative for dizziness and weakness.        Objective:   /54 (BP Location: Left arm, Patient Position: Sitting)   Pulse 62   Ht 1.702 m (5' 7\")   Wt 70.3 kg (155 lb)   SpO2 96%   BMI 24.28 kg/m²     Physical Exam   Constitutional: He is oriented to person, place, and time. He appears well-developed. No distress.   HENT:   Head: Normocephalic and atraumatic.   Eyes: Pupils are equal, round, and reactive to light.   Neck: No JVD present.   Cardiovascular: Normal rate, regular rhythm and normal heart sounds.   Pulmonary/Chest: Effort normal and breath sounds normal.   Abdominal: Soft. Bowel sounds are normal. He exhibits no distension.   Neurological: He is alert and oriented to person, place, and time.   Skin: Skin is warm and dry. He is not diaphoretic.   Psychiatric: His behavior is normal. Judgment and thought content normal.       Echo dated 8/2017:   CONCLUSIONS  Severe hypertension.  Left ventricular ejection fraction is visually estimated to be 70%.  Normal left atrial size.  No significant valve disease or flow abnormalities.   Right ventricular systolic pressure is estimated to be 30 mmHg.  Compared to the images of the prior study done 2015 -  there has been   no significant change.         Nuclear Perfusion Imaging (9/2017):   Myocardial Perfusion   Report   NUCLEAR IMAGING INTERPRETATION   Fixed inferolateral perfusion defect at rest and with stress consistent with    infarction (SSS 6, SDS 0).   Inferolateral hypokinesis. LV ejection fraction = " 60%.   ECG INTERPRETATION   Negative stress ECG for ischemia.     STRESS TEST      Exercise   Protoc   Calderon          Duration       05:01    METS:   7.0   ol:                     (m:s):        [facility] (1999): I have the images he brought for review.  He had a STEMI in the proximal Lcx.     Assessment:     1. Atypical chest pain  EKG - Clinic Performed    DX-ESOPH. FLUORO (BARIUM SWALLOW)    CANCELED: US-HERNIA ABDOMEN   2. Atherosclerosis of native coronary artery of native heart with stable angina pectoris (HCC)     3. Presence of bare metal stent in left circumflex coronary artery, 4mm Duet, 4/9/99         Medical Decision Making:  Today's Assessment / Status / Plan:     1. CAD with prior PCI:  - last MPI in 2017 showed fixed inferolataral perfusion defect.   - continue asa, atorvastatin 20mg qd  - ECHO showed ef 70%    2. Atypical chest discomfort:  - EKG today showed NSR   - he believes it is possible hiatal hernia. We will order barium swallow to evaluate for hernia.   - recommend he tries omeprazole over the counter to relief the pain.     3. Hypertension:  - stable   - continue losartan 50mg qd and hctz 25mg qd     Follow up in 3 months with Dr. Crawley, sooner as needed.

## 2021-10-05 ENCOUNTER — TELEPHONE (OUTPATIENT)
Dept: CARDIOLOGY | Facility: MEDICAL CENTER | Age: 86
End: 2021-10-05

## 2021-10-05 NOTE — TELEPHONE ENCOUNTER
Pt. Has FV 10-12-21 with [facility]. His pharmacy has requested a refill of Potassium 8mEq 1 tab daily. No trecords of refills on this med sonce 2019. Pt's. June CMP showed Potassium=4.7.   Pt. Said he has enough supply to last until next week's appointment. He will discuss further at that time.

## 2021-11-10 DIAGNOSIS — I10 ESSENTIAL HYPERTENSION: ICD-10-CM

## 2021-11-11 DIAGNOSIS — E78.5 HYPERLIPIDEMIA, UNSPECIFIED HYPERLIPIDEMIA TYPE: ICD-10-CM

## 2021-11-11 DIAGNOSIS — I11.9 BENIGN HYPERTENSIVE HEART DISEASE WITHOUT HEART FAILURE: ICD-10-CM

## 2021-11-12 RX ORDER — HYDROCHLOROTHIAZIDE 25 MG/1
25 TABLET ORAL DAILY
Qty: 90 TABLET | Refills: 3 | Status: SHIPPED | OUTPATIENT
Start: 2021-11-12 | End: 2023-01-01

## 2021-11-12 RX ORDER — ATORVASTATIN CALCIUM 20 MG/1
20 TABLET, FILM COATED ORAL DAILY
Qty: 90 TABLET | Refills: 3 | Status: SHIPPED | OUTPATIENT
Start: 2021-11-12 | End: 2023-01-01

## 2022-01-01 ENCOUNTER — PATIENT MESSAGE (OUTPATIENT)
Dept: HEALTH INFORMATION MANAGEMENT | Facility: OTHER | Age: 87
End: 2022-01-01

## 2022-01-01 DIAGNOSIS — I10 ESSENTIAL HYPERTENSION: ICD-10-CM

## 2022-01-01 RX ORDER — LOSARTAN POTASSIUM 50 MG/1
TABLET ORAL
Qty: 90 TABLET | Refills: 0 | Status: SHIPPED
Start: 2022-01-01 | End: 2023-01-01

## 2022-01-05 DIAGNOSIS — I10 ESSENTIAL HYPERTENSION: ICD-10-CM

## 2022-01-06 ENCOUNTER — OFFICE VISIT (OUTPATIENT)
Dept: CARDIOLOGY | Facility: MEDICAL CENTER | Age: 87
End: 2022-01-06
Payer: MEDICARE

## 2022-01-06 ENCOUNTER — TELEPHONE (OUTPATIENT)
Dept: CARDIOLOGY | Facility: MEDICAL CENTER | Age: 87
End: 2022-01-06

## 2022-01-06 VITALS
HEART RATE: 76 BPM | BODY MASS INDEX: 23.64 KG/M2 | RESPIRATION RATE: 14 BRPM | SYSTOLIC BLOOD PRESSURE: 130 MMHG | OXYGEN SATURATION: 96 % | WEIGHT: 150.6 LBS | HEIGHT: 67 IN | DIASTOLIC BLOOD PRESSURE: 70 MMHG

## 2022-01-06 DIAGNOSIS — N18.30 STAGE 3 CHRONIC KIDNEY DISEASE, UNSPECIFIED WHETHER STAGE 3A OR 3B CKD: ICD-10-CM

## 2022-01-06 DIAGNOSIS — Z95.5 PRESENCE OF BARE METAL STENT IN LEFT CIRCUMFLEX CORONARY ARTERY: ICD-10-CM

## 2022-01-06 DIAGNOSIS — I73.9 PVD (PERIPHERAL VASCULAR DISEASE) (HCC): ICD-10-CM

## 2022-01-06 DIAGNOSIS — R00.2 PALPITATIONS: ICD-10-CM

## 2022-01-06 DIAGNOSIS — I65.01 OCCLUSION OF RIGHT VERTEBRAL ARTERY: ICD-10-CM

## 2022-01-06 PROCEDURE — 99214 OFFICE O/P EST MOD 30 MIN: CPT | Performed by: INTERNAL MEDICINE

## 2022-01-06 RX ORDER — LOSARTAN POTASSIUM 50 MG/1
TABLET ORAL
Qty: 90 TABLET | Refills: 2 | Status: SHIPPED | OUTPATIENT
Start: 2022-01-06 | End: 2022-01-01

## 2022-01-06 NOTE — TELEPHONE ENCOUNTER
Labs [facility] [phone]. Labs received and sent to scan.

## 2022-01-06 NOTE — PROGRESS NOTES
Cardiology Follow-up Consultation Note    Date of note: 1/6/2022  Primary Care Provider: Pcp Pt States None  Referring Provider: Luis Antonio Lugo M.D.     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    At our visit, 4/9/2018:  Stress test negative in September.  Was ordered for angina no recurrence.    In terms of his hypertension, his SBP at home is 140s-170s.      Does have some worsening DONOVAN on [place].      In terms of CKD, last creatinine slightly higher has stopped NSAIDS.    At our visit, 12/20/2018:  Had an episode of ataxia in September for 1 week.     In terms of hypertension, poorly controlled in the 140s-150s.     At our visit,   8/22/2019:  In terms of hypertension, BP ranges in the 120s-150s. Sometimes skipping losartan and hctz as does not feel well in the 120s. Not taking potassium, but electrolytes well controlled with dates.     At our visit, 8/26/2020:  Had transient double vision. Work-up from MRI showed nothing as a clear cause. No recurrence.      Had acute hip pain, diagnosed with lumbar radiculopathy.     Interim Events:  Had some chest discomfort that improved with belching. Improved after taking gavascon.     Walking for exercise, stable ataxia.     In terms of CKD, stable.     In terms of CAD, no angina.     No more double vision.     BP remains very sensitive to sodium. At home typically 130s-140s.     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     ROS  Constitution: Negative for chills, fever and night sweats.   HENT: Negative for nosebleeds.    Eyes: Negative for vision loss in left eye and vision loss in right eye.   Respiratory: Negative for hemoptysis.     Gastrointestinal: Negative for hematemesis, hematochezia and melena.   Genitourinary: Negative for hematuria.   Neurological: Negative for focal weakness, numbness and paresthesias.     All other systems reviewed and discussed using a comprehensive questionnaire and are negative.         Past Medical History:   Diagnosis Date   • Benign essential HTN    • CAD (coronary artery disease) 04/09/1999    LCx STEMI   • Chronic kidney disease, stage III (moderate) ([facility])    • CKD (chronic kidney disease) stage 3, GFR 30-59 ml/min ([facility])    • Hypercholesterolemia    • Presence of bare metal stent in left circumflex coronary artery 04/09/1999    4 mm Duet   • Prostate cancer ([facility])    • PVD (peripheral vascular disease) ([facility])     Bilateral common iliac stenosis, left worse than right         Past Surgical History:   Procedure Laterality Date   • OTHER CARDIAC SURGERY      angioplasty and stent of the proximal circumflex coronary artery for acute MI         Current Outpatient Medications   Medication Sig Dispense Refill   • hydroCHLOROthiazide (HYDRODIURIL) 25 MG Tab Take 1 Tablet by mouth every day. 90 Tablet 3   • atorvastatin (LIPITOR) 20 MG Tab Take 1 Tablet by mouth every day. 90 Tablet 3   • Multiple Vitamins-Minerals (OCUVITE-LUTEIN) Tab Take 1 tablet by mouth every day.     • Misc Natural Products (GLUCOSAMINE CHOND CMP DOUBLE PO) Take  by mouth.     • vitamin D (CHOLECALCIFEROL) 1000 UNIT Tab Take 1,000 Units by mouth 3 times a week. Tues, Thurs, Sat     • B Complex Vitamins (VITAMIN B COMPLEX) Tab Take 1 Tab by mouth every day.     • ascorbic acid (ASCORBIC ACID) 500 MG Tab Take 500 mg by mouth every day.     • aspirin EC (ECOTRIN) 81 MG TBEC Take 81 mg by mouth every day.     • losartan (COZAAR) 50 MG Tab TAKE ONE TABLET BY MOUTH ONCE DAILY 90 Tablet 2     No current facility-administered medications for this visit.         No Known Allergies      Family History   Problem Relation Age of Onset   • Heart Attack  Father 60         Social History     Socioeconomic History   • Marital status:      Spouse name: Not on file   • Number of children: Not on file   • Years of education: Not on file   • Highest education level: Not on file   Occupational History   • Not on file   Tobacco Use   • Smoking status: Never Smoker   • Smokeless tobacco: Never Used   Substance and Sexual Activity   • Alcohol use: Yes     Alcohol/week: 1.8 - 2.4 oz     Types: 3 - 4 Standard drinks or equivalent per week     Comment: occ   • Drug use: No   • Sexual activity: Not on file   Other Topics Concern   • Not on file   Social History Narrative    ** Merged History Encounter **          Social Determinants of Health     Financial Resource Strain:    • Difficulty of Paying Living Expenses: Not on file   Food Insecurity:    • Worried About Running Out of Food in the Last Year: Not on file   • Ran Out of Food in the Last Year: Not on file   Transportation Needs:    • Lack of Transportation (Medical): Not on file   • Lack of Transportation (Non-Medical): Not on file   Physical Activity:    • Days of Exercise per Week: Not on file   • Minutes of Exercise per Session: Not on file   Stress:    • Feeling of Stress : Not on file   Social Connections:    • Frequency of Communication with Friends and Family: Not on file   • Frequency of Social Gatherings with Friends and Family: Not on file   • Attends Buddhism Services: Not on file   • Active Member of Clubs or Organizations: Not on file   • Attends Club or Organization Meetings: Not on file   • Marital Status: Not on file   Intimate Partner Violence:    • Fear of Current or Ex-Partner: Not on file   • Emotionally Abused: Not on file   • Physically Abused: Not on file   • Sexually Abused: Not on file   Housing Stability:    • Unable to Pay for Housing in the Last Year: Not on file   • Number of Places Lived in the Last Year: Not on file   • Unstable Housing in the Last Year: Not on file         Physical  "Exam:  Ambulatory Vitals  /70 (BP Location: Left arm, Patient Position: Sitting, BP Cuff Size: Adult)   Pulse 76   Resp 14   Ht 1.702 m (5' 7\")   Wt 68.3 kg (150 lb 9.6 oz)   SpO2 96%    Oxygen Therapy:  Pulse Oximetry: 96 %  BP Readings from Last 4 Encounters:   01/06/22 130/70   06/24/21 140/54   12/13/20 158/66   08/26/20 156/60       Weight/BMI: Body mass index is 23.59 kg/m².  Wt Readings from Last 4 Encounters:   01/06/22 68.3 kg (150 lb 9.6 oz)   08/06/21 72.6 kg (160 lb)   06/24/21 70.3 kg (155 lb)   12/13/20 74.4 kg (164 lb)       General: No apparent distress  Eyes: nl conjunctiva  ENT: OP covered by mask  Neck: JVP <8, no carotid bruits  Lungs: normal respiratory effort, CTAB  Heart: RRR,  1/6 systolic murmur, no rubs or gallops, trace edema bilateral lower extremities. No LV/RV heave on cardiac palpatation. 2+ bilateral radial pulses.   Abdomen: soft, non tender, non distended, no masses, normal bowel sounds.  No HSM.  Extremities/MSK: no clubbing, no cyanosis  Neurological: No focal sensory deficits  Psychiatric: Appropriate affect, A/O x 3, intact judgement and insight  Skin: Warm extremities    Exam repeated in full and unchanged except for as noted above.      Lab Data Review:  Lab Results   Component Value Date/Time    CHOLSTRLTOT 134 08/16/2019 07:13 AM    CHOLSTRLTOT 114 08/17/2017 01:50 AM    LDL 64 08/16/2019 07:13 AM    LDL 53 08/17/2017 01:50 AM    HDL 50 08/16/2019 07:13 AM    HDL 48 08/17/2017 01:50 AM    TRIGLYCERIDE 102 08/16/2019 07:13 AM    TRIGLYCERIDE 67 08/17/2017 01:50 AM       Lab Results   Component Value Date/Time    SODIUM 143 08/16/2019 07:13 AM    SODIUM 137 08/17/2017 01:50 AM    POTASSIUM 4.5 08/16/2019 07:13 AM    POTASSIUM 4.4 08/17/2017 01:50 AM    CHLORIDE 107 (H) 08/16/2019 07:13 AM    CHLORIDE 110 08/17/2017 01:50 AM    CO2 21 08/16/2019 07:13 AM    CO2 20 08/17/2017 01:50 AM    GLUCOSE 95 08/16/2019 07:13 AM    GLUCOSE 96 08/17/2017 01:50 AM    BUN 41 (H) " 08/16/2019 07:13 AM    BUN 37 (H) 08/17/2017 01:50 AM    CREATININE 1.92 (H) 08/16/2019 07:13 AM    CREATININE 1.69 (H) 08/17/2017 01:50 AM    BUNCREATRAT 21 08/16/2019 07:13 AM     Lab Results   Component Value Date/Time    ALKPHOSPHAT 71 08/16/2019 07:13 AM    ALKPHOSPHAT 53 08/16/2017 07:41 AM    ASTSGOT 22 08/16/2019 07:13 AM    ASTSGOT 18 08/16/2017 07:41 AM    ALTSGPT 16 08/16/2019 07:13 AM    ALTSGPT 11 08/16/2017 07:41 AM    TBILIRUBIN 0.5 08/16/2019 07:13 AM    TBILIRUBIN 0.9 08/16/2017 07:41 AM      Lab Results   Component Value Date/Time    WBC 6.2 08/16/2019 07:13 AM    WBC 6.7 08/17/2017 01:50 AM     No components found for: HBGA1C  No components found for: TROPONIN  No results found for: BNP    [facility] labs (2/2018)  Creatinine 1.9  BUN 37  Potassium 4.4  Sodium 142  LFTs WNL  eGFR 30    Baseline creatinine 1.7      [facility] labs 7/2020  hgb 13.8  plt 142  Bun 39  Creatinine 2.2  eGFR 26  Potassium 4.5  Sodium 142  LFTs WNL    2/2020  Cholesterol panel looks great.       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 The RBBB is new from previous EKG.     Echo dated 8/2017:   CONCLUSIONS  Severe hypertension.  Left ventricular ejection fraction is visually estimated to be 70%.  Normal left atrial size.  No significant valve disease or flow abnormalities.   Right ventricular systolic pressure is estimated to be 30 mmHg.  Compared to the images of the prior study done 2015 -  there has been   no significant change.       Nuclear Perfusion Imaging (9/2017):   Myocardial Perfusion   Report   NUCLEAR IMAGING INTERPRETATION   Fixed inferolateral perfusion defect at rest and with stress consistent with    infarction (SSS 6, SDS 0).   Inferolateral hypokinesis. LV ejection fraction = 60%.   ECG INTERPRETATION   Negative stress ECG for ischemia.    STRESS TEST      Exercise   Protoc   Calderon          Duration       05:01    METS:   7.0   ol:                      (m:s):      [facility] (): I have the images he brought for review.  He had a STEMI in the proximal Lcx.     Radiology test Review:  MRI 2017:  1.  Focal high-grade stenosis or occlusion in the distal intracranial RIGHT vertebral artery with slow flow in a small caliber RIGHT vertebral artery  2.  Patent LEFT vertebral artery  3.  No evidence of hemodynamically significant stenosis in either internal carotid artery    1.  Focal high-grade stenosis or occlusion in the distal intracranial RIGHT vertebral artery with slow flow in a small caliber RIGHT vertebral artery  2.  RIGHT PCA arises from the RIGHT ICA    1.  Occlusion of or slow flow within the visualized distal RIGHT vertebral artery  2.  MRI of the brain without contrast otherwise within normal limits for age with atrophy and white matter changes.  3.  Chronic-appearing RIGHT sphenoid sinus disease      Brain MRI 2020:  IMPRESSION:     1.  The left cavernous sinuses is slightly asymmetric and demonstrates isointense T2 signal along its lateral wall. This is unchanged since the previous study. This may be a normal variant. However in view of left sixth opacity the possibility of   cavernous sinus lesion cannot be excluded. Postcontrast study is recommended.  2.  Moderate age-appropriate cerebral atrophy.  3.  Small cortical infarct in the left frontal lobe.  4.  Moderate chronic microvascular ischemic disease.  5.  Opacification of the right side of the sphenoid sinus.    Medical Decision Makin. Essential hypertension, benign  poorly controlled. 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  I believe his previous DONOVAN is likely secondary to hypertension, worsening diastolic function causing pulmonary edema, and this does seem to have improved on higher hctz dose.  -cont hctz 25mg PO Daily.   -continue losartan 50mg PO daily  -patient previously adamant about not starting  further BP medications at this time. He states his BP at home is typically in the 130s.     2. Atherosclerosis of native coronary artery of native heart with stable angina pectoris (CMS-HCC)  Asymptomatic.  Small scar on last stress perfusion imaging, no ischemia  -continue aspirin/lipitor, cholesterol at goal.     3. CKD (chronic kidney disease) stage 3, GFR 30-59 ml/min  Avoid NSAIDS. Has one functioning kidney  -followed by nephrology.     4. PVD (peripheral vascular disease) (CMS-[facility])  Limits exercise to around 100 yards. Bilateral iliac disease, no previous intervention  -continue aspirin/lipitor    5. Ataxia  ? Vertebrobasilar insuffiency, known occlusion of right veterbral artery vs occasional cord compression. Not interested in spinal surgery at this time.   -f/u with Dr. Mccoy, continue aspirin, lipitor, will not aggressively lower Bp.     6. Thrombocytopenia - stable, further work-up if falls below 100.     7. Diplopia - possible cranial nerve spasm or deficit, resolved.  -CTM. Consider switching to plavix if recurs.   -request notes from Dr. Cobb.     8. Goals of care - Previously discussed prognosis, current ability, and goals of care. We did confirm he wanted to be DNR/DNI and filled out a POLST stating such. He also stated he did not want artificial nutrition. We also gave him a healthcare advanced directive.     Request [facility] labs.           Return in about 1 year (around 1/6/2023).      Shaun Crawley MD  [facility]  [facility], [address]  [address] [address]  Phone: [phone]  Fax: [phone]

## 2022-01-14 ENCOUNTER — TELEPHONE (OUTPATIENT)
Dept: CARDIOLOGY | Facility: MEDICAL CENTER | Age: 87
End: 2022-01-14

## 2022-01-15 NOTE — TELEPHONE ENCOUNTER
Received request from pharmacy stating losartan is unavailable can we change to something else.     Routed to JM for recommendations.

## 2022-02-01 NOTE — TELEPHONE ENCOUNTER
Spoke to JM and he stated to start Urbesartan 150mg daily in place of losartan.     Called patient home number and spoke to the patient. He stated he picked up a month supply due to the shortage. Advised we will change his medication and he state so far it has not been an issue. Advised to call us if he is unable to  the medication and he stated he would. Answered all questions and concerns, appreciative of call.     Did not change medication at this time, patient stated it is not an issue yet, he will callback if he is unable to get medication.

## 2022-05-13 NOTE — H&P
Hospital Medicine History and Physical    Date of Service  8/16/2017    Chief Complaint  Chief Complaint   Patient presents with   • Gait Problem       History of Presenting Illness  89 y.o. male who presented 8/16/2017 with new onset ataxia noted this morning when he woke up. He he is a retired general medicine physician and reports that he had a TIA about 1 year ago with right-sided weakness, he was seen by a neurologist and his workup revealed that he had slow flow in his right vertebral artery. This morning when he woke up he noticed that his gait was ataxic, he did not have any focal weakness, no changes in vision or speech, no headache, no vertigo. Given his history got concerned about this representing stroke and decided to present to the emergency room. He has been maintained on aspirin and atorvastatin. He denies any falls or injuries. No loss of consciousness. He has a known history of carotid artery disease and follows up with Dr. Lugo. He also has peripheral vascular disease with buttock claudication. She also has a history of chronic kidney disease with nonfunctioning left kidney following surgery for prostate cancer. He lives independently and ambulates usually without any assistive  devices   Primary Care Physician  Pcp Pt States None    Consultants  none    Code Status  Discussed with patient and his wishes are to be DNR    Review of Systems  ROS    As above otherwise reviewed and negative    Past Medical History  Past Medical History   Diagnosis Date   • Benign hypertensive heart disease without heart failure    • Coronary atherosclerosis of native coronary artery    • Chronic kidney disease, stage III (moderate)    • Palpitations    • Peripheral vascular disease, unspecified (CMS-HCC)    • Malignant neoplasm of prostate (CMS-HCC)    • Stented coronary artery 6/1/2012   • Benign essential HTN 6/1/2012   • CAD (coronary artery disease) 6/1/2012   • CKD (chronic kidney disease) stage 3, GFR  30-59 ml/min 2012   • Hypercholesterolemia 2012   • Palpitations 2012   • PVD (peripheral vascular disease) (CMS-HCC) 2012   • Prostate cancer (CMS-HCC) 2012       Surgical History  Past Surgical History   Procedure Laterality Date   • Other cardiac surgery       angioplasty and stent of the proximal circumflex coronary artery for acute MI       Medications  No current facility-administered medications on file prior to encounter.     Current Outpatient Prescriptions on File Prior to Encounter   Medication Sig Dispense Refill   • losartan (COZAAR) 50 MG Tab Take 1 Tab by mouth every day. 90 Tab 1   • hydrochlorothiazide (HYDRODIURIL) 12.5 MG tablet Take 1 Tab by mouth every day. 90 Tab 3   • atorvastatin (LIPITOR) 20 MG Tab TAKE 1 TABLET BY MOUTH EVERY  Tab 3   • LUTEIN Take 1 Tab by mouth every day.     • aspirin EC (ECOTRIN) 81 MG TBEC Take 81 mg by mouth every day.         Family History  Family History   Problem Relation Age of Onset   • Heart Attack Father 60       Social History  Social History   Substance Use Topics   • Smoking status: Never Smoker    • Smokeless tobacco: Never Used   • Alcohol Use: Yes       Allergies  No Known Allergies     Physical Exam  Laboratory   Hemodynamics  Temp (24hrs), Av.7 °C (98.1 °F), Min:36.7 °C (98.1 °F), Max:36.7 °C (98.1 °F)   Temperature: 36.7 °C (98.1 °F)  Pulse  Av  Min: 54  Max: 60 Heart Rate (Monitored): (!) 59  Blood Pressure : 155/61 mmHg      Respiratory      Respiration: 13, Pulse Oximetry: 97 %             Physical Exam   Constitutional: He is oriented to person, place, and time. He appears well-developed.   HENT:   Head: Normocephalic and atraumatic.   Mouth/Throat: Oropharynx is clear and moist. No oropharyngeal exudate.   Eyes: Conjunctivae are normal. Pupils are equal, round, and reactive to light. Right eye exhibits no discharge. Left eye exhibits no discharge. No scleral icterus.   Neck: No JVD present. No tracheal  deviation present.   Cardiovascular: Regular rhythm.  Exam reveals no gallop and no friction rub.    Murmur heard.  Bradycardia   Pulmonary/Chest: Effort normal and breath sounds normal. No stridor. No respiratory distress. He has no wheezes. He exhibits no tenderness.   Abdominal: Soft. Bowel sounds are normal. He exhibits no distension. There is no tenderness. There is no rebound.   Musculoskeletal: He exhibits no edema or tenderness.   Neurological: He is alert and oriented to person, place, and time. No cranial nerve deficit. He exhibits normal muscle tone.   Skin: Skin is warm and dry. He is not diaphoretic. No cyanosis. Nails show no clubbing.   Psychiatric: He has a normal mood and affect. His behavior is normal.   Nursing note and vitals reviewed.      Recent Labs      08/16/17   0741   WBC  5.1   RBC  4.15*   HEMOGLOBIN  13.9*   HEMATOCRIT  40.7*   MCV  98.1*   MCH  33.5*   MCHC  34.2   RDW  45.9   PLATELETCT  101*   MPV  12.0     Recent Labs      08/16/17   0741   SODIUM  141   POTASSIUM  3.9   CHLORIDE  109   CO2  22   GLUCOSE  88   BUN  36*   CREATININE  1.58*   CALCIUM  9.0     Recent Labs      08/16/17   0741   ALTSGPT  11   ASTSGOT  18   ALKPHOSPHAT  53   TBILIRUBIN  0.9   GLUCOSE  88     Recent Labs      08/16/17   0741   APTT  24.2*   INR  1.00             Lab Results   Component Value Date    TROPONINI 0.02 08/16/2017    TROPONINI 0.01 02/05/2015       Imaging  CT had revealed   .  Diffuse atrophy and white matter changes.  2.  No acute intracranial hemorrhage or territorial infarct.  3.  New complete opacification of RIGHT sphenoid sinus, likely chronic.  4.  Mild chronic inferior frontal and ethmoid sinus disease    Chest x-ray reveals scattered linear atelectasis    Assessment/Plan     I anticipate this patient is appropriate for observation status at this time.    * Ataxia  Assessment & Plan  Concern for CNS  etiology given his history and risk factors  Continue aspirin and atorvastatin and  losartan  Will check MRI of the brain and MRA of the intra-and extracranial vessels and echocardiogram  PT evaluation  Discussed with patient changing aspirin to Plavix he is reluctant at this time and wants to reevaluate after MRI results  We'll hold hydrochlorothiazide to avoid hypotension    Coronary atherosclerosis of native coronary artery (present on admission)  Assessment & Plan  Continue current medical therapy  He was taken off beta blocker secondary to bradycardia    Hypercholesterolemia (present on admission)  Assessment & Plan  Continue Lipitor check lipid panel    PVD (peripheral vascular disease) (CMS-HCC) (present on admission)  Assessment & Plan  Continue aspirin and Lipitor        VTE prophylaxis: SCD .         No

## 2022-11-23 NOTE — TELEPHONE ENCOUNTER
Is the patient due for a refill? Yes    Was the patient seen the past year? Yes    Date of last office visit: 1/6/2022    Does the patient have an upcoming appointment?  No   If yes, When?     Provider to refill:THOMAS    Does the patients insurance require a 100 day supply?  No

## 2023-01-01 ENCOUNTER — HOSPITAL ENCOUNTER (INPATIENT)
Facility: MEDICAL CENTER | Age: 88
LOS: 6 days | DRG: 565 | End: 2023-05-03
Attending: EMERGENCY MEDICINE | Admitting: FAMILY MEDICINE
Payer: MEDICARE

## 2023-01-01 ENCOUNTER — APPOINTMENT (OUTPATIENT)
Dept: RADIOLOGY | Facility: MEDICAL CENTER | Age: 88
DRG: 565 | End: 2023-01-01
Attending: EMERGENCY MEDICINE
Payer: MEDICARE

## 2023-01-01 ENCOUNTER — HOSPICE ADMISSION (OUTPATIENT)
Dept: HOSPICE | Facility: HOSPICE | Age: 88
End: 2023-01-01
Payer: MEDICARE

## 2023-01-01 ENCOUNTER — HOME CARE VISIT (OUTPATIENT)
Dept: HOSPICE | Facility: HOSPICE | Age: 88
End: 2023-01-01
Payer: MEDICARE

## 2023-01-01 ENCOUNTER — APPOINTMENT (OUTPATIENT)
Dept: RADIOLOGY | Facility: MEDICAL CENTER | Age: 88
DRG: 565 | End: 2023-01-01
Attending: STUDENT IN AN ORGANIZED HEALTH CARE EDUCATION/TRAINING PROGRAM
Payer: MEDICARE

## 2023-01-01 ENCOUNTER — APPOINTMENT (OUTPATIENT)
Dept: RADIOLOGY | Facility: MEDICAL CENTER | Age: 88
DRG: 565 | End: 2023-01-01
Payer: MEDICARE

## 2023-01-01 ENCOUNTER — APPOINTMENT (OUTPATIENT)
Dept: CARDIOLOGY | Facility: MEDICAL CENTER | Age: 88
End: 2023-01-01
Payer: MEDICARE

## 2023-01-01 VITALS
OXYGEN SATURATION: 85 % | SYSTOLIC BLOOD PRESSURE: 93 MMHG | RESPIRATION RATE: 11 BRPM | BODY MASS INDEX: 23.05 KG/M2 | DIASTOLIC BLOOD PRESSURE: 30 MMHG | TEMPERATURE: 98.6 F | HEIGHT: 68 IN | WEIGHT: 152.12 LBS | HEART RATE: 97 BPM

## 2023-01-01 DIAGNOSIS — I48.91 ATRIAL FIBRILLATION, UNSPECIFIED TYPE (HCC): ICD-10-CM

## 2023-01-01 DIAGNOSIS — S42.032A CLOSED DISPLACED FRACTURE OF ACROMIAL END OF LEFT CLAVICLE, INITIAL ENCOUNTER: ICD-10-CM
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
## 2023-01-01 DIAGNOSIS — S01.01XA SCALP LACERATION, INITIAL ENCOUNTER: ICD-10-CM

## 2023-01-01 DIAGNOSIS — I11.9 BENIGN HYPERTENSIVE HEART DISEASE WITHOUT HEART FAILURE: ICD-10-CM

## 2023-01-01 DIAGNOSIS — I45.10 RIGHT BUNDLE BRANCH BLOCK: ICD-10-CM

## 2023-01-01 DIAGNOSIS — S05.12XA PERIORBITAL CONTUSION OF LEFT EYE, INITIAL ENCOUNTER: ICD-10-CM

## 2023-01-01 DIAGNOSIS — I10 ESSENTIAL HYPERTENSION: ICD-10-CM

## 2023-01-01 DIAGNOSIS — S09.90XA CLOSED HEAD INJURY, INITIAL ENCOUNTER: ICD-10-CM

## 2023-01-01 DIAGNOSIS — E78.5 HYPERLIPIDEMIA, UNSPECIFIED HYPERLIPIDEMIA TYPE: ICD-10-CM

## 2023-01-01 DIAGNOSIS — T79.6XXA TRAUMATIC RHABDOMYOLYSIS, INITIAL ENCOUNTER (HCC): ICD-10-CM

## 2023-01-01 DIAGNOSIS — I25.118 ATHEROSCLEROSIS OF NATIVE CORONARY ARTERY OF NATIVE HEART WITH STABLE ANGINA PECTORIS (HCC): ICD-10-CM

## 2023-01-01 LAB
ALBUMIN SERPL BCP-MCNC: 2.4 G/DL (ref 3.2–4.9)
ALBUMIN SERPL BCP-MCNC: 2.4 G/DL (ref 3.2–4.9)
ALBUMIN SERPL BCP-MCNC: 2.6 G/DL (ref 3.2–4.9)
ALBUMIN SERPL BCP-MCNC: 3 G/DL (ref 3.2–4.9)
ALBUMIN SERPL BCP-MCNC: 3.1 G/DL (ref 3.2–4.9)
ALBUMIN SERPL BCP-MCNC: 3.2 G/DL (ref 3.2–4.9)
ALBUMIN/GLOB SERPL: 0.9 G/DL
ALBUMIN/GLOB SERPL: 1 G/DL
ALBUMIN/GLOB SERPL: 1 G/DL
ALBUMIN/GLOB SERPL: 1.2 G/DL
ALBUMIN/GLOB SERPL: 1.3 G/DL
ALBUMIN/GLOB SERPL: 1.3 G/DL
ALP SERPL-CCNC: 54 U/L (ref 30–99)
ALP SERPL-CCNC: 58 U/L (ref 30–99)
ALP SERPL-CCNC: 60 U/L (ref 30–99)
ALP SERPL-CCNC: 61 U/L (ref 30–99)
ALP SERPL-CCNC: 62 U/L (ref 30–99)
ALP SERPL-CCNC: 63 U/L (ref 30–99)
ALT SERPL-CCNC: 17 U/L (ref 2–50)
ALT SERPL-CCNC: 21 U/L (ref 2–50)
ALT SERPL-CCNC: 27 U/L (ref 2–50)
ALT SERPL-CCNC: 31 U/L (ref 2–50)
ALT SERPL-CCNC: 32 U/L (ref 2–50)
ALT SERPL-CCNC: 34 U/L (ref 2–50)
ANION GAP SERPL CALC-SCNC: 11 MMOL/L (ref 7–16)
ANION GAP SERPL CALC-SCNC: 11 MMOL/L (ref 7–16)
ANION GAP SERPL CALC-SCNC: 12 MMOL/L (ref 7–16)
ANION GAP SERPL CALC-SCNC: 13 MMOL/L (ref 7–16)
ANION GAP SERPL CALC-SCNC: 14 MMOL/L (ref 7–16)
ANION GAP SERPL CALC-SCNC: 16 MMOL/L (ref 7–16)
APPEARANCE UR: ABNORMAL
APPEARANCE UR: CLEAR
APTT PPP: 25.4 SEC (ref 24.7–36)
AST SERPL-CCNC: 40 U/L (ref 12–45)
AST SERPL-CCNC: 63 U/L (ref 12–45)
AST SERPL-CCNC: 65 U/L (ref 12–45)
AST SERPL-CCNC: 71 U/L (ref 12–45)
AST SERPL-CCNC: 73 U/L (ref 12–45)
AST SERPL-CCNC: 77 U/L (ref 12–45)
BACTERIA #/AREA URNS HPF: NEGATIVE /HPF
BACTERIA #/AREA URNS HPF: NEGATIVE /HPF
BASE EXCESS BLDA CALC-SCNC: -6 MMOL/L (ref -4–3)
BASOPHILS # BLD AUTO: 0.1 % (ref 0–1.8)
BASOPHILS # BLD AUTO: 0.2 % (ref 0–1.8)
BASOPHILS # BLD AUTO: 0.4 % (ref 0–1.8)
BASOPHILS # BLD: 0.01 K/UL (ref 0–0.12)
BASOPHILS # BLD: 0.02 K/UL (ref 0–0.12)
BASOPHILS # BLD: 0.03 K/UL (ref 0–0.12)
BILIRUB SERPL-MCNC: 0.5 MG/DL (ref 0.1–1.5)
BILIRUB SERPL-MCNC: 0.6 MG/DL (ref 0.1–1.5)
BILIRUB SERPL-MCNC: 0.6 MG/DL (ref 0.1–1.5)
BILIRUB SERPL-MCNC: 0.7 MG/DL (ref 0.1–1.5)
BILIRUB SERPL-MCNC: 0.8 MG/DL (ref 0.1–1.5)
BILIRUB SERPL-MCNC: 1.2 MG/DL (ref 0.1–1.5)
BILIRUB UR QL STRIP.AUTO: NEGATIVE
BILIRUB UR QL STRIP.AUTO: NEGATIVE
BODY TEMPERATURE: 36.5 CENTIGRADE
BUN SERPL-MCNC: 42 MG/DL (ref 8–22)
BUN SERPL-MCNC: 46 MG/DL (ref 8–22)
BUN SERPL-MCNC: 47 MG/DL (ref 8–22)
BUN SERPL-MCNC: 47 MG/DL (ref 8–22)
BUN SERPL-MCNC: 51 MG/DL (ref 8–22)
BUN SERPL-MCNC: 54 MG/DL (ref 8–22)
BUN SERPL-MCNC: 55 MG/DL (ref 8–22)
BUN SERPL-MCNC: 57 MG/DL (ref 8–22)
CALCIUM ALBUM COR SERPL-MCNC: 8.7 MG/DL (ref 8.5–10.5)
CALCIUM ALBUM COR SERPL-MCNC: 8.9 MG/DL (ref 8.5–10.5)
CALCIUM ALBUM COR SERPL-MCNC: 9 MG/DL (ref 8.5–10.5)
CALCIUM ALBUM COR SERPL-MCNC: 9.1 MG/DL (ref 8.5–10.5)
CALCIUM ALBUM COR SERPL-MCNC: 9.2 MG/DL (ref 8.5–10.5)
CALCIUM ALBUM COR SERPL-MCNC: 9.2 MG/DL (ref 8.5–10.5)
CALCIUM SERPL-MCNC: 7.6 MG/DL (ref 8.5–10.5)
CALCIUM SERPL-MCNC: 7.8 MG/DL (ref 8.5–10.5)
CALCIUM SERPL-MCNC: 7.9 MG/DL (ref 8.5–10.5)
CALCIUM SERPL-MCNC: 7.9 MG/DL (ref 8.5–10.5)
CALCIUM SERPL-MCNC: 8.5 MG/DL (ref 8.5–10.5)
CALCIUM SERPL-MCNC: 8.6 MG/DL (ref 8.5–10.5)
CFT BLD TEG: 1.8 MIN (ref 4.6–9.1)
CFT P HPASE BLD TEG: 2 MIN (ref 4.3–8.3)
CHLORIDE SERPL-SCNC: 104 MMOL/L (ref 96–112)
CHLORIDE SERPL-SCNC: 109 MMOL/L (ref 96–112)
CHLORIDE SERPL-SCNC: 112 MMOL/L (ref 96–112)
CHLORIDE SERPL-SCNC: 112 MMOL/L (ref 96–112)
CHLORIDE SERPL-SCNC: 113 MMOL/L (ref 96–112)
CHLORIDE SERPL-SCNC: 114 MMOL/L (ref 96–112)
CK SERPL-CCNC: 1077 U/L (ref 0–154)
CK SERPL-CCNC: 1150 U/L (ref 0–154)
CK SERPL-CCNC: 1159 U/L (ref 0–154)
CK SERPL-CCNC: 1199 U/L (ref 0–154)
CK SERPL-CCNC: 1271 U/L (ref 0–154)
CK SERPL-CCNC: 1477 U/L (ref 0–154)
CK SERPL-CCNC: 1532 U/L (ref 0–154)
CK SERPL-CCNC: 1809 U/L (ref 0–154)
CK SERPL-CCNC: 834 U/L (ref 0–154)
CK SERPL-CCNC: 859 U/L (ref 0–154)
CLOT ANGLE BLD TEG: 71.9 DEGREES (ref 63–78)
CO2 SERPL-SCNC: 13 MMOL/L (ref 20–33)
CO2 SERPL-SCNC: 16 MMOL/L (ref 20–33)
CO2 SERPL-SCNC: 16 MMOL/L (ref 20–33)
CO2 SERPL-SCNC: 17 MMOL/L (ref 20–33)
CO2 SERPL-SCNC: 17 MMOL/L (ref 20–33)
CO2 SERPL-SCNC: 18 MMOL/L (ref 20–33)
CO2 SERPL-SCNC: 19 MMOL/L (ref 20–33)
CO2 SERPL-SCNC: 20 MMOL/L (ref 20–33)
COLOR UR: YELLOW
COLOR UR: YELLOW
CREAT SERPL-MCNC: 1.66 MG/DL (ref 0.5–1.4)
CREAT SERPL-MCNC: 1.96 MG/DL (ref 0.5–1.4)
CREAT SERPL-MCNC: 2.04 MG/DL (ref 0.5–1.4)
CREAT SERPL-MCNC: 2.14 MG/DL (ref 0.5–1.4)
CREAT SERPL-MCNC: 2.33 MG/DL (ref 0.5–1.4)
CREAT SERPL-MCNC: 2.44 MG/DL (ref 0.5–1.4)
CREAT SERPL-MCNC: 2.69 MG/DL (ref 0.5–1.4)
CREAT SERPL-MCNC: 2.78 MG/DL (ref 0.5–1.4)
CT.EXTRINSIC BLD ROTEM: 1.6 MIN (ref 0.8–2.1)
EKG IMPRESSION: NORMAL
EOSINOPHIL # BLD AUTO: 0 K/UL (ref 0–0.51)
EOSINOPHIL # BLD AUTO: 0.01 K/UL (ref 0–0.51)
EOSINOPHIL # BLD AUTO: 0.01 K/UL (ref 0–0.51)
EOSINOPHIL NFR BLD: 0 % (ref 0–6.9)
EOSINOPHIL NFR BLD: 0.1 % (ref 0–6.9)
EOSINOPHIL NFR BLD: 0.1 % (ref 0–6.9)
EPI CELLS #/AREA URNS HPF: NEGATIVE /HPF
EPI CELLS #/AREA URNS HPF: NEGATIVE /HPF
ERYTHROCYTE [DISTWIDTH] IN BLOOD BY AUTOMATED COUNT: 46.4 FL (ref 35.9–50)
ERYTHROCYTE [DISTWIDTH] IN BLOOD BY AUTOMATED COUNT: 47.6 FL (ref 35.9–50)
ERYTHROCYTE [DISTWIDTH] IN BLOOD BY AUTOMATED COUNT: 48.2 FL (ref 35.9–50)
ERYTHROCYTE [DISTWIDTH] IN BLOOD BY AUTOMATED COUNT: 48.7 FL (ref 35.9–50)
FLUAV RNA SPEC QL NAA+PROBE: NEGATIVE
FLUBV RNA SPEC QL NAA+PROBE: NEGATIVE
GFR SERPLBLD CREATININE-BSD FMLA CKD-EPI: 20 ML/MIN/1.73 M 2
GFR SERPLBLD CREATININE-BSD FMLA CKD-EPI: 21 ML/MIN/1.73 M 2
GFR SERPLBLD CREATININE-BSD FMLA CKD-EPI: 24 ML/MIN/1.73 M 2
GFR SERPLBLD CREATININE-BSD FMLA CKD-EPI: 25 ML/MIN/1.73 M 2
GFR SERPLBLD CREATININE-BSD FMLA CKD-EPI: 28 ML/MIN/1.73 M 2
GFR SERPLBLD CREATININE-BSD FMLA CKD-EPI: 29 ML/MIN/1.73 M 2
GFR SERPLBLD CREATININE-BSD FMLA CKD-EPI: 31 ML/MIN/1.73 M 2
GFR SERPLBLD CREATININE-BSD FMLA CKD-EPI: 38 ML/MIN/1.73 M 2
GLOBULIN SER CALC-MCNC: 2.3 G/DL (ref 1.9–3.5)
GLOBULIN SER CALC-MCNC: 2.4 G/DL (ref 1.9–3.5)
GLOBULIN SER CALC-MCNC: 2.5 G/DL (ref 1.9–3.5)
GLOBULIN SER CALC-MCNC: 2.7 G/DL (ref 1.9–3.5)
GLUCOSE SERPL-MCNC: 103 MG/DL (ref 65–99)
GLUCOSE SERPL-MCNC: 104 MG/DL (ref 65–99)
GLUCOSE SERPL-MCNC: 110 MG/DL (ref 65–99)
GLUCOSE SERPL-MCNC: 111 MG/DL (ref 65–99)
GLUCOSE SERPL-MCNC: 140 MG/DL (ref 65–99)
GLUCOSE SERPL-MCNC: 160 MG/DL (ref 65–99)
GLUCOSE SERPL-MCNC: 94 MG/DL (ref 65–99)
GLUCOSE SERPL-MCNC: 95 MG/DL (ref 65–99)
GLUCOSE UR STRIP.AUTO-MCNC: NEGATIVE MG/DL
GLUCOSE UR STRIP.AUTO-MCNC: NEGATIVE MG/DL
HCO3 BLDA-SCNC: 17 MMOL/L (ref 17–25)
HCT VFR BLD AUTO: 31.3 % (ref 42–52)
HCT VFR BLD AUTO: 32.3 % (ref 42–52)
HCT VFR BLD AUTO: 32.9 % (ref 42–52)
HCT VFR BLD AUTO: 34.9 % (ref 42–52)
HGB BLD-MCNC: 10 G/DL (ref 14–18)
HGB BLD-MCNC: 10.6 G/DL (ref 14–18)
HGB BLD-MCNC: 10.9 G/DL (ref 14–18)
HGB BLD-MCNC: 11.7 G/DL (ref 14–18)
HYALINE CASTS #/AREA URNS LPF: ABNORMAL /LPF
HYALINE CASTS #/AREA URNS LPF: ABNORMAL /LPF
IMM GRANULOCYTES # BLD AUTO: 0.04 K/UL (ref 0–0.11)
IMM GRANULOCYTES # BLD AUTO: 0.04 K/UL (ref 0–0.11)
IMM GRANULOCYTES # BLD AUTO: 0.06 K/UL (ref 0–0.11)
IMM GRANULOCYTES NFR BLD AUTO: 0.5 % (ref 0–0.9)
IMM GRANULOCYTES NFR BLD AUTO: 0.5 % (ref 0–0.9)
IMM GRANULOCYTES NFR BLD AUTO: 0.8 % (ref 0–0.9)
INR PPP: 1.13 (ref 0.87–1.13)
KETONES UR STRIP.AUTO-MCNC: NEGATIVE MG/DL
KETONES UR STRIP.AUTO-MCNC: NEGATIVE MG/DL
LACTATE SERPL-SCNC: 1.4 MMOL/L (ref 0.5–2)
LACTATE SERPL-SCNC: 1.4 MMOL/L (ref 0.5–2)
LACTATE SERPL-SCNC: 1.9 MMOL/L (ref 0.5–2)
LACTATE SERPL-SCNC: 2.5 MMOL/L (ref 0.5–2)
LEUKOCYTE ESTERASE UR QL STRIP.AUTO: NEGATIVE
LEUKOCYTE ESTERASE UR QL STRIP.AUTO: NEGATIVE
LYMPHOCYTES # BLD AUTO: 0.35 K/UL (ref 1–4.8)
LYMPHOCYTES # BLD AUTO: 0.7 K/UL (ref 1–4.8)
LYMPHOCYTES # BLD AUTO: 1 K/UL (ref 1–4.8)
LYMPHOCYTES NFR BLD: 13.1 % (ref 22–41)
LYMPHOCYTES NFR BLD: 4.2 % (ref 22–41)
LYMPHOCYTES NFR BLD: 8.7 % (ref 22–41)
MAGNESIUM SERPL-MCNC: 1.7 MG/DL (ref 1.5–2.5)
MAGNESIUM SERPL-MCNC: 2 MG/DL (ref 1.5–2.5)
MCF BLD TEG: 58.8 MM (ref 52–69)
MCF.PLATELET INHIB BLD ROTEM: 18.9 MM (ref 15–32)
MCH RBC QN AUTO: 32.6 PG (ref 27–33)
MCH RBC QN AUTO: 32.7 PG (ref 27–33)
MCH RBC QN AUTO: 33 PG (ref 27–33)
MCH RBC QN AUTO: 33.3 PG (ref 27–33)
MCHC RBC AUTO-ENTMCNC: 31.9 G/DL (ref 33.7–35.3)
MCHC RBC AUTO-ENTMCNC: 32.2 G/DL (ref 33.7–35.3)
MCHC RBC AUTO-ENTMCNC: 33.5 G/DL (ref 33.7–35.3)
MCHC RBC AUTO-ENTMCNC: 33.7 G/DL (ref 33.7–35.3)
MCV RBC AUTO: 101.5 FL (ref 81.4–97.8)
MCV RBC AUTO: 102 FL (ref 81.4–97.8)
MCV RBC AUTO: 97.9 FL (ref 81.4–97.8)
MCV RBC AUTO: 99.4 FL (ref 81.4–97.8)
MICRO URNS: ABNORMAL
MICRO URNS: ABNORMAL
MONOCYTES # BLD AUTO: 0.4 K/UL (ref 0–0.85)
MONOCYTES # BLD AUTO: 0.51 K/UL (ref 0–0.85)
MONOCYTES # BLD AUTO: 0.6 K/UL (ref 0–0.85)
MONOCYTES NFR BLD AUTO: 5.3 % (ref 0–13.4)
MONOCYTES NFR BLD AUTO: 6.1 % (ref 0–13.4)
MONOCYTES NFR BLD AUTO: 7.4 % (ref 0–13.4)
NEUTROPHILS # BLD AUTO: 6.13 K/UL (ref 1.82–7.42)
NEUTROPHILS # BLD AUTO: 6.68 K/UL (ref 1.82–7.42)
NEUTROPHILS # BLD AUTO: 7.38 K/UL (ref 1.82–7.42)
NEUTROPHILS NFR BLD: 80.6 % (ref 44–72)
NEUTROPHILS NFR BLD: 82.9 % (ref 44–72)
NEUTROPHILS NFR BLD: 89 % (ref 44–72)
NITRITE UR QL STRIP.AUTO: NEGATIVE
NITRITE UR QL STRIP.AUTO: NEGATIVE
NRBC # BLD AUTO: 0 K/UL
NRBC BLD-RTO: 0 /100 WBC
PA AA BLD-ACNC: 81.2 % (ref 0–11)
PA ADP BLD-ACNC: 38.2 % (ref 0–17)
PCO2 BLDA: 29.7 MMHG (ref 26–37)
PH BLDA: 7.39 [PH] (ref 7.4–7.5)
PH UR STRIP.AUTO: 5 [PH] (ref 5–8)
PH UR STRIP.AUTO: 5 [PH] (ref 5–8)
PHOSPHATE SERPL-MCNC: 2.8 MG/DL (ref 2.5–4.5)
PLATELET # BLD AUTO: 123 K/UL (ref 164–446)
PLATELET # BLD AUTO: 78 K/UL (ref 164–446)
PLATELET # BLD AUTO: 82 K/UL (ref 164–446)
PLATELET # BLD AUTO: 93 K/UL (ref 164–446)
PMV BLD AUTO: 11.6 FL (ref 9–12.9)
PMV BLD AUTO: 12.3 FL (ref 9–12.9)
PMV BLD AUTO: 12.4 FL (ref 9–12.9)
PMV BLD AUTO: 12.5 FL (ref 9–12.9)
PO2 BLDA: 54.2 MMHG (ref 64–87)
POTASSIUM SERPL-SCNC: 3.6 MMOL/L (ref 3.6–5.5)
POTASSIUM SERPL-SCNC: 3.7 MMOL/L (ref 3.6–5.5)
POTASSIUM SERPL-SCNC: 3.7 MMOL/L (ref 3.6–5.5)
POTASSIUM SERPL-SCNC: 3.8 MMOL/L (ref 3.6–5.5)
POTASSIUM SERPL-SCNC: 3.8 MMOL/L (ref 3.6–5.5)
POTASSIUM SERPL-SCNC: 3.9 MMOL/L (ref 3.6–5.5)
PROT SERPL-MCNC: 4.8 G/DL (ref 6–8.2)
PROT SERPL-MCNC: 5.1 G/DL (ref 6–8.2)
PROT SERPL-MCNC: 5.1 G/DL (ref 6–8.2)
PROT SERPL-MCNC: 5.4 G/DL (ref 6–8.2)
PROT SERPL-MCNC: 5.5 G/DL (ref 6–8.2)
PROT SERPL-MCNC: 5.7 G/DL (ref 6–8.2)
PROT UR QL STRIP: 100 MG/DL
PROT UR QL STRIP: 30 MG/DL
PROTHROMBIN TIME: 14.4 SEC (ref 12–14.6)
RBC # BLD AUTO: 3.07 M/UL (ref 4.7–6.1)
RBC # BLD AUTO: 3.24 M/UL (ref 4.7–6.1)
RBC # BLD AUTO: 3.3 M/UL (ref 4.7–6.1)
RBC # BLD AUTO: 3.51 M/UL (ref 4.7–6.1)
RBC # URNS HPF: ABNORMAL /HPF
RBC # URNS HPF: ABNORMAL /HPF
RBC UR QL AUTO: ABNORMAL
RBC UR QL AUTO: ABNORMAL
RSV RNA SPEC QL NAA+PROBE: NEGATIVE
SAO2 % BLDA: 86.7 % (ref 93–99)
SARS-COV-2 RNA RESP QL NAA+PROBE: NOTDETECTED
SODIUM SERPL-SCNC: 138 MMOL/L (ref 135–145)
SODIUM SERPL-SCNC: 140 MMOL/L (ref 135–145)
SODIUM SERPL-SCNC: 141 MMOL/L (ref 135–145)
SODIUM SERPL-SCNC: 142 MMOL/L (ref 135–145)
SODIUM SERPL-SCNC: 145 MMOL/L (ref 135–145)
SP GR UR STRIP.AUTO: 1.02
SP GR UR STRIP.AUTO: 1.03
SPECIMEN SOURCE: NORMAL
TEG ALGORITHM TGALG: ABNORMAL
TROPONIN T SERPL-MCNC: 134 NG/L (ref 6–19)
TROPONIN T SERPL-MCNC: 195 NG/L (ref 6–19)
TROPONIN T SERPL-MCNC: 200 NG/L (ref 6–19)
URATE SERPL-MCNC: 7.6 MG/DL (ref 2.5–8.3)
UROBILINOGEN UR STRIP.AUTO-MCNC: 0.2 MG/DL
UROBILINOGEN UR STRIP.AUTO-MCNC: 0.2 MG/DL
WBC # BLD AUTO: 7.6 K/UL (ref 4.8–10.8)
WBC # BLD AUTO: 7.6 K/UL (ref 4.8–10.8)
WBC # BLD AUTO: 8.1 K/UL (ref 4.8–10.8)
WBC # BLD AUTO: 8.3 K/UL (ref 4.8–10.8)
WBC #/AREA URNS HPF: ABNORMAL /HPF
WBC #/AREA URNS HPF: ABNORMAL /HPF

## 2023-01-01 PROCEDURE — 700111 HCHG RX REV CODE 636 W/ 250 OVERRIDE (IP): Performed by: STUDENT IN AN ORGANIZED HEALTH CARE EDUCATION/TRAINING PROGRAM

## 2023-01-01 PROCEDURE — 80053 COMPREHEN METABOLIC PANEL: CPT

## 2023-01-01 PROCEDURE — 93005 ELECTROCARDIOGRAM TRACING: CPT | Performed by: STUDENT IN AN ORGANIZED HEALTH CARE EDUCATION/TRAINING PROGRAM

## 2023-01-01 PROCEDURE — 93010 ELECTROCARDIOGRAM REPORT: CPT | Performed by: INTERNAL MEDICINE

## 2023-01-01 PROCEDURE — 31720 CLEARANCE OF AIRWAYS: CPT

## 2023-01-01 PROCEDURE — 85576 BLOOD PLATELET AGGREGATION: CPT

## 2023-01-01 PROCEDURE — 80048 BASIC METABOLIC PNL TOTAL CA: CPT

## 2023-01-01 PROCEDURE — 92612 ENDOSCOPY SWALLOW (FEES) VID: CPT

## 2023-01-01 PROCEDURE — 84484 ASSAY OF TROPONIN QUANT: CPT

## 2023-01-01 PROCEDURE — 770020 HCHG ROOM/CARE - TELE (206)

## 2023-01-01 PROCEDURE — 94640 AIRWAY INHALATION TREATMENT: CPT

## 2023-01-01 PROCEDURE — 700102 HCHG RX REV CODE 250 W/ 637 OVERRIDE(OP): Performed by: STUDENT IN AN ORGANIZED HEALTH CARE EDUCATION/TRAINING PROGRAM

## 2023-01-01 PROCEDURE — C9803 HOPD COVID-19 SPEC COLLECT: HCPCS | Performed by: EMERGENCY MEDICINE

## 2023-01-01 PROCEDURE — 36415 COLL VENOUS BLD VENIPUNCTURE: CPT

## 2023-01-01 PROCEDURE — 700101 HCHG RX REV CODE 250

## 2023-01-01 PROCEDURE — 700101 HCHG RX REV CODE 250: Performed by: STUDENT IN AN ORGANIZED HEALTH CARE EDUCATION/TRAINING PROGRAM

## 2023-01-01 PROCEDURE — 83735 ASSAY OF MAGNESIUM: CPT

## 2023-01-01 PROCEDURE — 97530 THERAPEUTIC ACTIVITIES: CPT | Mod: CQ

## 2023-01-01 PROCEDURE — 85025 COMPLETE CBC W/AUTO DIFF WBC: CPT

## 2023-01-01 PROCEDURE — 0HQ0XZZ REPAIR SCALP SKIN, EXTERNAL APPROACH: ICD-10-PCS | Performed by: EMERGENCY MEDICINE

## 2023-01-01 PROCEDURE — 94667 MNPJ CHEST WALL 1ST: CPT

## 2023-01-01 PROCEDURE — 82803 BLOOD GASES ANY COMBINATION: CPT

## 2023-01-01 PROCEDURE — 99232 SBSQ HOSP IP/OBS MODERATE 35: CPT | Mod: GC | Performed by: FAMILY MEDICINE

## 2023-01-01 PROCEDURE — 82550 ASSAY OF CK (CPK): CPT

## 2023-01-01 PROCEDURE — 93005 ELECTROCARDIOGRAM TRACING: CPT | Performed by: EMERGENCY MEDICINE

## 2023-01-01 PROCEDURE — 700105 HCHG RX REV CODE 258

## 2023-01-01 PROCEDURE — 85730 THROMBOPLASTIN TIME PARTIAL: CPT

## 2023-01-01 PROCEDURE — 85610 PROTHROMBIN TIME: CPT

## 2023-01-01 PROCEDURE — 700111 HCHG RX REV CODE 636 W/ 250 OVERRIDE (IP)

## 2023-01-01 PROCEDURE — 85384 FIBRINOGEN ACTIVITY: CPT

## 2023-01-01 PROCEDURE — 73060 X-RAY EXAM OF HUMERUS: CPT | Mod: LT

## 2023-01-01 PROCEDURE — 97166 OT EVAL MOD COMPLEX 45 MIN: CPT

## 2023-01-01 PROCEDURE — 94668 MNPJ CHEST WALL SBSQ: CPT

## 2023-01-01 PROCEDURE — A9270 NON-COVERED ITEM OR SERVICE: HCPCS | Performed by: STUDENT IN AN ORGANIZED HEALTH CARE EDUCATION/TRAINING PROGRAM

## 2023-01-01 PROCEDURE — 71260 CT THORAX DX C+: CPT

## 2023-01-01 PROCEDURE — 94760 N-INVAS EAR/PLS OXIMETRY 1: CPT

## 2023-01-01 PROCEDURE — 81001 URINALYSIS AUTO W/SCOPE: CPT

## 2023-01-01 PROCEDURE — 700105 HCHG RX REV CODE 258: Performed by: STUDENT IN AN ORGANIZED HEALTH CARE EDUCATION/TRAINING PROGRAM

## 2023-01-01 PROCEDURE — 85347 COAGULATION TIME ACTIVATED: CPT

## 2023-01-01 PROCEDURE — 72128 CT CHEST SPINE W/O DYE: CPT

## 2023-01-01 PROCEDURE — 84100 ASSAY OF PHOSPHORUS: CPT

## 2023-01-01 PROCEDURE — 306565 RIGID MIT RESTRAINT(PAIR)

## 2023-01-01 PROCEDURE — 83605 ASSAY OF LACTIC ACID: CPT

## 2023-01-01 PROCEDURE — 700105 HCHG RX REV CODE 258: Performed by: EMERGENCY MEDICINE

## 2023-01-01 PROCEDURE — 83605 ASSAY OF LACTIC ACID: CPT | Mod: 91

## 2023-01-01 PROCEDURE — 304999 HCHG REPAIR-SIMPLE/INTERMED LEVEL 1

## 2023-01-01 PROCEDURE — 700101 HCHG RX REV CODE 250: Performed by: EMERGENCY MEDICINE

## 2023-01-01 PROCEDURE — 51798 US URINE CAPACITY MEASURE: CPT

## 2023-01-01 PROCEDURE — 99238 HOSP IP/OBS DSCHRG MGMT 30/<: CPT | Mod: GC | Performed by: FAMILY MEDICINE

## 2023-01-01 PROCEDURE — 99222 1ST HOSP IP/OBS MODERATE 55: CPT | Mod: AI,GC | Performed by: FAMILY MEDICINE

## 2023-01-01 PROCEDURE — 92610 EVALUATE SWALLOWING FUNCTION: CPT

## 2023-01-01 PROCEDURE — 73090 X-RAY EXAM OF FOREARM: CPT | Mod: LT

## 2023-01-01 PROCEDURE — 700117 HCHG RX CONTRAST REV CODE 255: Performed by: EMERGENCY MEDICINE

## 2023-01-01 PROCEDURE — 99285 EMERGENCY DEPT VISIT HI MDM: CPT

## 2023-01-01 PROCEDURE — 72125 CT NECK SPINE W/O DYE: CPT

## 2023-01-01 PROCEDURE — 72131 CT LUMBAR SPINE W/O DYE: CPT

## 2023-01-01 PROCEDURE — 97163 PT EVAL HIGH COMPLEX 45 MIN: CPT

## 2023-01-01 PROCEDURE — 770001 HCHG ROOM/CARE - MED/SURG/GYN PRIV*

## 2023-01-01 PROCEDURE — 0241U HCHG SARS-COV-2 COVID-19 NFCT DS RESP RNA 4 TRGT MIC: CPT

## 2023-01-01 PROCEDURE — 71045 X-RAY EXAM CHEST 1 VIEW: CPT

## 2023-01-01 PROCEDURE — 305308 HCHG STAPLER,SKIN,DISP.

## 2023-01-01 PROCEDURE — 70486 CT MAXILLOFACIAL W/O DYE: CPT

## 2023-01-01 PROCEDURE — 85027 COMPLETE CBC AUTOMATED: CPT

## 2023-01-01 PROCEDURE — 70450 CT HEAD/BRAIN W/O DYE: CPT

## 2023-01-01 PROCEDURE — 84550 ASSAY OF BLOOD/URIC ACID: CPT

## 2023-01-01 RX ORDER — SCOLOPAMINE TRANSDERMAL SYSTEM 1 MG/1
1 PATCH, EXTENDED RELEASE TRANSDERMAL
Status: DISCONTINUED | OUTPATIENT
Start: 2023-01-01 | End: 2023-01-01 | Stop reason: HOSPADM

## 2023-01-01 RX ORDER — MORPHINE SULFATE 100 MG/5ML
10 SOLUTION ORAL
Status: DISCONTINUED | OUTPATIENT
Start: 2023-01-01 | End: 2023-01-01

## 2023-01-01 RX ORDER — HYDROCHLOROTHIAZIDE 25 MG/1
TABLET ORAL
Qty: 100 TABLET | Refills: 3 | Status: ON HOLD
Start: 2023-01-01 | End: 2023-01-01

## 2023-01-01 RX ORDER — ONDANSETRON 2 MG/ML
8 INJECTION INTRAMUSCULAR; INTRAVENOUS EVERY 8 HOURS PRN
Status: DISCONTINUED | OUTPATIENT
Start: 2023-01-01 | End: 2023-01-01 | Stop reason: HOSPADM

## 2023-01-01 RX ORDER — CARBOXYMETHYLCELLULOSE SODIUM 5 MG/ML
1 SOLUTION/ DROPS OPHTHALMIC PRN
Qty: 30 ML | Refills: 0 | Status: SHIPPED | OUTPATIENT
Start: 2023-01-01 | End: 2023-06-01

## 2023-01-01 RX ORDER — LORAZEPAM 2 MG/ML
1 INJECTION INTRAMUSCULAR
Status: DISCONTINUED | OUTPATIENT
Start: 2023-01-01 | End: 2023-01-01 | Stop reason: HOSPADM

## 2023-01-01 RX ORDER — SODIUM CHLORIDE 9 MG/ML
1000 INJECTION, SOLUTION INTRAVENOUS ONCE
Status: COMPLETED | OUTPATIENT
Start: 2023-01-01 | End: 2023-01-01

## 2023-01-01 RX ORDER — CARBOXYMETHYLCELLULOSE SODIUM 5 MG/ML
1 SOLUTION/ DROPS OPHTHALMIC PRN
Status: DISCONTINUED | OUTPATIENT
Start: 2023-01-01 | End: 2023-01-01 | Stop reason: HOSPADM

## 2023-01-01 RX ORDER — LORAZEPAM 2 MG/ML
1 CONCENTRATE ORAL
Qty: 30 ML | Refills: 0 | Status: SHIPPED | OUTPATIENT
Start: 2023-01-01 | End: 2023-05-09

## 2023-01-01 RX ORDER — ACETAMINOPHEN 325 MG/1
650 TABLET ORAL EVERY 6 HOURS PRN
Status: DISCONTINUED | OUTPATIENT
Start: 2023-01-01 | End: 2023-01-01

## 2023-01-01 RX ORDER — HALOPERIDOL 5 MG/ML
1 INJECTION INTRAMUSCULAR ONCE
Status: COMPLETED | OUTPATIENT
Start: 2023-01-01 | End: 2023-01-01

## 2023-01-01 RX ORDER — MORPHINE SULFATE 4 MG/ML
1 INJECTION INTRAVENOUS
Status: DISCONTINUED | OUTPATIENT
Start: 2023-01-01 | End: 2023-01-01

## 2023-01-01 RX ORDER — GLYCOPYRROLATE 1 MG/1
1 TABLET ORAL 3 TIMES DAILY PRN
Status: DISCONTINUED | OUTPATIENT
Start: 2023-01-01 | End: 2023-01-01 | Stop reason: HOSPADM

## 2023-01-01 RX ORDER — GLYCOPYRROLATE 1 MG/1
1 TABLET ORAL 3 TIMES DAILY PRN
Qty: 90 TABLET | Refills: 0 | Status: SHIPPED | OUTPATIENT
Start: 2023-01-01 | End: 2023-06-01

## 2023-01-01 RX ORDER — MORPHINE SULFATE 100 MG/5ML
5 SOLUTION ORAL
Status: DISCONTINUED | OUTPATIENT
Start: 2023-01-01 | End: 2023-01-01 | Stop reason: HOSPADM

## 2023-01-01 RX ORDER — DOCUSATE SODIUM 100 MG/1
100 CAPSULE, LIQUID FILLED ORAL EVERY 12 HOURS
Status: DISCONTINUED | OUTPATIENT
Start: 2023-01-01 | End: 2023-01-01 | Stop reason: HOSPADM

## 2023-01-01 RX ORDER — BISACODYL 10 MG
10 SUPPOSITORY, RECTAL RECTAL
Status: DISCONTINUED | OUTPATIENT
Start: 2023-01-01 | End: 2023-01-01 | Stop reason: HOSPADM

## 2023-01-01 RX ORDER — HYDROMORPHONE HYDROCHLORIDE 1 MG/ML
0.5 INJECTION, SOLUTION INTRAMUSCULAR; INTRAVENOUS; SUBCUTANEOUS EVERY 4 HOURS PRN
Status: DISCONTINUED | OUTPATIENT
Start: 2023-01-01 | End: 2023-01-01

## 2023-01-01 RX ORDER — ONDANSETRON 4 MG/1
4 TABLET, ORALLY DISINTEGRATING ORAL EVERY 4 HOURS PRN
Status: DISCONTINUED | OUTPATIENT
Start: 2023-01-01 | End: 2023-01-01

## 2023-01-01 RX ORDER — MORPHINE SULFATE 100 MG/5ML
5 SOLUTION ORAL
Qty: 30 ML | Refills: 0 | Status: SHIPPED | OUTPATIENT
Start: 2023-01-01 | End: 2023-05-09

## 2023-01-01 RX ORDER — LIDOCAINE HYDROCHLORIDE AND EPINEPHRINE BITARTRATE 20; .01 MG/ML; MG/ML
10 INJECTION, SOLUTION SUBCUTANEOUS ONCE
Status: COMPLETED | OUTPATIENT
Start: 2023-01-01 | End: 2023-01-01

## 2023-01-01 RX ORDER — HEPARIN SODIUM 5000 [USP'U]/ML
5000 INJECTION, SOLUTION INTRAVENOUS; SUBCUTANEOUS EVERY 8 HOURS
Status: DISCONTINUED | OUTPATIENT
Start: 2023-01-01 | End: 2023-01-01

## 2023-01-01 RX ORDER — ATORVASTATIN CALCIUM 20 MG/1
20 TABLET, FILM COATED ORAL DAILY
Qty: 100 TABLET | Refills: 3 | Status: ON HOLD
Start: 2023-01-01 | End: 2023-01-01

## 2023-01-01 RX ORDER — LOSARTAN POTASSIUM 50 MG/1
50 TABLET ORAL
Status: DISCONTINUED | OUTPATIENT
Start: 2023-01-01 | End: 2023-01-01

## 2023-01-01 RX ORDER — HYDROMORPHONE HYDROCHLORIDE 1 MG/ML
0.5 INJECTION, SOLUTION INTRAMUSCULAR; INTRAVENOUS; SUBCUTANEOUS ONCE
Status: COMPLETED | OUTPATIENT
Start: 2023-01-01 | End: 2023-01-01

## 2023-01-01 RX ORDER — LORAZEPAM 2 MG/ML
1 CONCENTRATE ORAL
Status: DISCONTINUED | OUTPATIENT
Start: 2023-01-01 | End: 2023-01-01 | Stop reason: HOSPADM

## 2023-01-01 RX ORDER — IPRATROPIUM BROMIDE AND ALBUTEROL SULFATE 2.5; .5 MG/3ML; MG/3ML
3 SOLUTION RESPIRATORY (INHALATION)
Status: DISCONTINUED | OUTPATIENT
Start: 2023-01-01 | End: 2023-01-01

## 2023-01-01 RX ORDER — SODIUM CHLORIDE 9 MG/ML
INJECTION, SOLUTION INTRAVENOUS CONTINUOUS
Status: ACTIVE | OUTPATIENT
Start: 2023-01-01 | End: 2023-01-01

## 2023-01-01 RX ORDER — METOPROLOL TARTRATE 1 MG/ML
5 INJECTION, SOLUTION INTRAVENOUS
Status: DISCONTINUED | OUTPATIENT
Start: 2023-01-01 | End: 2023-01-01

## 2023-01-01 RX ORDER — HYDROMORPHONE HYDROCHLORIDE 1 MG/ML
1 INJECTION, SOLUTION INTRAMUSCULAR; INTRAVENOUS; SUBCUTANEOUS
Status: DISCONTINUED | OUTPATIENT
Start: 2023-01-01 | End: 2023-01-01 | Stop reason: HOSPADM

## 2023-01-01 RX ORDER — ONDANSETRON 2 MG/ML
4 INJECTION INTRAMUSCULAR; INTRAVENOUS EVERY 4 HOURS PRN
Status: DISCONTINUED | OUTPATIENT
Start: 2023-01-01 | End: 2023-01-01

## 2023-01-01 RX ORDER — IPRATROPIUM BROMIDE AND ALBUTEROL SULFATE 2.5; .5 MG/3ML; MG/3ML
3 SOLUTION RESPIRATORY (INHALATION) ONCE
Status: COMPLETED | OUTPATIENT
Start: 2023-01-01 | End: 2023-01-01

## 2023-01-01 RX ORDER — SCOLOPAMINE TRANSDERMAL SYSTEM 1 MG/1
1 PATCH, EXTENDED RELEASE TRANSDERMAL
Status: DISCONTINUED | OUTPATIENT
Start: 2023-01-01 | End: 2023-01-01

## 2023-01-01 RX ORDER — PSEUDOEPHEDRINE HCL 30 MG
100 TABLET ORAL 2 TIMES DAILY PRN
Qty: 60 CAPSULE | Refills: 0 | Status: SHIPPED | OUTPATIENT
Start: 2023-01-01 | End: 2023-06-01

## 2023-01-01 RX ORDER — HYDROMORPHONE HYDROCHLORIDE 2 MG/ML
4 INJECTION, SOLUTION INTRAMUSCULAR; INTRAVENOUS; SUBCUTANEOUS
Status: DISCONTINUED | OUTPATIENT
Start: 2023-01-01 | End: 2023-01-01

## 2023-01-01 RX ORDER — ONDANSETRON 4 MG/1
8 TABLET, ORALLY DISINTEGRATING ORAL EVERY 8 HOURS PRN
Status: DISCONTINUED | OUTPATIENT
Start: 2023-01-01 | End: 2023-01-01 | Stop reason: HOSPADM

## 2023-01-01 RX ORDER — AMOXICILLIN 250 MG
2 CAPSULE ORAL 2 TIMES DAILY
Status: DISCONTINUED | OUTPATIENT
Start: 2023-01-01 | End: 2023-01-01

## 2023-01-01 RX ORDER — ATROPINE SULFATE 10 MG/ML
2 SOLUTION/ DROPS OPHTHALMIC EVERY 4 HOURS PRN
Status: DISCONTINUED | OUTPATIENT
Start: 2023-01-01 | End: 2023-01-01 | Stop reason: HOSPADM

## 2023-01-01 RX ORDER — ONDANSETRON 8 MG/1
8 TABLET, ORALLY DISINTEGRATING ORAL EVERY 8 HOURS PRN
Qty: 10 TABLET | Refills: 0 | Status: SHIPPED | OUTPATIENT
Start: 2023-01-01

## 2023-01-01 RX ORDER — SODIUM CHLORIDE, SODIUM LACTATE, POTASSIUM CHLORIDE, CALCIUM CHLORIDE 600; 310; 30; 20 MG/100ML; MG/100ML; MG/100ML; MG/100ML
1000 INJECTION, SOLUTION INTRAVENOUS ONCE
Status: COMPLETED | OUTPATIENT
Start: 2023-01-01 | End: 2023-01-01

## 2023-01-01 RX ORDER — POLYETHYLENE GLYCOL 3350 17 G/17G
1 POWDER, FOR SOLUTION ORAL
Status: DISCONTINUED | OUTPATIENT
Start: 2023-01-01 | End: 2023-01-01

## 2023-01-01 RX ORDER — LACTULOSE 20 G/30ML
10 SOLUTION ORAL
Status: DISCONTINUED | OUTPATIENT
Start: 2023-01-01 | End: 2023-01-01 | Stop reason: HOSPADM

## 2023-01-01 RX ORDER — ACETAMINOPHEN 325 MG/1
650 TABLET ORAL EVERY 4 HOURS PRN
COMMUNITY

## 2023-01-01 RX ORDER — LABETALOL HYDROCHLORIDE 5 MG/ML
10 INJECTION, SOLUTION INTRAVENOUS EVERY 4 HOURS PRN
Status: DISCONTINUED | OUTPATIENT
Start: 2023-01-01 | End: 2023-01-01

## 2023-01-01 RX ORDER — SODIUM BICARBONATE IN D5W 150/1000ML
PLASTIC BAG, INJECTION (ML) INTRAVENOUS CONTINUOUS
Status: DISCONTINUED | OUTPATIENT
Start: 2023-01-01 | End: 2023-01-01

## 2023-01-01 RX ORDER — BISACODYL 10 MG
10 SUPPOSITORY, RECTAL RECTAL
Status: DISCONTINUED | OUTPATIENT
Start: 2023-01-01 | End: 2023-01-01

## 2023-01-01 RX ORDER — GLYCOPYRROLATE 0.2 MG/ML
0.2 INJECTION INTRAMUSCULAR; INTRAVENOUS 3 TIMES DAILY PRN
Status: DISCONTINUED | OUTPATIENT
Start: 2023-01-01 | End: 2023-01-01 | Stop reason: HOSPADM

## 2023-01-01 RX ORDER — SCOLOPAMINE TRANSDERMAL SYSTEM 1 MG/1
1 PATCH, EXTENDED RELEASE TRANSDERMAL
Qty: 4 PATCH | Refills: 3 | Status: SHIPPED | OUTPATIENT
Start: 2023-01-01

## 2023-01-01 RX ADMIN — SODIUM CHLORIDE: 9 INJECTION, SOLUTION INTRAVENOUS at 05:51

## 2023-01-01 RX ADMIN — IPRATROPIUM BROMIDE AND ALBUTEROL SULFATE 3 ML: 2.5; .5 SOLUTION RESPIRATORY (INHALATION) at 20:30

## 2023-01-01 RX ADMIN — IPRATROPIUM BROMIDE AND ALBUTEROL SULFATE 3 ML: 2.5; .5 SOLUTION RESPIRATORY (INHALATION) at 01:57

## 2023-01-01 RX ADMIN — MINERAL OIL, PETROLATUM 1 APPLICATION: 425; 573 OINTMENT OPHTHALMIC at 23:45

## 2023-01-01 RX ADMIN — SODIUM CHLORIDE: 9 INJECTION, SOLUTION INTRAVENOUS at 16:08

## 2023-01-01 RX ADMIN — IPRATROPIUM BROMIDE AND ALBUTEROL SULFATE 3 ML: 2.5; .5 SOLUTION RESPIRATORY (INHALATION) at 14:47

## 2023-01-01 RX ADMIN — IPRATROPIUM BROMIDE AND ALBUTEROL SULFATE 3 ML: 2.5; .5 SOLUTION RESPIRATORY (INHALATION) at 22:29

## 2023-01-01 RX ADMIN — SODIUM BICARBONATE: 84 INJECTION, SOLUTION INTRAVENOUS at 01:44

## 2023-01-01 RX ADMIN — SODIUM CHLORIDE: 9 INJECTION, SOLUTION INTRAVENOUS at 14:24

## 2023-01-01 RX ADMIN — HYDROMORPHONE HYDROCHLORIDE 0.5 MG: 1 INJECTION, SOLUTION INTRAMUSCULAR; INTRAVENOUS; SUBCUTANEOUS at 11:38

## 2023-01-01 RX ADMIN — IPRATROPIUM BROMIDE AND ALBUTEROL SULFATE 3 ML: 2.5; .5 SOLUTION RESPIRATORY (INHALATION) at 08:57

## 2023-01-01 RX ADMIN — SODIUM CHLORIDE: 9 INJECTION, SOLUTION INTRAVENOUS at 22:00

## 2023-01-01 RX ADMIN — MORPHINE SULFATE 1 MG: 4 INJECTION, SOLUTION INTRAMUSCULAR; INTRAVENOUS at 01:43

## 2023-01-01 RX ADMIN — DOCUSATE SODIUM 50 MG AND SENNOSIDES 8.6 MG 2 TABLET: 8.6; 5 TABLET, FILM COATED ORAL at 04:46

## 2023-01-01 RX ADMIN — GLYCOPYRROLATE 0.2 MG: 0.2 INJECTION INTRAMUSCULAR; INTRAVENOUS at 21:19

## 2023-01-01 RX ADMIN — IPRATROPIUM BROMIDE AND ALBUTEROL SULFATE 3 ML: 2.5; .5 SOLUTION RESPIRATORY (INHALATION) at 06:49

## 2023-01-01 RX ADMIN — DOCUSATE SODIUM 50 MG AND SENNOSIDES 8.6 MG 2 TABLET: 8.6; 5 TABLET, FILM COATED ORAL at 16:43

## 2023-01-01 RX ADMIN — MORPHINE SULFATE 1 MG: 4 INJECTION, SOLUTION INTRAMUSCULAR; INTRAVENOUS at 14:00

## 2023-01-01 RX ADMIN — SODIUM CHLORIDE: 9 INJECTION, SOLUTION INTRAVENOUS at 06:28

## 2023-01-01 RX ADMIN — IPRATROPIUM BROMIDE AND ALBUTEROL SULFATE 3 ML: 2.5; .5 SOLUTION RESPIRATORY (INHALATION) at 18:47

## 2023-01-01 RX ADMIN — HYDROMORPHONE HYDROCHLORIDE 1 MG: 1 INJECTION, SOLUTION INTRAMUSCULAR; INTRAVENOUS; SUBCUTANEOUS at 03:52

## 2023-01-01 RX ADMIN — SODIUM CHLORIDE, POTASSIUM CHLORIDE, SODIUM LACTATE AND CALCIUM CHLORIDE 1000 ML: 600; 310; 30; 20 INJECTION, SOLUTION INTRAVENOUS at 11:42

## 2023-01-01 RX ADMIN — IPRATROPIUM BROMIDE AND ALBUTEROL SULFATE 3 ML: 2.5; .5 SOLUTION RESPIRATORY (INHALATION) at 12:20

## 2023-01-01 RX ADMIN — LORAZEPAM 1 MG: 2 INJECTION INTRAMUSCULAR; INTRAVENOUS at 15:08

## 2023-01-01 RX ADMIN — LABETALOL HYDROCHLORIDE 10 MG: 5 INJECTION INTRAVENOUS at 05:36

## 2023-01-01 RX ADMIN — HYDROMORPHONE HYDROCHLORIDE 0.5 MG: 1 INJECTION, SOLUTION INTRAMUSCULAR; INTRAVENOUS; SUBCUTANEOUS at 19:58

## 2023-01-01 RX ADMIN — HYDROMORPHONE HYDROCHLORIDE 1 MG: 1 INJECTION, SOLUTION INTRAMUSCULAR; INTRAVENOUS; SUBCUTANEOUS at 21:10

## 2023-01-01 RX ADMIN — LORAZEPAM 1 MG: 2 INJECTION INTRAMUSCULAR; INTRAVENOUS at 23:59

## 2023-01-01 RX ADMIN — HEPARIN SODIUM 5000 UNITS: 5000 INJECTION, SOLUTION INTRAVENOUS; SUBCUTANEOUS at 06:31

## 2023-01-01 RX ADMIN — LIDOCAINE HYDROCHLORIDE AND EPINEPHRINE 10 ML: 20; 10 INJECTION, SOLUTION INFILTRATION; PERINEURAL at 13:00

## 2023-01-01 RX ADMIN — SCOPALAMINE 1 PATCH: 1 PATCH, EXTENDED RELEASE TRANSDERMAL at 10:50

## 2023-01-01 RX ADMIN — LORAZEPAM 1 MG: 2 INJECTION INTRAMUSCULAR; INTRAVENOUS at 03:52

## 2023-01-01 RX ADMIN — SODIUM CHLORIDE 1000 ML: 9 INJECTION, SOLUTION INTRAVENOUS at 06:36

## 2023-01-01 RX ADMIN — HYDROMORPHONE HYDROCHLORIDE 1 MG: 1 INJECTION, SOLUTION INTRAMUSCULAR; INTRAVENOUS; SUBCUTANEOUS at 23:59

## 2023-01-01 RX ADMIN — IOHEXOL 100 ML: 350 INJECTION, SOLUTION INTRAVENOUS at 14:15

## 2023-01-01 RX ADMIN — HALOPERIDOL LACTATE 1 MG: 5 INJECTION, SOLUTION INTRAMUSCULAR at 02:56

## 2023-01-01 RX ADMIN — ACETAMINOPHEN 650 MG: 325 TABLET, FILM COATED ORAL at 17:30

## 2023-01-01 RX ADMIN — HYDROMORPHONE HYDROCHLORIDE 0.5 MG: 1 INJECTION, SOLUTION INTRAMUSCULAR; INTRAVENOUS; SUBCUTANEOUS at 23:15

## 2023-01-01 RX ADMIN — HYDROMORPHONE HYDROCHLORIDE 1 MG: 1 INJECTION, SOLUTION INTRAMUSCULAR; INTRAVENOUS; SUBCUTANEOUS at 15:00

## 2023-01-01 RX ADMIN — METOPROLOL TARTRATE 5 MG: 1 INJECTION, SOLUTION INTRAVENOUS at 17:22

## 2023-01-01 RX ADMIN — SODIUM CHLORIDE 1000 ML: 9 INJECTION, SOLUTION INTRAVENOUS at 06:25

## 2023-01-01 RX ADMIN — SODIUM BICARBONATE: 84 INJECTION, SOLUTION INTRAVENOUS at 14:46

## 2023-01-01 RX ADMIN — IPRATROPIUM BROMIDE AND ALBUTEROL SULFATE 3 ML: 2.5; .5 SOLUTION RESPIRATORY (INHALATION) at 00:55

## 2023-01-01 RX ADMIN — DOCUSATE SODIUM 50 MG AND SENNOSIDES 8.6 MG 2 TABLET: 8.6; 5 TABLET, FILM COATED ORAL at 18:00

## 2023-01-01 RX ADMIN — HYDROMORPHONE HYDROCHLORIDE 0.5 MG: 1 INJECTION, SOLUTION INTRAMUSCULAR; INTRAVENOUS; SUBCUTANEOUS at 20:55

## 2023-01-01 RX ADMIN — HYDROMORPHONE HYDROCHLORIDE 1 MG: 1 INJECTION, SOLUTION INTRAMUSCULAR; INTRAVENOUS; SUBCUTANEOUS at 18:11

## 2023-01-01 RX ADMIN — SODIUM CHLORIDE: 9 INJECTION, SOLUTION INTRAVENOUS at 18:42

## 2023-01-01 ASSESSMENT — PAIN DESCRIPTION - PAIN TYPE
TYPE: ACUTE PAIN

## 2023-01-01 ASSESSMENT — GAIT ASSESSMENTS
GAIT LEVEL OF ASSIST: MODERATE ASSIST
ASSISTIVE DEVICE: HAND HELD ASSIST
DISTANCE (FEET): 6
GAIT LEVEL OF ASSIST: UNABLE TO PARTICIPATE

## 2023-01-01 ASSESSMENT — LIFESTYLE VARIABLES
TOTAL SCORE: 0
HOW MANY TIMES IN THE PAST YEAR HAVE YOU HAD 5 OR MORE DRINKS IN A DAY: 0
AVERAGE NUMBER OF DAYS PER WEEK YOU HAVE A DRINK CONTAINING ALCOHOL: 0
TOTAL SCORE: 0
HAVE PEOPLE ANNOYED YOU BY CRITICIZING YOUR DRINKING: NO
HAVE YOU EVER FELT YOU SHOULD CUT DOWN ON YOUR DRINKING: NO
EVER FELT BAD OR GUILTY ABOUT YOUR DRINKING: NO
ON A TYPICAL DAY WHEN YOU DRINK ALCOHOL HOW MANY DRINKS DO YOU HAVE: 0
CONSUMPTION TOTAL: NEGATIVE
ALCOHOL_USE: NO
DOES PATIENT WANT TO STOP DRINKING: NO
EVER HAD A DRINK FIRST THING IN THE MORNING TO STEADY YOUR NERVES TO GET RID OF A HANGOVER: NO
TOTAL SCORE: 0

## 2023-01-01 ASSESSMENT — COGNITIVE AND FUNCTIONAL STATUS - GENERAL
MOBILITY SCORE: 8
MOVING TO AND FROM BED TO CHAIR: A LITTLE
DRESSING REGULAR LOWER BODY CLOTHING: A LOT
DRESSING REGULAR UPPER BODY CLOTHING: A LOT
DAILY ACTIVITIY SCORE: 12
WALKING IN HOSPITAL ROOM: A LOT
TURNING FROM BACK TO SIDE WHILE IN FLAT BAD: A LITTLE
TOILETING: A LITTLE
MOVING FROM LYING ON BACK TO SITTING ON SIDE OF FLAT BED: UNABLE
EATING MEALS: A LITTLE
SUGGESTED CMS G CODE MODIFIER MOBILITY: CM
WALKING IN HOSPITAL ROOM: A LOT
SUGGESTED CMS G CODE MODIFIER MOBILITY: CK
SUGGESTED CMS G CODE MODIFIER MOBILITY: CM
MOVING FROM LYING ON BACK TO SITTING ON SIDE OF FLAT BED: UNABLE
STANDING UP FROM CHAIR USING ARMS: A LOT
SUGGESTED CMS G CODE MODIFIER DAILY ACTIVITY: CK
CLIMB 3 TO 5 STEPS WITH RAILING: A LOT
DRESSING REGULAR UPPER BODY CLOTHING: A LOT
MOVING TO AND FROM BED TO CHAIR: UNABLE
TURNING FROM BACK TO SIDE WHILE IN FLAT BAD: UNABLE
HELP NEEDED FOR BATHING: A LOT
CLIMB 3 TO 5 STEPS WITH RAILING: TOTAL
DRESSING REGULAR LOWER BODY CLOTHING: A LITTLE
PERSONAL GROOMING: A LITTLE
MOBILITY SCORE: 16
MOVING TO AND FROM BED TO CHAIR: UNABLE
DAILY ACTIVITIY SCORE: 17
STANDING UP FROM CHAIR USING ARMS: A LOT
EATING MEALS: A LOT
MOVING FROM LYING ON BACK TO SITTING ON SIDE OF FLAT BED: A LITTLE
TOILETING: A LOT
TURNING FROM BACK TO SIDE WHILE IN FLAT BAD: UNABLE
CLIMB 3 TO 5 STEPS WITH RAILING: TOTAL
STANDING UP FROM CHAIR USING ARMS: A LOT
PERSONAL GROOMING: A LOT
MOBILITY SCORE: 8
WALKING IN HOSPITAL ROOM: A LITTLE
SUGGESTED CMS G CODE MODIFIER DAILY ACTIVITY: CL
HELP NEEDED FOR BATHING: A LITTLE

## 2023-01-01 ASSESSMENT — PATIENT HEALTH QUESTIONNAIRE - PHQ9
1. LITTLE INTEREST OR PLEASURE IN DOING THINGS: NOT AT ALL
SUM OF ALL RESPONSES TO PHQ9 QUESTIONS 1 AND 2: 0
1. LITTLE INTEREST OR PLEASURE IN DOING THINGS: NOT AT ALL
2. FEELING DOWN, DEPRESSED, IRRITABLE, OR HOPELESS: NOT AT ALL
1. LITTLE INTEREST OR PLEASURE IN DOING THINGS: NOT AT ALL
2. FEELING DOWN, DEPRESSED, IRRITABLE, OR HOPELESS: NOT AT ALL
2. FEELING DOWN, DEPRESSED, IRRITABLE, OR HOPELESS: NOT AT ALL
SUM OF ALL RESPONSES TO PHQ9 QUESTIONS 1 AND 2: 0
SUM OF ALL RESPONSES TO PHQ9 QUESTIONS 1 AND 2: 0

## 2023-01-01 ASSESSMENT — ENCOUNTER SYMPTOMS
DEPRESSION: 0
BLURRED VISION: 1
ABDOMINAL PAIN: 0
PALPITATIONS: 0
CHILLS: 0
MEMORY LOSS: 1
VOMITING: 0
HEADACHES: 0
MYALGIAS: 0
BRUISES/BLEEDS EASILY: 1
WEAKNESS: 1
CONSTIPATION: 0
LOSS OF CONSCIOUSNESS: 1
NAUSEA: 0
FEVER: 0
FALLS: 1
NERVOUS/ANXIOUS: 0
DIARRHEA: 0
DIZZINESS: 0

## 2023-01-01 ASSESSMENT — FIBROSIS 4 INDEX
FIB4 SCORE: 10.51
FIB4 SCORE: 11.82

## 2023-02-16 NOTE — TELEPHONE ENCOUNTER
Is the patient due for a refill? Yes    Was the patient seen the past year? No    Date of last office visit: 1/6/2022    Does the patient have an upcoming appointment?  Yes   If yes, When? 3/20/2023    Provider to refill:THOMAS    Does the patients insurance require a 100 day supply?  No

## 2023-04-24 NOTE — TELEPHONE ENCOUNTER
Is the patient due for a refill? Yes- final courtesy refill    Was the patient seen the past year? No    Date of last office visit: 1/6/2022    Does the patient have an upcoming appointment?  Yes   If yes, When? 5/11/2023    Provider to refill:THOMAS    Does the patients insurance require a 100 day supply?  No

## 2023-04-27 PROBLEM — M62.82 RHABDOMYOLYSIS: Status: ACTIVE | Noted: 2023-01-01

## 2023-04-27 PROBLEM — S42.009A CLOSED FRACTURE OF CLAVICLE: Status: ACTIVE | Noted: 2023-01-01
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
## 2023-04-27 PROBLEM — R41.82 AMS (ALTERED MENTAL STATUS): Status: RESOLVED | Noted: 2023-01-01 | Resolved: 2023-01-01

## 2023-04-27 PROBLEM — N17.9 AKI (ACUTE KIDNEY INJURY) (HCC): Status: ACTIVE | Noted: 2023-01-01

## 2023-04-27 PROBLEM — R41.82 AMS (ALTERED MENTAL STATUS): Status: ACTIVE | Noted: 2023-01-01

## 2023-04-27 NOTE — ED NOTES
sheela from Lab called with critical result of troponin 134 at 1318. Critical lab result read back to Sheela.   Dr. Ballard notified of critical lab result at 1319.  Critical lab result read back by Dr. Ballard.

## 2023-04-27 NOTE — ED PROVIDER NOTES
ED Provider Note  CHIEF COMPLAINT  Chief Complaint   Patient presents with    T-5000 Head Injury     Pt was found face down by his wife this morning at the bottom of a stair well. LKW 2030 yesterday. Pt does not take blood thinners. Dried blood to the left side of his head.        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  Patient apparently lives alone, although he is still , and his wife was unable to reach him this morning.  She called EMS and the first responders found him at the bottom of a flight of stairs lying on his left side with a pool of blood around his head.  She notes that the patient has chronic memory issues and this was consistent with the patient not remembering what happened and also denying that he was hurt at all.  He denies any pain currently.  He has extensive bruising around the left periorbital area with a laceration to the left eyebrow and extensive amounts of dried blood on the left side of his face and head.  He also has extensive ecchymosis to the left upper chest and clavicular region extending all the way to the deltoid.  He has skin tears and bruising to the left forearm as well.  He has skin tears/breakdown of the skin on both knees but denies any hip or pelvic pain.  He denies any chest pain, shortness of breath, or abdominal pain.  He does note back pain but also notes that that is chronic for him.  EXTERNAL RECORDS REVIEWED  Reviewed last cardiology visit January 2022.  Noted at that time he had CKD 3-4 and a history of CAD with the last PCI in 1999.    ROS *patient denies all review of systems however he is also specifically denying having injured himself.  Unclear reliability to review of systems.  Constitutional: Denies having any recent fevers or chills  Skin: No rashes  HEENT: No sore throat, runny nose  Neck: No neck pain  Chest: No pain or rashes  Pulm: No shortness of breath, cough, wheezing, stridor,  or pain with inspiration/expiration  Gastrointestinal: No nausea, vomiting, diarrhea, constipation, bloating, melena, hematochezia or abdominal pain.  Genitourinary: No dysuria or hematuria  Musculoskeletal: No pain, swelling, or weakness.  Neurologic: No sensory or focal motor changes to extremities. No confusion or disorientation.  Heme: No bleeding or bruising problems.   Immuno: No hx of recurrent infections        LIMITATION TO HISTORY   Altered level of consciousness  OUTSIDE HISTORIAN(S):  None initially however family eventually presented to help with history but nobody knows exactly when he fell or how it happened.        PAST FAM HISTORY  Family History   Problem Relation Age of Onset    Heart Attack Father 60       PAST MEDICAL HISTORY   has a past medical history of Benign essential HTN, CAD (coronary artery disease) (04/09/1999), Chronic kidney disease, stage III (moderate) ([facility]), CKD (chronic kidney disease) stage 3, GFR 30-59 ml/min ([facility]), Hypercholesterolemia, Presence of bare metal stent in left circumflex coronary artery (04/09/1999), Prostate cancer ([facility]), and PVD (peripheral vascular disease) ([facility]).    SOCIAL HISTORY  Social History     Tobacco Use    Smoking status: Never    Smokeless tobacco: Never   Substance and Sexual Activity    Alcohol use: Yes     Alcohol/week: 1.8 - 2.4 oz     Types: 3 - 4 Standard drinks or equivalent per week     Comment: occ    Drug use: No    Sexual activity: Not on file       SURGICAL HISTORY   has a past surgical history that includes other cardiac surgery.    CURRENT MEDICATIONS  Home Medications       Reviewed by Priya Sapp R.N. (Registered Nurse) on 04/27/23 at 1053  Med List Status: Not Addressed     Medication Last Dose Status   ascorbic acid (ASCORBIC ACID) 500 MG Tab  Active   aspirin EC (ECOTRIN) 81 MG TBEC  Active   atorvastatin (LIPITOR) 20 MG Tab  Active   B Complex Vitamins (VITAMIN B COMPLEX) Tab  Active   hydroCHLOROthiazide (HYDRODIURIL) 25  "MG Tab  Active   losartan (COZAAR) 100 MG Tab  Active   losartan (COZAAR) 50 MG Tab  Active   Misc Natural Products (GLUCOSAMINE CHOND CMP DOUBLE PO)  Active   Multiple Vitamins-Minerals (OCUVITE-LUTEIN) Tab  Active   predniSONE (DELTASONE) 1 MG Tab  Active   vitamin D (CHOLECALCIFEROL) 1000 UNIT Tab  Active                     ALLERGIES  No Known Allergies    PHYSICAL EXAM  VITAL SIGNS: BP (!) 160/71   Pulse 74   Temp 36.5 °C (97.7 °F) (Temporal)   Resp 14   Ht 1.727 m (5' 8\")   Wt 72.6 kg (160 lb)   BMI 24.33 kg/m²    Gen: Appears slightly groggy, head deviated somewhat to the right.  Left side of face and head covered in dried blood.  There appears to be a laceration or skin tear to the left periorbital region.  There is blood at the oropharynx and nasopharynx but no active bleeding.  There does not appear to be loose dentition.  HEENT: PERRLA, EOMI.  Pupils are approximately 2 mm and equal.  There is extensive left periorbital ecchymosis but patient cannot open the eye spontaneously.  There does not appear to be any scleral hemorrhage or hyphema.  Notable that the patient is extremely hard of hearing and does not have his hearing aids in.  There are no hematomas but there is a 4 cm irregular laceration to the left posterior parietal region.    Neck:  No tenderness, Supple, No masses  Lymphatic: No cervical lymphadenopathy noted.   Cardiovascular: Regular rate.  Capillary refill less than 3 seconds to all extremities, 2+ distal pulses.  Thorax & Lungs: Normal breath sounds, No respiratory distress, No wheezing bilateral chest rise.  Extensive purplish ecchymosis to the left upper chest, clavicular region, extending over to the lateral portion of the deltoid.  Mild tenderness.  Patient's arm is in a sling.  There is ecchymosis and a skin tear to the left forearm.  Abdomen: Bowel sounds normal, Soft, No tenderness, No masses, No pulsatile masses. No Guarding or rebound.  Small area of purplish ecchymosis to " left lateral abdomen  Skin: Warm, Dry.  Ecchymosis as noted  Back: No bony tenderness, No CVA tenderness.   Extremities: Intact distal pulses, No edema.  Passive range of motion to right upper extremity, and bilateral lower extremities appears normal with no limitation.  There are shallow skin tears/skin breakdown to both knees.  Neurologic: Appears tired, no facial droop, moving all extremities spontaneously and to command.  Good  strength on the right however it is somewhat diminished on the left.  Patient able to plantar and dorsiflex both ankles with equal strength.  Psychiatric: Affect pleasant    INITIAL IMPRESSION  Patient appears to have fallen at some point within the last 12 hours and was unable to get up.  Unclear whether he fell all the way down the stairs or simply at the bottom.  Patient is specifically denying any pain and denying that he actually fell.  He is not able to tell me why he was lying on the ground in a pool of blood and is also disoriented to time.  He knows his own name and the location however.  Unfortunately, the patient also has 1 kidney only and chronic kidney disease, stage III-IV.  Regardless of his kidney function I feel CT imaging with IV contrast is absolutely necessary to rule out life-threatening issues that might be amenable to intervention, even in a 89-year-old who is DNR.  Patient's family is not here to help make decisions yet and I feel the decision has to be made now.  Despite the risks of possible kidney failure, finding and correcting life-threatening internal injury is more important.  Patient will be taken to CT for imaging from his vertex to his pelvis including reformats of the spine.  LABS  Results for orders placed or performed during the hospital encounter of 04/27/23   CBC WITH DIFFERENTIAL   Result Value Ref Range    WBC 8.3 4.8 - 10.8 K/uL    RBC 3.30 (L) 4.70 - 6.10 M/uL    Hemoglobin 10.9 (L) 14.0 - 18.0 g/dL    Hematocrit 32.3 (L) 42.0 - 52.0 %    MCV  97.9 (H) 81.4 - 97.8 fL    MCH 33.0 27.0 - 33.0 pg    MCHC 33.7 33.7 - 35.3 g/dL    RDW 46.4 35.9 - 50.0 fL    Platelet Count 78 (L) 164 - 446 K/uL    MPV 12.5 9.0 - 12.9 fL    Neutrophils-Polys 89.00 (H) 44.00 - 72.00 %    Lymphocytes 4.20 (L) 22.00 - 41.00 %    Monocytes 6.10 0.00 - 13.40 %    Eosinophils 0.00 0.00 - 6.90 %    Basophils 0.20 0.00 - 1.80 %    Immature Granulocytes 0.50 0.00 - 0.90 %    Nucleated RBC 0.00 /100 WBC    Neutrophils (Absolute) 7.38 1.82 - 7.42 K/uL    Lymphs (Absolute) 0.35 (L) 1.00 - 4.80 K/uL    Monos (Absolute) 0.51 0.00 - 0.85 K/uL    Eos (Absolute) 0.00 0.00 - 0.51 K/uL    Baso (Absolute) 0.02 0.00 - 0.12 K/uL    Immature Granulocytes (abs) 0.04 0.00 - 0.11 K/uL    NRBC (Absolute) 0.00 K/uL   PROTHROMBIN TIME   Result Value Ref Range    PT 14.4 12.0 - 14.6 sec    INR 1.13 0.87 - 1.13   APTT   Result Value Ref Range    APTT 25.4 24.7 - 36.0 sec   ARTERIAL BLOOD GAS   Result Value Ref Range    Ph 7.39 (L) 7.40 - 7.50    Pco2 29.7 26.0 - 37.0 mmHg    Po2 54.2 (L) 64.0 - 87.0 mmHg    O2 Saturation 86.7 (L) 93.0 - 99.0 %    Hco3 17 17 - 25 mmol/L    Base Excess -6 (L) -4 - 3 mmol/L    Body Temp 36.5 Centigrade   LACTIC ACID   Result Value Ref Range    Lactic Acid 2.5 (H) 0.5 - 2.0 mmol/L   COMP METABOLIC PANEL   Result Value Ref Range    Sodium 138 135 - 145 mmol/L    Potassium 3.9 3.6 - 5.5 mmol/L    Chloride 104 96 - 112 mmol/L    Co2 18 (L) 20 - 33 mmol/L    Anion Gap 16.0 7.0 - 16.0    Glucose 160 (H) 65 - 99 mg/dL    Bun 57 (H) 8 - 22 mg/dL    Creatinine 2.33 (H) 0.50 - 1.40 mg/dL    Calcium 8.5 8.5 - 10.5 mg/dL    AST(SGOT) 40 12 - 45 U/L    ALT(SGPT) 17 2 - 50 U/L    Alkaline Phosphatase 58 30 - 99 U/L    Total Bilirubin 0.5 0.1 - 1.5 mg/dL    Albumin 3.1 (L) 3.2 - 4.9 g/dL    Total Protein 5.4 (L) 6.0 - 8.2 g/dL    Globulin 2.3 1.9 - 3.5 g/dL    A-G Ratio 1.3 g/dL   PLATELET MAPPING WITH BASIC TEG   Result Value Ref Range    Reaction Time Initial-R 1.8 (L) 4.6 - 9.1 min    React  Time Initial Hep 2.0 (L) 4.3 - 8.3 min    Clot Kinetics-K 1.6 0.8 - 2.1 min    Clot Angle-Angle 71.9 63.0 - 78.0 degrees    Maximum Clot Strength-MA 58.8 52.0 - 69.0 mm    TEG Functional Fibrinogen(MA) 18.9 15.0 - 32.0 mm    % Inhibition ADP 38.2 (H) 0.0 - 17.0 %    % Inhibition AA 81.2 (H) 0.0 - 11.0 %    TEG Algorithm Link Algorithm    CREATINE KINASE   Result Value Ref Range    CPK Total 1199 (H) 0 - 154 U/L   TROPONIN   Result Value Ref Range    Troponin T 134 (H) 6 - 19 ng/L   CORRECTED CALCIUM   Result Value Ref Range    Correct Calcium 9.2 8.5 - 10.5 mg/dL   ESTIMATED GFR   Result Value Ref Range    GFR (CKD-EPI) 25 (A) >60 mL/min/1.73 m 2   EKG (NOW)   Result Value Ref Range    Report       [facility] Emergency Dept.    Test Date:  2023  Pt Name:    SAMM ATKINS                   Department: ER  MRN:        [id number]                      Room:       [facility]  Gender:     Male                         Technician: [id number]  :        [id number]                   Requested By:ELVIA SHEARER  Order #:    [id number]                    Reading MD:    Measurements  Intervals                                Axis  Rate:       81                           P:          76  MD:         175                          QRS:        -86  QRSD:       153                          T:          20  QT:         463  QTc:        538    Interpretive Statements  Sinus rhythm  Ventricular premature complex  RBBB and LAFB  Compared to ECG 2021 11:28:35  Ventricular premature complex(es) now present  Left anterior fascicular block now present  Right bundle-branch block now present       I have independently interpreted this EKG  Sinus rhythm with a rate of 81, QTc prolonged at 538, there are scattered ventricular premature complexes.  Right bundle branch block present.  Compared to most recent EKG performed in 2021, right bundle branch block is present.  Notable that on other previous EKGs patient had  intermittent right bundle branch block present.  RADIOLOGY  DX-FOREARM LEFT   Final Result      No radiographic evidence of acute traumatic injury.      DX-HUMERUS 2+ LEFT   Final Result      1.  Partially visualized comminuted left distal third clavicle fracture.   2.  No acute fracture or malalignment of the humerus.      CT-LSPINE W/O PLUS RECONS   Final Result      Degenerative changes of the lumbar spine without acute fracture or malalignment.      CT-TSPINE W/O PLUS RECONS   Final Result      Degenerative changes of the thoracic spine without acute fracture or malalignment.      CT-CHEST,ABDOMEN,PELVIS WITH   Final Result      1.  Acute, comminuted fracture of the left clavicle near the acromioclavicular joint. No associated rib fractures. No pneumothorax.   2.  No other traumatic injury in the chest, abdomen or pelvis.   3.  A few areas of linear atelectasis/scarring in the lungs.   4.  Borderline enlarged mediastinal lymph nodes, statistically reactive.   5.  Cholelithiasis.   6.  Severe left hydronephrosis with severe left renal cortical atrophy.   7.  Severe left hydroureter, with a 3 mm distal ureteral stone.   8.  Small colonic diverticula.      CT-MAXILLOFACIAL W/O PLUS RECONS   Final Result      Left periorbital soft tissue swelling without underlying acute fracture or malalignment.      CT-HEAD W/O   Final Result      1.  No evidence of acute territorial infarct, intracranial hemorrhage or mass lesion.   2.  Left periorbital and left parietal scalp soft tissue swelling and hematoma.   3.  Moderate diffuse cerebral substance loss.   4.  Moderate microangiopathic ischemic change versus demyelination or gliosis.         CT-CSPINE WITHOUT PLUS RECONS    (Results Pending)     I have independently interpreted the diagnostic imaging associated with this visit and am waiting the final reading from the radiologist.   My preliminary interpretation is a follows:   2 view left humerus: Humeral head appears  intact, there are portions of the distal clavicle fracture which are visible.  Diaphysis and distal portion of the humerus appear intact as well.  2 view of the left forearm: No bony abnormalities noted.    HYDRATION: Based on the patient's presentation of Dehydration, Eldery ALOC, and Inability to take oral fluids the patient was given IV fluids. IV Hydration was used because oral hydration was not adequate alone. Upon recheck following hydration, the patient was stable.    Laceration Repair Procedure Note    Indication: Laceration    Procedure: The patient was placed in the appropriate position and anesthesia around the laceration was obtained by infiltration using 4.0 cc of 2% Lidocaine with epinephrine. The area was then cleansed using betadine, irrigated with normal saline, explored with no foreign bodies discovered, and draped in a sterile fashion. The laceration was closed with staples. There were no additional lacerations requiring repair. The wound area was then dressed with a bandage.      Total repaired wound length: 4 cm.     Other Items: Staple count: 7    The patient tolerated the procedure well.    Complications: None     Critical Care Note  Upon my evaluation, this patient had high probability of imminent and life-threatening deterioration due to closed head injury with delirium versus dementia and rhabdomyolysis with chronic kidney disease, which required my direct attention, intervention, and personal management. I personally provided 30 minutes of critical care time exclusive of time spent on separately billable procedures. Time includes review of laboratory data, radiology results, discussion with consultants, and monitoring for potential decompensation.      COURSE & MEDICAL DECISION MAKING  Pertinent Labs & Imaging studies reviewed. (See chart for details)  ED observation? no  12 PM  Patient reevaluated at bedside and his family happens to be there as well.  They state understanding that there  do not appear to be any significant head injuries or internal injuries however his clavicle fracture will limit his mobility even further than it already is.  Likely he will need admission for this alone and will benefit from PT OT while he is in.  Unclear whether the the patient will be admitted to trauma or the medical service at this point.  Patient is noted to have a right bundle branch block today which appears new from the previous EKG however on review of other past EKGs there has been intermittent right bundle branch block demonstrated from time to time.  He does have an elevated troponin which may be the result of his chronic kidney disease or he could have had a cardiac event.  1:20 PM  Case discussed briefly with Dr. Mcarthur, orthopedic surgeon, who will evaluate the patient in the hospital.    1:30 PM  Case discussed briefly with trauma surgeon who agreed that the patient is appropriate for admission to the [facility] as the majority of his issues seem to be medical.     1:40 PM  Case discussed with the [facility] resident who will evaluate the patient in the emergency department for admission.    I have discussed management of the patient with the following physicians and FATOUMATA's: Orthopedic surgeon, trauma surgeon, family practice resident    Escalation of care considered, and ultimately not performed: None    Barriers to care at this time, including but not limited to: Dementia versus delirium.     Decision tools and Rx drugs considered including, but not limited to : None    Discussion of management with other QHP or appropriate source(s): None    FINAL IMPRESSION  1. Closed head injury, initial encounter    2. Scalp laceration, initial encounter    3. Periorbital contusion of left eye, initial encounter    4. Closed displaced fracture of acromial end of left clavicle, initial encounter    5. Traumatic rhabdomyolysis, initial encounter ([facility])    6. Fall, initial encounter    7. Right bundle  branch block        Electronically signed by: Segundo Ballard M.D., 4/27/2023 11:15 AM

## 2023-04-27 NOTE — ED NOTES
Med Rec complete per patient  No oral antibiotics in the last 30 days per patient  Allergies reviewed  Preferred [facility]: [facility]

## 2023-04-27 NOTE — PROGRESS NOTES
4 Eyes Skin Assessment Completed by adriana Stephenson, ALFONZO and ALFONZO Ram.    Head Swelling  Ears WDL  Nose WDL  Mouth WDL  Neck WDL  Breast/Chest Bruising  Shoulder Blades Redness  Spine WDL  (R) Arm/Elbow/Hand WDL  (L) Arm/Elbow/Hand Redness and Bruising  Abdomen WDL  Groin WDL  Scrotum/Coccyx/Buttocks WDL  (R) Leg Redness  (L) Leg Redness  (R) Heel/Foot/Toe WDL  (L) Heel/Foot/Toe WDL          Devices In Places Tele Box and Pulse Ox      Interventions In Place Waffle Overlay    Possible Skin Injury No    Pictures Uploaded Into Epic N/A  Wound Consult Placed N/A  RN Wound Prevention Protocol Ordered No

## 2023-04-27 NOTE — H&P
[facility] MEDICINE HISTORY AND PHYSICAL     PATIENT ID:  NAME:  Luis Antonio Turner  MRN:               [id number]  YOB: 1928    Date of Admission: 4/27/2023     Attending: Dr. Brad Wade    Resident: Lina Rankin MD     Primary Care Physician:  Luis Antonio Turner D.O.    CC:  Fall    HPI: Luis Antonio Turner is a 89 y.o. male who was found down by his wife. The patient is known to be hard of hearing with hearing aids, which he does not have with him today. He uses a cane to walk, however, usually just leans on different objects in his home for balance. He takes losartan PRN for high blood pressure.     The patient's son said the patient was found down by his wife near the staircase. It is unclear whether the patient fell near or on the stair case as the fall was unwitnessed. The patient was unable to recall the events leading up to the fall and unable to recall the fall himself. The patient's family anticipates the patient was down on the floor for approximately an hour. The patient reported having a recent viral bronchitis that he has been treating with vitamin C. 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 Over the last 6 months to 1 year is cognition has declined.     ERCourse:  In the ED the patient was evaluated for a fall. He underwent imaging of his head, face, C/T/L spine, forearm and humerus. He was found to have a clavicular fracture. He was placed in a sling. Trauma surgery and orthopedic surgery were consulted from the emergency department. Orthopedic surgery to see the patient. The patient will be admitted for fracture management and safe discharge planning.     REVIEW OF SYSTEMS:   Ten systems reviewed and were negative except as noted in the HPI.                PAST MEDICAL HISTORY:  Past Medical History:   Diagnosis Date    Benign essential HTN     CAD (coronary artery disease) 04/09/1999    LCx STEMI    Chronic kidney disease,  stage III (moderate) ([facility])     CKD (chronic kidney disease) stage 3, GFR 30-59 ml/min ([facility])     Hypercholesterolemia     Presence of bare metal stent in left circumflex coronary artery 04/09/1999    4 mm Duet    Prostate cancer ([facility])     PVD (peripheral vascular disease) ([facility])     Bilateral common iliac stenosis, left worse than right       PAST SURGICAL HISTORY:  Past Surgical History:   Procedure Laterality Date    OTHER CARDIAC SURGERY      angioplasty and stent of the proximal circumflex coronary artery for acute MI       FAMILY HISTORY:  Family History   Problem Relation Age of Onset    Heart Attack Father 60       SOCIAL HISTORY:   Social History:   Tobacco: Denied  Alcohol: Social, and to help sleep. Unclear how often for sleep.  Recreational drugs (illegal and prescription): Denied  Employment: Retired internal medicine physician  Living situation: Lives with his wife  Recent travel:  Denies.  Primary Care Provider: Luis Antonio Turner D.O.    DIET:   No orders of the defined types were placed in this encounter.      ALLERGIES:  Allergies   Allergen Reactions    Nsaids Unspecified     Unable to take due to kidney       OUTPATIENT MEDICATIONS:    Current Facility-Administered Medications:     iohexol (OMNIPAQUE) 350 mg/mL (IV), 100 mL, Intravenous, Once, Segundo Ballard M.D.    senna-docusate (PERICOLACE or SENOKOT S) 8.6-50 MG per tablet 2 Tablet, 2 Tablet, Oral, BID **AND** polyethylene glycol/lytes (MIRALAX) PACKET 1 Packet, 1 Packet, Oral, QDAY PRN **AND** magnesium hydroxide (MILK OF MAGNESIA) suspension 30 mL, 30 mL, Oral, QDAY PRN **AND** bisacodyl (DULCOLAX) suppository 10 mg, 10 mg, Rectal, QDAY PRN, Lina Rankin M.D.    labetalol (NORMODYNE/TRANDATE) injection 10 mg, 10 mg, Intravenous, Q4HRS PRN, Lina Rankin M.D.    ondansetron (ZOFRAN) syringe/vial injection 4 mg, 4 mg, Intravenous, Q4HRS PRN, Lina Rankin M.D.    ondansetron (ZOFRAN ODT) dispertab 4 mg, 4 mg, Oral, Q4HRS PRN, Lina BRASHER  "ABILIO Rankin    acetaminophen (Tylenol) tablet 650 mg, 650 mg, Oral, Q6HRS PRN, Lina Rankin M.D.    NS infusion, , Intravenous, Continuous, Lina Rankin M.D.    morphine 4 MG/ML injection 1 mg, 1 mg, Intravenous, Q3HRS PRN, Lina Rankin M.D.    PHYSICAL EXAM:  Vitals:    23 1500 23 1541 23 1542 23 1543   BP: (!) 156/70 (!) 154/62     Pulse: 84 75     Resp: 16 18     Temp:  36.6 °C (97.8 °F)     TempSrc:  Temporal     SpO2: 96% 97%     Weight:    62.7 kg (138 lb 3.7 oz)   Height:   1.727 m (5' 8\")    , Temp (24hrs), Av.6 °C (97.8 °F), Min:36.5 °C (97.7 °F), Max:36.6 °C (97.8 °F)  , Pulse Oximetry: 97 %, O2 (LPM): 0, O2 Delivery Device: None - Room Air    General: Pt resting in NAD, cooperative   Skin:  There was outward signs of bruising present on his left anterior shoulder and left face.   HEENT: NC/AT. PERRL. EOMI. MMM. No nasal discharge.   Lungs:  Symmetrical.  CTAB with no W/R/R.  Good air movement   Cardiovascular:  S1/S2 RRR without murmurs  Abdomen:  Abdomen is soft, nontender, nondistended. +BS. No masses noted.  Extremities:  Left shoulder was in a sling.   Spine:  Straight without vertebral anomalies.  CNS:  Muscle tone is normal. No gross focal neurologic deficits      LAB TESTS:   Recent Labs     23  1117   WBC 8.3   RBC 3.30*   HEMOGLOBIN 10.9*   HEMATOCRIT 32.3*   MCV 97.9*   MCH 33.0   RDW 46.4   PLATELETCT 78*   MPV 12.5   NEUTSPOLYS 89.00*   LYMPHOCYTES 4.20*   MONOCYTES 6.10   EOSINOPHILS 0.00   BASOPHILS 0.20     Recent Labs     23  1117   CPKTOTAL 1199*     Recent Labs     23  1117   SODIUM 138   POTASSIUM 3.9   CHLORIDE 104   CO2 18*   BUN 57*   CREATININE 2.33*   CALCIUM 8.5   ALBUMIN 3.1*       CULTURES:   Results       Procedure Component Value Units Date/Time    CoV-2, Flu A/B, And RSV by PCR ([employer]) [[id number]] Collected: 23 1327    Order Status: Completed Specimen: Respirate from [facility] Updated: 23 1447     " Influenza virus A RNA Negative     Influenza virus B, PCR Negative     RSV, PCR Negative     SARS-CoV-2 by PCR NotDetected     Comment: [facility] providers: PLEASE REFER TO DE-ESCALATION AND RETESTING PROTOCOL  on [email or web address]    **The [employer] GeneXpert Xpress SARS-CoV-2 RT-PCR Test has been made  available for use under the Emergency Use Authorization (EUA) only.          SARS-CoV-2 Source NP Swab    URINALYSIS (UA) [[id number]]  (Abnormal) Collected: 04/27/23 1336    Order Status: Completed Specimen: Urine Updated: 04/27/23 1411     Color Yellow     Character Clear     Specific Gravity 1.033     Ph 5.0     Glucose Negative mg/dL      Ketones Negative mg/dL      Protein 30 mg/dL      Bilirubin Negative     Urobilinogen, Urine 0.2     Nitrite Negative     Leukocyte Esterase Negative     Occult Blood Trace     Micro Urine Req Microscopic            IMAGES:  DX-FOREARM LEFT   Final Result      No radiographic evidence of acute traumatic injury.      DX-HUMERUS 2+ LEFT   Final Result      1.  Partially visualized comminuted left distal third clavicle fracture.   2.  No acute fracture or malalignment of the humerus.      CT-LSPINE W/O PLUS RECONS   Final Result      Degenerative changes of the lumbar spine without acute fracture or malalignment.      CT-TSPINE W/O PLUS RECONS   Final Result      Degenerative changes of the thoracic spine without acute fracture or malalignment.      CT-CHEST,ABDOMEN,PELVIS WITH   Final Result      1.  Acute, comminuted fracture of the left clavicle near the acromioclavicular joint. No associated rib fractures. No pneumothorax.   2.  No other traumatic injury in the chest, abdomen or pelvis.   3.  A few areas of linear atelectasis/scarring in the lungs.   4.  Borderline enlarged mediastinal lymph nodes, statistically reactive.   5.  Cholelithiasis.   6.  Severe left hydronephrosis with severe left renal cortical atrophy.   7.  Severe left hydroureter, with a 3 mm distal ureteral stone.    8.  Small colonic diverticula.      CT-MAXILLOFACIAL W/O PLUS RECONS   Final Result      Left periorbital soft tissue swelling without underlying acute fracture or malalignment.      CT-CSPINE WITHOUT PLUS RECONS   Final Result      1.  Degenerative changes of the cervical spine without acute fracture or malalignment.   2.  Extensive stranding in the left inferior neck, supraclavicular fossa and left chest wall consistent with contusion/hematoma.      CT-HEAD W/O   Final Result      1.  No evidence of acute territorial infarct, intracranial hemorrhage or mass lesion.   2.  Left periorbital and left parietal scalp soft tissue swelling and hematoma.   3.  Moderate diffuse cerebral substance loss.   4.  Moderate microangiopathic ischemic change versus demyelination or gliosis.             CONSULTS:   Trauma Surgery  Orthopedic Surgery    ASSESSMENT/PLAN:   89 y.o. male admitted for elevated CPK and KRISTY following an unwitnessed fall.     Fall  Assessment & Plan  The patient is unable to recall events leading up to the fall and the fall itself. The patient's son said the patient has a cane, however, usually uses objects in his house to lean on. CT Head negative for bleed. CT C/T/L negative for fracture. Clavicle fracture present.   - Will discuss goals of care with patient and family in AM  - Orthopedic surgery consult placed. Will appreciate recommendations.  - PT/OT    Rhabdomyolysis  Assessment & Plan  CPK elevated in the ED today. Will monitor for fluid overload, electrolyte abnormalities, acidemia, and uremia.   - Continue IVF  - Will reassess in AM    KRISTY (acute kidney injury) (HCC)  Assessment & Plan  - Continue IVF  - Hold nephrotoxic agents  - Reassess in AM    Closed fracture of clavicle  Assessment & Plan  Present on imaging today.  - Sling and intermittent ice pack for comfort  - Tylenol PRN for pain. Hold motrin for KRISTY.  - PRN Morphine available for severe pain   - Orthopedic surgery consulted from ED.  Will appreciate recommendations.       Hypercholesterolemia- (present on admission)  Assessment & Plan  Unclear whether he continues this medication at home. Likely minimal benefit of resuming a statin in a 89 yo male.       Core Measures:   Fluids: NS @ 150 mL/HR  Lines: PIV  Abx: None  DVT prophylaxis: SCD  Code Status: DNR/DNI      Lina Rankin MD  PGY-2  [facility]      This case was discussed with Dr. Brad Wade

## 2023-04-27 NOTE — ED TRIAGE NOTES
Chief Complaint   Patient presents with    T-5000 Head Injury     Pt was found face down by his wife this morning at the bottom of a stair well. LKW 2030 yesterday. Pt does not take blood thinners. Dried blood to the left side of his head.      Pt is a code TBI.

## 2023-04-27 NOTE — H&P
TRAUMA HISTORY AND PHYSICAL    CHIEF COMPLAINT: Fall related trauma.    HISTORY OF PRESENT ILLNESS: The patient is a 89  year old male who fell.  Head strike.  Down for about 8 hours.  Single kidney.  No intracranial hemorrhage.       PAST MEDICAL HISTORY:  has a past medical history of Benign essential HTN, CAD (coronary artery disease) (04/09/1999), Chronic kidney disease, stage III (moderate) ([facility]), CKD (chronic kidney disease) stage 3, GFR 30-59 ml/min ([facility]), Hypercholesterolemia, Presence of bare metal stent in left circumflex coronary artery (04/09/1999), Prostate cancer ([facility]), and PVD (peripheral vascular disease) ([facility]).     PAST SURGICAL HISTORY:  has a past surgical history that includes other cardiac surgery.     ALLERGIES: No Known Allergies     CURRENT MEDICATIONS:   Home Medications       Reviewed by Priya Sapp R.N. (Registered Nurse) on 04/27/23 at 1053  Med List Status: Not Addressed     Medication Last Dose Status   ascorbic acid (ASCORBIC ACID) 500 MG Tab  Active   aspirin EC (ECOTRIN) 81 MG TBEC  Active   atorvastatin (LIPITOR) 20 MG Tab  Active   B Complex Vitamins (VITAMIN B COMPLEX) Tab  Active   hydroCHLOROthiazide (HYDRODIURIL) 25 MG Tab  Active   losartan (COZAAR) 100 MG Tab  Active   losartan (COZAAR) 50 MG Tab  Active   Misc Natural Products (GLUCOSAMINE CHOND CMP DOUBLE PO)  Active   Multiple Vitamins-Minerals (OCUVITE-LUTEIN) Tab  Active   predniSONE (DELTASONE) 1 MG Tab  Active   vitamin D (CHOLECALCIFEROL) 1000 UNIT Tab  Active                    FAMILY HISTORY:   Family History   Problem Relation Age of Onset    Heart Attack Father 60        SOCIAL HISTORY:   Social History     Tobacco Use    Smoking status: Never    Smokeless tobacco: Never   Substance and Sexual Activity    Alcohol use: Yes     Alcohol/week: 1.8 - 2.4 oz     Types: 3 - 4 Standard drinks or equivalent per week     Comment: occ    Drug use: No    Sexual activity: Not on file       REVIEW OF SYSTEMS:  "Comprehensive review of systems is negative with the   exception of the aforementioned HPI, PMH, and PSH elements in accordance with CMS guidelines.     PHYSICAL EXAMINATION:     GENERAL: No distress  VITAL SIGNS: BP (!) 160/71   Pulse 74   Temp 36.5 °C (97.7 °F) (Temporal)   Resp 14   Ht 1.727 m (5' 8\")   Wt 72.6 kg (160 lb)   HEAD AND NECK: Pupils:  Equal and Reactive  Dentition: Occlusion is adequate  Facial:  Periorbital bruising. Left.  Eyebrow laceration.    NECK: No JVD. Trachea midline. Cervical tenderness is  absent   CHEST: Breath sounds are equal. No sternal tenderness.  No  lateral rib tenderness.  CARDIOVASCULAR: Regular rhythm  ABDOMEN: Soft, no tenderness guarding or peritoneal findings.   PELVIS: Stable.  EXTREMITIES: Examination of the upper and lower extremities : left forearm bruised  BACK: No midline stepoffs.  No Tenderness  NEUROLOGIC: No gross motor or sensory deficit. GCS 14    LABORATORY VALUES:   Recent Labs     04/27/23  1117   WBC 8.3   RBC 3.30*   HEMOGLOBIN 10.9*   HEMATOCRIT 32.3*   MCV 97.9*   MCH 33.0   MCHC 33.7   RDW 46.4   PLATELETCT 78*   MPV 12.5     Recent Labs     04/27/23  1117   SODIUM 138   POTASSIUM 3.9   CHLORIDE 104   CO2 18*   GLUCOSE 160*   BUN 57*   CREATININE 2.33*   CALCIUM 8.5     Recent Labs     04/27/23  1117 04/27/23  1139   ASTSGOT 40  --    ALTSGPT 17  --    TBILIRUBIN 0.5  --    ALKPHOSPHAT 58  --    GLOBULIN 2.3  --    INR  --  1.13     Recent Labs     04/27/23  1139   APTT 25.4   INR 1.13        IMAGING:   DX-FOREARM LEFT   Final Result      No radiographic evidence of acute traumatic injury.      DX-HUMERUS 2+ LEFT   Final Result      1.  Partially visualized comminuted left distal third clavicle fracture.   2.  No acute fracture or malalignment of the humerus.      CT-LSPINE W/O PLUS RECONS   Final Result      Degenerative changes of the lumbar spine without acute fracture or malalignment.      CT-TSPINE W/O PLUS RECONS   Final Result    "   Degenerative changes of the thoracic spine without acute fracture or malalignment.      CT-CHEST,ABDOMEN,PELVIS WITH   Final Result      1.  Acute, comminuted fracture of the left clavicle near the acromioclavicular joint. No associated rib fractures. No pneumothorax.   2.  No other traumatic injury in the chest, abdomen or pelvis.   3.  A few areas of linear atelectasis/scarring in the lungs.   4.  Borderline enlarged mediastinal lymph nodes, statistically reactive.   5.  Cholelithiasis.   6.  Severe left hydronephrosis with severe left renal cortical atrophy.   7.  Severe left hydroureter, with a 3 mm distal ureteral stone.   8.  Small colonic diverticula.      CT-MAXILLOFACIAL W/O PLUS RECONS   Final Result      Left periorbital soft tissue swelling without underlying acute fracture or malalignment.      CT-HEAD W/O   Final Result      1.  No evidence of acute territorial infarct, intracranial hemorrhage or mass lesion.   2.  Left periorbital and left parietal scalp soft tissue swelling and hematoma.   3.  Moderate diffuse cerebral substance loss.   4.  Moderate microangiopathic ischemic change versus demyelination or gliosis.         CT-CSPINE WITHOUT PLUS RECONS    (Results Pending)       IMPRESSION AND PLAN:  Advanced age.  No intracranial hemorrhage.  Clavicle fracture. No rib fractures or pneumothorax.   Patient best served on medicine.    In general not an operative candidate.        ____________________________________   Micheal Kirkpatrick MD, FACS      DD: 4/27/2023   DT: 1:26 PM

## 2023-04-27 NOTE — CARE PLAN
The patient is Stable - Low risk of patient condition declining or worsening         Progress made toward(s) clinical / shift goals:  rest, pain controlled    Patient is not progressing towards the following goals:

## 2023-04-28 PROBLEM — E87.20 LACTIC ACIDOSIS: Status: ACTIVE | Noted: 2023-01-01

## 2023-04-28 PROBLEM — I10 HYPERTENSION: Status: ACTIVE | Noted: 2023-01-01

## 2023-04-28 PROBLEM — R79.89 ELEVATED TROPONIN: Status: ACTIVE | Noted: 2023-01-01

## 2023-04-28 NOTE — THERAPY
"Physical Therapy   Initial Evaluation     Patient Name: Luis Antonio Turner MD  Age:  89 y.o., Sex:  male  Medical Record #: [id number]  Today's Date: 4/28/2023     Precautions  Precautions: Fall Risk;Sling Left Upper Extremity  Comments: L clavicle fx, no WB restrictions ordered, maintained NWB in sling    Assessment  Patient is 89 y.o. male admitted after found down by his wife near the staircase, found to have L clavicle fracture (ortho consult pending?) and rhabdomyolysis.  PMH includes HTN, CAD, and hypercholesterolemia.  Today patient was seen for PT evaluation, presented with impaired strength, balance, activity tolerance, safety awareness, and coordination.  No LUE WB restrictions ordered in chart, maintained LUE NWB with sling for safety/comfort.  He required mod A for all functional mobility including short gait EOB <> toilet.  Unable to obtain accurate PLOF as patient is quite [place] despite hearing aids.  He would benefit from continued mobility with nursing staff including sitting EOB/in chair for 3 meals/day and mobilizing to toilet.  Will continue to follow, recommend post acute placement.     Plan    Physical Therapy Initial Treatment Plan   Treatment Plan : Bed Mobility, Equipment, Gait Training, Neuro Re-Education / Balance, Self Care / Home Evaluation, Stair Training, Therapeutic Activities, Therapeutic Exercise  Treatment Frequency: 3 Times per Week  Duration: Until Therapy Goals Met    DC Equipment Recommendations: Unable to determine at this time  Discharge Recommendations: Recommend post-acute placement for additional physical therapy services prior to discharge home      Subjective    \"I need to talk to my son about the [employer] draft.\"     Objective     04/28/23 1001   Precautions   Precautions Fall Risk;Sling Left Upper Extremity   Comments L clavicle fx, no WB restrictions ordered, maintained NWB in sling   Pain 0 - 10 Group   Therapist Pain Assessment Post Activity Pain Same as Prior to " Activity;Nurse Notified;0   Prior Living Situation   Prior Services None   Housing / Facility 2 Story House   Steps In Home (flight of stairs)   Equipment Owned Single Point Cane   Lives with - Patient's Self Care Capacity Spouse   Comments Difficult to obtain information as pt is very [place] despite hearing aids   Prior Level of Functional Mobility   Bed Mobility Independent   Transfer Status Independent   Ambulation Independent   Assistive Devices Used Single Point Cane   Stairs Independent   History of Falls   History of Falls Yes   Cognition    Cognition / Consciousness X   Speech/ Communication Hard of Hearing;Delayed Responses   Level of Consciousness Alert   Safety Awareness Impaired   New Learning Impaired   Attention Impaired   Comments Pleasant & cooperative, very [place] despite hearing aids   Active ROM Lower Body    Active ROM Lower Body  WDL   Strength Lower Body   Comments WFL with functional mobility   Sensation Lower Body   Comments Unable to assess   Balance Assessment   Sitting Balance (Static) Poor   Sitting Balance (Dynamic) Poor -   Standing Balance (Static) Trace +   Standing Balance (Dynamic) Trace +   Weight Shift Sitting Poor   Weight Shift Standing Poor   Bed Mobility    Supine to Sit Moderate Assist   Sit to Supine Moderate Assist   Scooting Moderate Assist   Gait Analysis   Gait Level Of Assist Moderate Assist   Assistive Device Hand Held Assist   Distance (Feet) 6   # of Times Distance was Traveled 2   Deviation (narrow [place])   Weight Bearing Status No restrictions   Functional Mobility   Sit to Stand Moderate Assist   Bed, Chair, Wheelchair Transfer Moderate Assist   Transfer Method Stand Step   Activity Tolerance   Sitting in Chair 8 min + on toilet   Sitting Edge of Bed < 5 min   Standing 5 min total   Edema / Skin Assessment   Comments L shoulder bruising, scalp lac   Short Term Goals    Short Term Goal # 1 Pt will perform supine <> sit without bed features with SPV in 6 visits to progress  bed mobility   Short Term Goal # 2 Pt will perform STS/functional transfers with LRAD and SPV in 6 visits to progress OOB mobility   Short Term Goal # 3 Pt will ambulate 100 ft+ with LRAD and SPV in 6 visits to progress functional ambulation   Short Term Goal # 4 Pt will negotiate 3 stairs with min A in 6 visits to initiate stair training

## 2023-04-28 NOTE — CARE PLAN
The patient is Stable - Low risk of patient condition declining or worsening    Shift Goals  Clinical Goals: pain control, rest, PT/OT  Patient Goals: pain control, rest      Problem: Knowledge Deficit - Standard  Goal: Patient and family/care givers will demonstrate understanding of plan of care, disease process/condition, diagnostic tests and medications  Outcome: Progressing     Problem: Pain - Standard  Goal: Alleviation of pain or a reduction in pain to the patient’s comfort goal  Outcome: Progressing     Problem: Fall Risk  Goal: Patient will remain free from falls  Outcome: Progressing

## 2023-04-28 NOTE — THERAPY
Occupational Therapy   Initial Evaluation     Patient Name: Luis Antonio Turner MD  Age:  89 y.o., Sex:  male  Medical Record #: [id number]  Today's Date: 4/28/2023     Precautions  Precautions: Fall Risk, Sling Left Upper Extremity  Comments: L clavicle fx, no WB restrictions ordered, maintained NWB in sling    Assessment  Patient is 89 y.o. male with a diagnosis of L clavicle Fx.   Pt currently limited by decreased functional mobility, activity tolerance, cognition, strength, AROM, coordination, balance, and pain which are affecting pt's ability to complete ADLs/IADLs at baseline. Pt would benefit from OT services in the acute care setting to maximize functional recovery.      Plan    Occupational Therapy Initial Treatment Plan   Treatment Interventions: (P) Self Care / Activities of Daily Living, Therapeutic Activity  Treatment Frequency: (P) 3 Times per Week  Duration: (P) Until Therapy Goals Met       Discharge Recommendations: (P) Recommend post-acute placement for additional occupational therapy services prior to discharge home        04/28/23 1005   Prior Living Situation   Prior Services None   Housing / Facility 2 Story House   Steps In Home 13   Equipment Owned Single Point Cane   Lives with - Patient's Self Care Capacity Spouse   Prior Level of ADL Function   Self Feeding Independent   Grooming / Hygiene Independent   Bathing Independent   Dressing Independent   ADL Assessment   Grooming Supervision   Upper Body Dressing Maximal Assist   Lower Body Dressing Maximal Assist   Toileting Maximal Assist   Functional Mobility   Sit to Stand Moderate Assist   Bed, Chair, Wheelchair Transfer Moderate Assist   Short Term Goals   Short Term Goal # 1 min A with UB dressing   Short Term Goal # 2 min A with LB dressing   Short Term Goal # 3 SBA with ADL txfs   Occupational Therapy Initial Treatment Plan    Treatment Interventions Self Care / Activities of Daily Living;Therapeutic Activity   Treatment Frequency 3 Times  per Week   Duration Until Therapy Goals Met   Anticipated Discharge Equipment and Recommendations   Discharge Recommendations Recommend post-acute placement for additional occupational therapy services prior to discharge home

## 2023-04-28 NOTE — CARE PLAN
The patient is Stable - Low risk of patient condition declining or worsening    Shift Goals  Clinical Goals: pain control, rest, PT/OT  Patient Goals: pain control, rest    Progress made toward(s) clinical / shift goals:        Problem: Knowledge Deficit - Standard  Goal: Patient and family/care givers will demonstrate understanding of plan of care, disease process/condition, diagnostic tests and medications  Outcome: Progressing     Problem: Pain - Standard  Goal: Alleviation of pain or a reduction in pain to the patient’s comfort goal  Outcome: Progressing     Problem: Fall Risk  Goal: Patient will remain free from falls  Outcome: Progressing

## 2023-04-28 NOTE — PROGRESS NOTES
This note is intended for the purposes of medical student education and feedback only.   Please refer to the documentation by this patient's assigned medical practitioner for details of care and plans.    Medical Student Admitting History and Physical  Note Author: Wilmer Noguera, Student MS4  Date: 4/28/2023    Date of Admission: 4/27/2023  Primary Team: Team A  Attending: Dr. Wade  Senior Resident: Dr. Rankin  Intern: Dr. 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    SUBJECTIVE  HPI  Pt's wife found Pt at the bottom of the stair well this morning around [id number]. Pt's son speculates Pt was on the ground for 1-2 hours as the Pt was dressed up for the morning when he was found. Last known normal was 2030 yesterday. No witnesses to the fall - unclear if he fell from the top or bottom of the stair case. Pt reports not remembering the fall or the event prior to the fall. Pt last recalls wife's voice calling him when he was on the ground. Denies fall and LOC in the past. Denies prodrome prior to the fall including dizziness, lightheadedness, CP or palpitations. Pt does not take any blood thinners. Pt does ambulate with a cane and reports the house is well lit and leans on different objects around the home for balance. Pt lives alone in a 2-story house.     Pt does wear a hearing aid and was not wearing one at the time of the visit. Pt reports a recent viral bronchitis a/w cough that he's treating with vitamin C. 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 Over the last 6 months to 1 year is cognition has declined.      Denies h/o seizures, previous falls, osteoporosis, previous, fractures, or strokes.    ER course  In the ED the patient was  "evaluated for a fall. He underwent imaging of his head, face, C/T/L spine, forearm and humerus. He was found to have a clavicular fracture. He was placed in a sling. Trauma surgery and orthopedic surgery were consulted from the emergency department. Orthopedic surgery to see the patient. The patient will be admitted for fracture management and safe discharge planning.     Interval history  No acute events overnight. Pt reports sleeping \"for the past 24 hours\" after administration of morphine. Per chart, morphine was last administered 0143 today, 4/28/23.    Patient reports L shoulder and arm pain but is able to tolerate it during this visit. Patient doesn't feel like being \"too active\". Reports mild cough 2/2 to a recent bronchitis that is productive of mucus. Pt reports some weakness and not wanting to be active.    Denies HA, dizziness, CP, palpitations,     Review of Systems   Constitutional:  Negative for chills and fever.   HENT:  Positive for hearing loss.    Eyes:  Positive for blurred vision.   Cardiovascular:  Negative for chest pain and palpitations.   Gastrointestinal:  Negative for abdominal pain, constipation, diarrhea, nausea and vomiting.   Genitourinary:  Negative for dysuria.   Musculoskeletal:  Positive for falls and joint pain. Negative for myalgias.   Neurological:  Positive for loss of consciousness and weakness. Negative for dizziness and headaches.   Endo/Heme/Allergies:  Bruises/bleeds easily.   Psychiatric/Behavioral:  Positive for memory loss. Negative for depression. The patient is not nervous/anxious.       Adolfo   has a past medical history of Benign essential HTN, CAD (coronary artery disease) (04/09/1999), Chronic kidney disease, stage III (moderate) ([facility]), CKD (chronic kidney disease) stage 3, GFR 30-59 ml/min ([facility]), Hypercholesterolemia, Presence of bare metal stent in left circumflex coronary artery (04/09/1999), Prostate cancer ([facility]), and PVD (peripheral vascular disease) " "([facility]).    He has no past medical history of Depression.  Immunization History   Administered Date(s) Administered    MODERNA SARS-COV-2 VACCINE (12+) 01/20/2021, 02/17/2021    Zoster Vaccine Recombinant (RZV) ([employer]) 07/05/2021       Allergies  Allergies   Allergen Reactions    Nsaids Unspecified     Unable to take due to kidney       Surgical Hx   has a past surgical history that includes other cardiac surgery.    Medications:  Prior to Admission Medications   Prescriptions Last Dose Informant Patient Reported? Taking?   B Complex Vitamins (VITAMIN B COMPLEX) Tab 4/26/2023 at AM Patient Yes No   Sig: Take 1 Tab by mouth every day.   acetaminophen (TYLENOL) 325 MG Tab PRN at PRN Patient Yes Yes   Sig: Take 650 mg by mouth every four hours as needed for Mild Pain.   ascorbic acid (ASCORBIC ACID) 500 MG Tab 4/26/2023 at AM Patient Yes No   Sig: Take 500 mg by mouth every day.   atorvastatin (LIPITOR) 20 MG Tab 4/26/2023 at AM Patient No No   Sig: Take 1 Tablet by mouth every day.   hydroCHLOROthiazide (HYDRODIURIL) 25 MG Tab FEW DAYS AGO at UNK Patient No No   Sig: TAKE ONE TABLET BY MOUTH ONCE DAILY   Patient taking differently: Take 25 mg by mouth every day. Hold if blood pressure < 120   vitamin D (CHOLECALCIFEROL) 1000 UNIT Tab 4/26/2023 at AM Patient Yes No   Sig: Take 1,000 Units by mouth every day.      Facility-Administered Medications: None       Family Hx:  Family History   Problem Relation Age of Onset    Heart Attack Father 60       Social Hx:  Living Situation: Lives in 2 story home with wife  Work: Retired internist  Tobacco: Denied  Marijuana: Denied  Alcohol: Occasional to help with sleep  Illicit Drugs: Denied    OBJECTIVE   Physical Exam:  BP (!) 143/53   Pulse 86   Temp 36.7 °C (98 °F) (Temporal)   Resp 16   Ht 1.727 m (5' 8\")   Wt 62.7 kg (138 lb 3.7 oz)   SpO2 94%     Intake/Output Summary (Last 24 hours) at 4/28/2023 0614  Last data filed at 4/28/2023 0100  Gross per 24 hour   Intake -- "   Output 450 ml   Net -450 ml       General: Thin male, appears stated age resting comfortable on bed, appears to be in no acute distress.  HEENT: Normocephalic, EOM grossly intact. Hard of hearing without hearing aids.  Cardio: Normal S1 and S2. Regular rate and rhythm. No murmurs, rubs, or gallops.  Pulmonary: Lungs are clear to auscultation bilaterally. No wheezes, rales, or rhonchi.  Abdomen: Normoactive bowel sounds. Abdomen is soft and nondistended. No masses. No tenderness to palpation in all four quadrants.   MSK: Extremities well-perfused. No LE edema. L arm on a sling.  Neuro: A&Ox4. CN II-XII grossly intact. Strength 5/5 and sensation intact throughout.  Skin: Noticeable bruising on L head, L anterior face, L shoulder, and L arm; mild bruising on BL knee R upper and lower extremities have limited to no bruising  Psych: Appropriate mood and affect.    Lab Results:  Recent Labs     04/27/23 1117 04/28/23  0239   WBC 8.3 8.1   RBC 3.30* 3.51*   HEMOGLOBIN 10.9* 11.7*   HEMATOCRIT 32.3* 34.9*   MCV 97.9* 99.4*   MCH 33.0 33.3*   RDW 46.4 47.6   PLATELETCT 78* 82*   MPV 12.5 12.3   NEUTSPOLYS 89.00* 82.90*   LYMPHOCYTES 4.20* 8.70*   MONOCYTES 6.10 7.40   EOSINOPHILS 0.00 0.10   BASOPHILS 0.20 0.40     Recent Labs     04/27/23 1117 04/27/23 2009 04/28/23  0239   SODIUM 138  --  141   POTASSIUM 3.9  --  3.7   CHLORIDE 104  --  109   CO2 18*  --  19*   BUN 57*  --  51*   CREATININE 2.33*  --  2.04*   CALCIUM 8.5  --  8.6   MAGNESIUM  --  2.0  --    ALBUMIN 3.1*  --  3.2     Estimated GFR/CRCL = Estimated Creatinine Clearance: 19.2 mL/min (A) (by C-G formula based on SCr of 2.04 mg/dL (H)).  Recent Labs     04/27/23 1117 04/28/23  0239   GLUCOSE 160* 94     Recent Labs     04/27/23 1117 04/27/23  1139 04/28/23  0239   ASTSGOT 40  --  71*   ALTSGPT 17  --  21   TBILIRUBIN 0.5  --  0.6   ALKPHOSPHAT 58  --  63   GLOBULIN 2.3  --  2.5   INR  --  1.13  --      Recent Labs     04/27/23  1117 04/28/23  0239    CPKTOTAL 1199* 1809*     Recent Labs     04/27/23  1141   EALFH10K 7.39*   UDYWEV860B 29.7   JTXFN618Y 54.2*   CYDP9NAX 86.7*   ARTHCO3 17   ARTBE -6*     Recent Labs     04/27/23  1139   INR 1.13   APTT 25.4       Microbiology Results:  Results       Procedure Component Value Units Date/Time    CoV-2, Flu A/B, And RSV by PCR ([facility]) [[id number]] Collected: 04/27/23 1327    Order Status: Completed Specimen: Respirate from [facility]-Turbinate Updated: 04/27/23 1447     Influenza virus A RNA Negative     Influenza virus B, PCR Negative     RSV, PCR Negative     SARS-CoV-2 by PCR NotDetected     Comment: [facility] providers: PLEASE REFER TO DE-ESCALATION AND RETESTING PROTOCOL  on [email or web address]    **The [employer] GeneXpert Xpress SARS-CoV-2 RT-PCR Test has been made  available for use under the Emergency Use Authorization (EUA) only.          SARS-CoV-2 Source NP Swab    URINALYSIS (UA) [[id number]]  (Abnormal) Collected: 04/27/23 1336    Order Status: Completed Specimen: Urine Updated: 04/27/23 1411     Color Yellow     Character Clear     Specific Gravity 1.033     Ph 5.0     Glucose Negative mg/dL      Ketones Negative mg/dL      Protein 30 mg/dL      Bilirubin Negative     Urobilinogen, Urine 0.2     Nitrite Negative     Leukocyte Esterase Negative     Occult Blood Trace     Micro Urine Req Microscopic            Imaging Results:  DX-FOREARM LEFT   Final Result      No radiographic evidence of acute traumatic injury.      DX-HUMERUS 2+ LEFT   Final Result      1.  Partially visualized comminuted left distal third clavicle fracture.   2.  No acute fracture or malalignment of the humerus.      CT-LSPINE W/O PLUS RECONS   Final Result      Degenerative changes of the lumbar spine without acute fracture or malalignment.      CT-TSPINE W/O PLUS RECONS   Final Result      Degenerative changes of the thoracic spine without acute fracture or malalignment.      CT-CHEST,ABDOMEN,PELVIS WITH   Final Result      1.  Acute,  comminuted fracture of the left clavicle near the acromioclavicular joint. No associated rib fractures. No pneumothorax.   2.  No other traumatic injury in the chest, abdomen or pelvis.   3.  A few areas of linear atelectasis/scarring in the lungs.   4.  Borderline enlarged mediastinal lymph nodes, statistically reactive.   5.  Cholelithiasis.   6.  Severe left hydronephrosis with severe left renal cortical atrophy.   7.  Severe left hydroureter, with a 3 mm distal ureteral stone.   8.  Small colonic diverticula.      CT-MAXILLOFACIAL W/O PLUS RECONS   Final Result      Left periorbital soft tissue swelling without underlying acute fracture or malalignment.      CT-CSPINE WITHOUT PLUS RECONS   Final Result      1.  Degenerative changes of the cervical spine without acute fracture or malalignment.   2.  Extensive stranding in the left inferior neck, supraclavicular fossa and left chest wall consistent with contusion/hematoma.      CT-HEAD W/O   Final Result      1.  No evidence of acute territorial infarct, intracranial hemorrhage or mass lesion.   2.  Left periorbital and left parietal scalp soft tissue swelling and hematoma.   3.  Moderate diffuse cerebral substance loss.   4.  Moderate microangiopathic ischemic change versus demyelination or gliosis.             EKG  Results for orders placed or performed during the hospital encounter of 23   EKG (NOW)   Result Value Ref Range    Report       [facility]    Test Date:  2023  Pt Name:    SAMM ATKINS                   Department: ER  MRN:        [id number]                      Room:       [facility]  Gender:     Male                         Technician: [id number]  :        [id number]                   Requested By:ELVIA SHEARER  Order #:    [id number]                    Reading MD:    Measurements  Intervals                                Axis  Rate:       81                           P:          76  PA:         175                           QRS:        -86  QRSD:       153                          T:          20  QT:         463  QTc:        538    Interpretive Statements  Sinus rhythm  Ventricular premature complex  RBBB and LAFB  Compared to ECG 06/24/2021 11:28:35  Ventricular premature complex(es) now present  Left anterior fascicular block now present  Right bundle-branch block now present         Current Medications    Current Facility-Administered Medications:     NS (BOLUS) infusion 1,000 mL, 1,000 mL, Intravenous, Once, Lina Rankin M.D.    iohexol (OMNIPAQUE) 350 mg/mL (IV), 100 mL, Intravenous, Once, Segundo Ballard M.D.    senna-docusate (PERICOLACE or SENOKOT S) 8.6-50 MG per tablet 2 Tablet, 2 Tablet, Oral, BID, 2 Tablet at 04/27/23 1800 **AND** polyethylene glycol/lytes (MIRALAX) PACKET 1 Packet, 1 Packet, Oral, QDAY PRN **AND** magnesium hydroxide (MILK OF MAGNESIA) suspension 30 mL, 30 mL, Oral, QDAY PRN **AND** bisacodyl (DULCOLAX) suppository 10 mg, 10 mg, Rectal, QDAY PRN, Lina Rnakin M.D.    labetalol (NORMODYNE/TRANDATE) injection 10 mg, 10 mg, Intravenous, Q4HRS PRN, Lina Rankin M.D.    ondansetron (ZOFRAN) syringe/vial injection 4 mg, 4 mg, Intravenous, Q4HRS PRN, Lina Rankin M.D.    ondansetron (ZOFRAN ODT) dispertab 4 mg, 4 mg, Oral, Q4HRS PRN, Lina Rankin M.D.    acetaminophen (Tylenol) tablet 650 mg, 650 mg, Oral, Q6HRS PRN, Lina Rankin M.D.    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    # Fall  # Syncope - mechanical vs seizure vs mechanical  - arrhythmia  cardiogenic --> syncope telemetry monitoring.   - CHT without SDH.  - Pt presented with a fall w/ no recollection of the event or events prior to the fall. He was found by his wife near the bottom of the stairs. As there were no witness. Unsure of if he fell from the base or top of the stairs. He uses his cane and objects around the house to assist with ambulation. At the ED, images were obtained.. CT head negative for bleed. CT C/T/L and CT face negative for fracture. Though L humerus XR shows clavicle fracture.  On physical exam, patient has noticeable bruising on his L anterior head, face shoulder, and arm with some slight bruising on hi BL knee.  - Concerns for syncope. Pt currently on telemetry. Consider getting an echocardiogram. CT head reassuring for lack of brain bleed. Left periorbital and left parietal scalp soft tissue swelling and hematoma.  - An orthopedic surgery consult placed to discuss how to manage his fractures. We will discuss his goals of care in the morning with his family as the patient is 89 and has reported declined cognition. A PT/OT referral will be placed to assess the patient's mobility, help him rehab his arms, and assess risk at home and provide tools to prevent future falls.     # Rhabdomyolysis  - Cr function is improving; 150cc/hr.   - Pt was admitted with a CPK of 1199 during admission. It is 1809 today.  - The management of rhabdomyolysis is continue fluid resuscitation until CPK < 1000 U/L. We will continue to monitor his CPK, urine dipstick, BMP, and kidney function to assess his recovery, possible fluid overload 2/2 administration of fluids, and other complications such as acidemia and uremia, and worsening kidney function. Will replete electrolytes if necessary.    # Chronic kidney disease stage 3  # Acute kidney injury  - Was admitted with a Cr 2.33. 2.04 today. Pt has a h/o of CKD3. Previous Cr was 2.3 during last admission in 12/13/21. BUN/Cr is 51/2.04 = 24 indicative of  a prerenal KRISTY. Likely 2/2 to hemorrhage or possibly dehydration.  - We will manage his KRISTY with fluid resuscitation. Continue to monitor kidney function. Will also hold nephrotoxic agents and medications.     # Closed fracture of clavicle  # L arm bruising  - L humerus XR at the ED shows a clavicle fracture. Patient reports pain on his shoulder that is tolerable with opioids. Pt's L arm is in a sling and intermittent ice pack is provided for comfort.  - An orthopedic surgery consult placed to discuss how to manage his fractures. For his pain, tylenol is available prn and morphine prn for severe. Motrin unavailable 2/2 CKD3. PT/OT is referred to aid in his recovery.    # Coronary artery disease s/p stent  - Pt has a h/o of CAD s/p stent and an MI in the past. Atorvastatin is on his medications list but we are unsure he takes it.  - We will hold this medication for now as there is limited benefit considering his age and there is a risk of worsening rhabdomyolysis/myopathy.    # Hypertension  - Pt has a h/o of HTN on losartan prn when BP measures high at home. Was admitted with 160/71. Today it is 143/53.  - Will hold antihypertensive medications due to KRISTY.  Inpatient labetelol available prn to control BP.    Core Measures:   Fluids: NS @ 150 mL/HR  Lines: PIV  Abx: None  DVT prophylaxis: SCD  Code Status: DNR/DNI

## 2023-04-28 NOTE — PROGRESS NOTES
Bedside report received from ALFONZO Mayorga. Patient is A&Ox3, pleasant, bed in lowest position, bed alarm on, and call light within reach. All questions answered for patient and daughter. IV was accidentally pulled out and a new one inserted. All needs met at this time.

## 2023-04-29 PROBLEM — R05.9 COUGH IN ADULT: Status: ACTIVE | Noted: 2023-01-01

## 2023-04-29 NOTE — CARE PLAN
The patient is Stable - Low risk of patient condition declining or worsening    Shift Goals  Clinical Goals: safetey  Patient Goals: sleep and comfort  Family Goals: ashley    Progress made toward(s) clinical / shift goals:    Problem: Knowledge Deficit - Standard  Goal: Patient and family/care givers will demonstrate understanding of plan of care, disease process/condition, diagnostic tests and medications  Outcome: Progressing  Note: Reorient pt and explained pt plan of care     Problem: Pain - Standard  Goal: Alleviation of pain or a reduction in pain to the patient’s comfort goal  Outcome: Progressing  Note: Promote restful sleep, pt resting, no complaints of sleep       Patient is not progressing towards the following goals:

## 2023-04-29 NOTE — PROGRESS NOTES
Notified on call md Bustillo pt restless and anxious attempting to pull IV peripheral. Pt removed tele monitor and condom catheter and wants to leave out of hospital. Pt vitals stable, A and 0 x 3 pt confused. Received order for 1 time dose of haldol will administer to pt

## 2023-04-29 NOTE — DOCUMENTATION QUERY
"                                                                         [facility]                                                                       Query Response Note      PATIENT:               SAMM ATKINS  ACCT #:                  [id number]  MRN:                     [id number]  :                      1928  ADMIT DATE:       2023 10:43 AM  DISCH DATE:          RESPONDING  PROVIDER #:        [id number]           QUERY TEXT:    Patient was admitted after a fall and found down for at least 8 hours at home. Traumatic rhabdomyolysis is documented in the ED Note however, rhabdomyolysis is not specified as \"traumatic\" in the H&P.    Can this documentation be further clarified?    The patient's Clinical Indicators include:    89 y.o. M s/p fall w/ head strike. Down for about 8 hours.    Hx: CKD 3; HTN; CAD; HLD; PVD, prostate cancer, single kidney  ED: Traumatic rhabdomyolysis; fall; closed head injury; scalp laceration  H&P: Fall, rhabdomyolysis. KRISTY, closed fx L clavicle     -  CPK 1199 -> 1809    Treatment: IV fluids; avoid nephrotoxins; monitor labs; tx underlying conditions  Risk Factors: Fall, down x about 8 hours; fx L clavicle; KRISTY    Thank You,  Irene Mckeon RN  Clinical    Connect via [employer]  Options provided:   -- Traumatic rhabdomyolysis   -- Rhabdomyolysis   -- Other explanation, (please specify other explanation)   -- Unable to determine      Query created by: Irene Mckeon on 2023 6:39 AM    RESPONSE TEXT:    Traumatic rhabdomyolysis       QUERY TEXT:    Per the H&P a diagnosis of rhabdomyolysis and KRISTY is documented.    Can this documentation be further specified?    The patient's Clinical Indicators include:    89 y.o. M s/p fall w/ head strike. Down for about 8 hours.    Hx: CKD 3; HTN; CAD; HLD; PVD, prostate cancer, single kidney  ED: Traumatic Rhabdomyolysis; RBBB; Scalp lac w/ repair  H&P: Fall, rhabdomyolysis. KRISTY, closed fx L " clavicle    4/27 UA: Hyaline Cast: 3 - 5   4/27 - 4/28 CPK: 1199 -> 1809    Treatment: IV fluids; lab testing; avoid nephrotoxic agents  Risk Factors: KRISTY on CKD 3; rhabdomyolysis; age, single kidney    Thank You,  Irene Mckeon RN  Clinical    Connect via [employer]  Options provided:   -- Acute Kidney Injury with Acute Tubular Necrosis   -- Acute Kidney Injury without Acute Tubular Necrosis   -- Other explanation, (please specify other explanation)   -- Unable to determine      Query created by: Irene Mckeon on 4/28/2023 6:39 AM    RESPONSE TEXT:    Acute Kidney Injury without Acute Tubular Necrosis          Electronically signed by:  BETTY WILSON MD 4/28/2023 7:54 PM

## 2023-04-29 NOTE — PROGRESS NOTES
[facility] PROGRESS NOTE     Attending:   Deo Valerio MD    Resident:   Valeria Harkins MD    PATIENT:   Luis Antnoio Turner; [id number]; 1/31/1928    ID:  89 y.o. male with a past medical history significant for HC, HTN, CKD3, PVD, prostate CA, S/p bare metal statin LCA (1999) admitted on 4/27/23 for rhabdomyolysis and KRISTY following an unwitnessed fall with an unknown downtime.    SUBJECTIVE:   No acute events overnight, patient lying in bed naked in no acute distress.  Patient appeared to be agitated and said he wanted needed to get up to sit on the pot.  Patient seemed disoriented with a wet cough. Denies any pain.  Tolerating p.o. without any nausea or vomiting.  Voiding normally no BM since admission.    Per nursing, patient voided 30 mL cola colored colored urine.  PVR showed 685 mL, patient was straight cath which yielded 600 mL.  UA positive for occult blood, protein 100+, WBCs 0-2 hpf and RBCs 5-10 hpf.     Later during rounds, patient was more oriented.  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  Patient reports wet cough has been present for several weeks due to viral bronchitis.     OBJECTIVE:  Vitals:    04/28/23 2000 04/29/23 0000 04/29/23 0400 04/29/23 0500   BP: 130/50 (!) 154/63 (!) 169/75 (!) 183/87   Pulse: 83 90 (!) 105 100   Resp: 16 16 18 17   Temp: 36.8 °C (98.3 °F) 36.8 °C (98.2 °F) 37 °C (98.6 °F)    TempSrc: Temporal Temporal Temporal    SpO2: 92% 96% 95%    Weight:       Height:           Intake/Output Summary (Last 24 hours) at 4/28/2023 0557  Last data filed at 4/28/2023 0100   04/27 0700   04/28 0659 4/28 0700 04/29 0659 4/29 0700 04/30 0659   P.O.  530    I.V.  5685.9    Total Intake  6215.9    Urine 450 200 600   Total Output 450 200 600   Net -450 +6015.9 -600         Number of Times Voided 2 x 1 x    Number of Times Incontinent of Urine  1 x      PHYSICAL EXAM:   General: No acute distress, afebrile, naked, agitated   HEENT: NC/AT. EOMI, wet cough  Cardiovascular: RRR  without murmurs, rubs, heaves. Normal capillary refill   Respiratory: CTAB, no tachypnea or retractions   Abdomen: normal bowel sounds, soft, nontender, nondistended, no masses, no organomegaly   EXT:  RODRIGUEZ, no edema  Skin: No erythema/lesions. Contusions present on left side of body extending from shoulder to hip, contusion on left cheek.  Neuro: Non-focal, alert to verbal stimuli. Not oriented.     LABS:  Recent Labs     04/27/23 1117 04/28/23 0239 04/29/23  0004   WBC 8.3 8.1 7.6   RBC 3.30* 3.51* 3.07*   HEMOGLOBIN 10.9* 11.7* 10.0*   HEMATOCRIT 32.3* 34.9* 31.3*   MCV 97.9* 99.4* 102.0*   MCH 33.0 33.3* 32.6   RDW 46.4 47.6 48.2   PLATELETCT 78* 82* 93*   MPV 12.5 12.3 12.4   NEUTSPOLYS 89.00* 82.90*  --    LYMPHOCYTES 4.20* 8.70*  --    MONOCYTES 6.10 7.40  --    EOSINOPHILS 0.00 0.10  --    BASOPHILS 0.20 0.40  --      Recent Labs     04/27/23 1117 04/27/23 2009 04/28/23 0239 04/29/23  0004 04/29/23  1514   SODIUM 138  --  141 142 141   POTASSIUM 3.9  --  3.7 3.8 3.7   CHLORIDE 104  --  109 114* 112   CO2 18*  --  19* 17* 16*   BUN 57*  --  51* 55* 54*   CREATININE 2.33*  --  2.04* 2.69* 2.78*   CALCIUM 8.5  --  8.6 7.6* 7.6*   MAGNESIUM  --  2.0  --   --   --    ALBUMIN 3.1*  --  3.2  --   --      Estimated GFR/CRCL = Estimated Creatinine Clearance: 14.6 mL/min (A) (by C-G formula based on SCr of 2.69 mg/dL (H)).  Recent Labs     04/27/23 1117 04/28/23 0239 04/29/23  0004   GLUCOSE 160* 94 110*     Recent Labs     04/27/23 1117 04/27/23  1139 04/28/23  0239   ASTSGOT 40  --  71*   ALTSGPT 17  --  21   TBILIRUBIN 0.5  --  0.6   ALKPHOSPHAT 58  --  63   GLOBULIN 2.3  --  2.5   INR  --  1.13  --      Recent Labs     04/28/23  0239 04/28/23  0959 04/29/23  0004   CPKTOTAL 1809* 1532* 859*     Recent Labs     04/27/23  1141   UJEUX35N 7.39*   HEMRYX842B 29.7   IUQLX642V 54.2*   BQRH3AZZ 86.7*   ARTHCO3 17   ARTBE -6*     Recent Labs     04/27/23  1139   INR 1.13   APTT 25.4       MICROBIOLOGY:   No results  found for: BLOODCULTU, BLDCULT, BCHOLD     IMAGING:   DX-FOREARM LEFT   Final Result      No radiographic evidence of acute traumatic injury.      DX-HUMERUS 2+ LEFT   Final Result      1.  Partially visualized comminuted left distal third clavicle fracture.   2.  No acute fracture or malalignment of the humerus.      CT-LSPINE W/O PLUS RECONS   Final Result      Degenerative changes of the lumbar spine without acute fracture or malalignment.      CT-TSPINE W/O PLUS RECONS   Final Result      Degenerative changes of the thoracic spine without acute fracture or malalignment.      CT-CHEST,ABDOMEN,PELVIS WITH   Final Result      1.  Acute, comminuted fracture of the left clavicle near the acromioclavicular joint. No associated rib fractures. No pneumothorax.   2.  No other traumatic injury in the chest, abdomen or pelvis.   3.  A few areas of linear atelectasis/scarring in the lungs.   4.  Borderline enlarged mediastinal lymph nodes, statistically reactive.   5.  Cholelithiasis.   6.  Severe left hydronephrosis with severe left renal cortical atrophy.   7.  Severe left hydroureter, with a 3 mm distal ureteral stone.   8.  Small colonic diverticula.      CT-MAXILLOFACIAL W/O PLUS RECONS   Final Result      Left periorbital soft tissue swelling without underlying acute fracture or malalignment.      CT-CSPINE WITHOUT PLUS RECONS   Final Result      1.  Degenerative changes of the cervical spine without acute fracture or malalignment.   2.  Extensive stranding in the left inferior neck, supraclavicular fossa and left chest wall consistent with contusion/hematoma.      CT-HEAD W/O   Final Result      1.  No evidence of acute territorial infarct, intracranial hemorrhage or mass lesion.   2.  Left periorbital and left parietal scalp soft tissue swelling and hematoma.   3.  Moderate diffuse cerebral substance loss.   4.  Moderate microangiopathic ischemic change versus demyelination or gliosis.             CULTURES:    Results       Procedure Component Value Units Date/Time    CoV-2, Flu A/B, And RSV by PCR ([facility]) [[id number]] Collected: 04/27/23 1327    Order Status: Completed Specimen: Respirate from [facility] Updated: 04/27/23 1447     Influenza virus A RNA Negative     Influenza virus B, PCR Negative     RSV, PCR Negative     SARS-CoV-2 by PCR NotDetected     Comment: [facility] providers: PLEASE REFER TO DE-ESCALATION AND RETESTING PROTOCOL  on [email or web address]    **The [facility] GeneXpert Xpress SARS-CoV-2 RT-PCR Test has been made  available for use under the Emergency Use Authorization (EUA) only.          SARS-CoV-2 Source NP Swab    URINALYSIS (UA) [[id number]]  (Abnormal) Collected: 04/27/23 1336    Order Status: Completed Specimen: Urine Updated: 04/27/23 1411     Color Yellow     Character Clear     Specific Gravity 1.033     Ph 5.0     Glucose Negative mg/dL      Ketones Negative mg/dL      Protein 30 mg/dL      Bilirubin Negative     Urobilinogen, Urine 0.2     Nitrite Negative     Leukocyte Esterase Negative     Occult Blood Trace     Micro Urine Req Microscopic          MEDS:  Current Facility-Administered Medications   Medication Last Admin    senna-docusate (PERICOLACE or SENOKOT S) 8.6-50 MG per tablet 2 Tablet 2 Tablet at 04/27/23 1800    And    polyethylene glycol/lytes (MIRALAX) PACKET 1 Packet      And    magnesium hydroxide (MILK OF MAGNESIA) suspension 30 mL      And    bisacodyl (DULCOLAX) suppository 10 mg      labetalol (NORMODYNE/TRANDATE) injection 10 mg 10 mg at 04/29/23 0536    ondansetron (ZOFRAN) syringe/vial injection 4 mg      ondansetron (ZOFRAN ODT) dispertab 4 mg      acetaminophen (Tylenol) tablet 650 mg 650 mg at 04/28/23 1730    NS infusion New Bag at 04/28/23 1424    morphine 4 MG/ML injection 1 mg 1 mg at 04/28/23 0143       ASSESSMENT/PLAN: 89 y.o. male with a past medical history significant for HC, HTN, CKD3, PVD, prostate CA, S/p bare metal statin LCA (1999) admitted on 4/27/23 for  rhabdomyolysis and KRISTY following an unwitnessed fall with an unknown downtime.    Rhabdomyolysis  Assessment & Plan  CPK elevated in the ED today. Will monitor for fluid overload, electrolyte abnormalities, acidemia, and uremia.  - CPK continues to increase. Anticipates peak serum concentration within 24-72 hours.  Anticipate decline within 3-5 dfays of cessation of muscle injury.  - s/p 1L NS bolus x2  - IVF increased from 150mL/hr to 200mL/hr  - CMP, CPK AM    KRISTY (acute kidney injury) (HCC)  Assessment & Plan  History of CKD 3-4.  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 4/29: BUN 55, Cr 2.04=>2.69=>2.78, baseline 2.3. UA positive for proteinuria. UOP overnight 200mL, PVR 685mL requiring straight cath which yielded 600mL. Repeat PVR 550mL, weinstein placed.  - IVF increased from NS 150mL/hr to 200 mL/hr  - s/p NS 1L bolus  - Hold nephrotoxic agents  - Closely monitor CMP  - If creatinine continues to worsen will consult nephrology    Cough in adult  Assessment & Plan  Patient reports wet cough for over a week due to viral bronchitis  - CXR showed: Bilateral bronchial wall thickening and basilar atelectasis and/or consolidation. Findings could be seen in setting of acute and/or chronic bronchitis with bronchopneumonia.  Left distal third clavicle fracture.  - WBC 7.6  - Supportive care    Elevated troponin  Assessment & Plan  Elevated troponin 134 in the ED. Repeat troponin 200, 195. The patient has not complained of chest pain. Anticipate elevated troponin related to rhabdomyolysis in the setting of CKD3 with one kidney.    - Troponin stable  - If patient complains of chest pain will order stat troponins and repeat EKG    Hypertension  Assessment & Plan  Patient has a known history of essential hypertension for which he self medicates losartan as needed.  -We will consider resuming scheduled losartan prior to discharge.   -We will hold now as patient has KRISTY  -PRN antihypertensives are  avaliable    Fall  Assessment & Plan  The patient is unable to recall events leading up to the fall and the fall itself but reports falling down stairs. The patient's son said the patient has a cane, however, usually uses objects in his house to lean on. CT Head negative for bleed. CT C/T/L negative for fracture. Clavicle fracture present.   - I spoke to the patient's wife today and she was very clear that she does not want surgical interventions as the patient would not want that if given the ability to express that. Her goals include placing the patient somewhere safe upon discharge.   - PT/OT for discharge planning    Closed fracture of clavicle  Assessment & Plan  Present on imaging today.  - Sling and intermittent ice pack for comfort  - Tylenol PRN for pain. Hold motrin for KRISTY.  - PRN Morphine available for severe pain   - Orthopedic surgery consulted from ED. Will appreciate recommendations.       Lactic acidosis  Assessment & Plan  Resolved    Hypercholesterolemia- (present on admission)  Assessment & Plan  - Hold statin for increasing CPK    Core Measures:   Fluids: NS @ 200 mL/hr  Lines: PIV  Abx: None  DVT prophylaxis: SCD  Code Status: DNR/DNI    Disposition: Inpatient for IV fluids due to rhabdomyolysis with KRISTY    Valeria Harkins MD   PGY-1 Family Medicine Resident   [facility]

## 2023-04-30 PROBLEM — I48.91 A-FIB (HCC): Status: ACTIVE | Noted: 2023-01-01

## 2023-04-30 NOTE — PROGRESS NOTES
"  [facility] [place] FAMILY MEDICINE PROGRESS NOTE     Attending:   Deo Valerio MD    Resident:   Valeria Harkins MD    PATIENT:   Luis Antonio Turner; [id number]; 1/31/1928    ID:  89 y.o. male with a past medical history significant for HC, HTN, CKD3, PVD, prostate CA, S/p bare metal statin LCA (1999) admitted on 4/27/23 for rhabdomyolysis and KRISTY following an unwitnessed fall with an unknown downtime.    SUBJECTIVE:   Overnight patient complained of wet cough and shortness of breath and was pulling at lines.  On exam patient had no peripheral edema with diffuse wheezing auscultated.  CXR showed interstitial infiltrates with no evidence of pulmonary edema.  Patient was started on supplemental oxygen and fluids were continued.    Patient sitting up in bed eating breakfast.  When asked patient how he was doing he said, \"I think it is time for me to move on.\"  I asked patient what he meant by that and he said he needed to get to the hospital.  I told patient he was already in the hospital and he seemed confused.  Patient complained that he was having a hard time eating because he could not keep the food balanced on the spoon due to shaking.  Patient also continues to have a wet cough with associated gurgling throat noises.  Tolerating p.o. without any nausea or vomiting.    Notified by nursing staff that patient was in A-fib with RVR with heart rate between 100-150.  EKG showed A-fib with ventricular premature complex, RBBB and LAFB.  Patient started on IV Lopressor 5mg every 5 minutes PRN for HR >110.  Patient continued on telemetry.    OBJECTIVE:  Vitals:    04/29/23 1922 04/29/23 2229 04/30/23 0032 04/30/23 0425   BP: (!) 163/69   (!) 163/73   Pulse: 76 74 72 99   Resp: 17 16 18 17   Temp: 37.2 °C (98.9 °F)   36.9 °C (98.4 °F)   TempSrc: Temporal   Temporal   SpO2: 94% 94% 98% 99%   Weight:       Height:           Intake/Output Summary (Last 24 hours) at 4/28/2023 0557  Last data filed at 4/28/2023 0100   04/28 0700   04/29 0659 " 04/29 0700   04/30 0659   P.O. 530    I.V. 5685.9 3285.9   Total Intake 6215.9 3285.9   Urine 200 2100   Total Output 200 2100   Net +6015.9 +1185.9        Number of Times Voided 1 x    Number of Times Incontinent of Urine 1 x      PHYSICAL EXAM:   General: No acute distress, afebrile, naked, agitated   HEENT: NC/AT. EOMI, wet cough with gurgling sound throat  Cardiovascular: RRR without murmurs, rubs, heaves. Normal capillary refill   Respiratory: CTAB, no tachypnea or retractions   Abdomen: normal bowel sounds, soft, nontender, nondistended, no masses, no organomegaly   EXT:  RODRIGUEZ, no edema  Skin: No erythema/lesions. Contusions present on left side of body extending from shoulder to hip, contusion on left cheek.  Neuro: Non-focal, alert to verbal stimuli. Not oriented.     LABS:  Recent Labs     04/27/23  1117 04/28/23 0239 04/29/23  0004 04/30/23  0036   WBC 8.3 8.1 7.6 7.6   RBC 3.30* 3.51* 3.07* 3.24*   HEMOGLOBIN 10.9* 11.7* 10.0* 10.6*   HEMATOCRIT 32.3* 34.9* 31.3* 32.9*   MCV 97.9* 99.4* 102.0* 101.5*   MCH 33.0 33.3* 32.6 32.7   RDW 46.4 47.6 48.2 48.7   PLATELETCT 78* 82* 93* 123*   MPV 12.5 12.3 12.4 11.6   NEUTSPOLYS 89.00* 82.90*  --  80.60*   LYMPHOCYTES 4.20* 8.70*  --  13.10*   MONOCYTES 6.10 7.40  --  5.30   EOSINOPHILS 0.00 0.10  --  0.10   BASOPHILS 0.20 0.40  --  0.10     Recent Labs     04/27/23  1117 04/27/23 2009 04/28/23 0239 04/29/23  0004 04/29/23  1514 04/30/23  0036   SODIUM 138  --  141 142 141 141   POTASSIUM 3.9  --  3.7 3.8 3.7 3.8   CHLORIDE 104  --  109 114* 112 113*   CO2 18*  --  19* 17* 16* 16*   BUN 57*  --  51* 55* 54* 47*   CREATININE 2.33*  --  2.04* 2.69* 2.78* 2.44*   CALCIUM 8.5  --  8.6 7.6* 7.6* 7.9*   MAGNESIUM  --  2.0  --   --   --  1.7   PHOSPHORUS  --   --   --   --   --  2.8   ALBUMIN 3.1*  --  3.2  --   --  3.0*     Estimated GFR/CRCL = Estimated Creatinine Clearance: 16.1 mL/min (A) (by C-G formula based on SCr of 2.44 mg/dL (H)).  Recent Labs      04/29/23  0004 04/29/23  1514 04/30/23  0036   GLUCOSE 110* 104* 103*     Recent Labs     04/27/23  1117 04/27/23  1139 04/28/23  0239 04/30/23  0036   ASTSGOT 40  --  71* 77*   ALTSGPT 17  --  21 32   TBILIRUBIN 0.5  --  0.6 0.7   ALKPHOSPHAT 58  --  63 62   GLOBULIN 2.3  --  2.5 2.5   INR  --  1.13  --   --      Recent Labs     04/29/23  0608 04/29/23  1514 04/30/23  0036   CPKTOTAL 1077* 1159* 1477*     Recent Labs     04/27/23  1141   KNPDW68V 7.39*   XSSZRP041H 29.7   PLGFU751I 54.2*   QGKM6PFA 86.7*   ARTHCO3 17   ARTBE -6*     Recent Labs     04/27/23  1139   INR 1.13   APTT 25.4       MICROBIOLOGY:   No results found for: BLOODCULTU, BLDCULT, BCHOLD     IMAGING:   DX-CHEST-PORTABLE (1 VIEW)   Final Result      1.  Bilateral lower lung predominant infiltrates, increased.      DX-CHEST-PORTABLE (1 VIEW)   Final Result      1.  Bilateral bronchial wall thickening and basilar atelectasis and/or consolidation. Findings could be seen in setting of acute and/or chronic bronchitis with bronchopneumonia.   2.  Left distal third clavicle fracture.      DX-FOREARM LEFT   Final Result      No radiographic evidence of acute traumatic injury.      DX-HUMERUS 2+ LEFT   Final Result      1.  Partially visualized comminuted left distal third clavicle fracture.   2.  No acute fracture or malalignment of the humerus.      CT-LSPINE W/O PLUS RECONS   Final Result      Degenerative changes of the lumbar spine without acute fracture or malalignment.      CT-TSPINE W/O PLUS RECONS   Final Result      Degenerative changes of the thoracic spine without acute fracture or malalignment.      CT-CHEST,ABDOMEN,PELVIS WITH   Final Result      1.  Acute, comminuted fracture of the left clavicle near the acromioclavicular joint. No associated rib fractures. No pneumothorax.   2.  No other traumatic injury in the chest, abdomen or pelvis.   3.  A few areas of linear atelectasis/scarring in the lungs.   4.  Borderline enlarged mediastinal  lymph nodes, statistically reactive.   5.  Cholelithiasis.   6.  Severe left hydronephrosis with severe left renal cortical atrophy.   7.  Severe left hydroureter, with a 3 mm distal ureteral stone.   8.  Small colonic diverticula.      CT-MAXILLOFACIAL W/O PLUS RECONS   Final Result      Left periorbital soft tissue swelling without underlying acute fracture or malalignment.      CT-CSPINE WITHOUT PLUS RECONS   Final Result      1.  Degenerative changes of the cervical spine without acute fracture or malalignment.   2.  Extensive stranding in the left inferior neck, supraclavicular fossa and left chest wall consistent with contusion/hematoma.      CT-HEAD W/O   Final Result      1.  No evidence of acute territorial infarct, intracranial hemorrhage or mass lesion.   2.  Left periorbital and left parietal scalp soft tissue swelling and hematoma.   3.  Moderate diffuse cerebral substance loss.   4.  Moderate microangiopathic ischemic change versus demyelination or gliosis.             CULTURES:   Results       Procedure Component Value Units Date/Time    URINALYSIS [[id number]]  (Abnormal) Collected: 04/29/23 1040    Order Status: Completed Specimen: Urine, Ruelas Cath Updated: 04/29/23 1120     Color Yellow     Character Cloudy     Specific Gravity 1.019     Ph 5.0     Glucose Negative mg/dL      Ketones Negative mg/dL      Protein 100 mg/dL      Bilirubin Negative     Urobilinogen, Urine 0.2     Nitrite Negative     Leukocyte Esterase Negative     Occult Blood Large     Micro Urine Req Microscopic    Narrative:      Collected By: [id number] MARIA FERNANDA DOUGHERTY    CoV-2, Flu A/B, And RSV by PCR ([employer]) [[id number]] Collected: 04/27/23 1327    Order Status: Completed Specimen: Respirate from [facility] Updated: 04/27/23 1447     Influenza virus A RNA Negative     Influenza virus B, PCR Negative     RSV, PCR Negative     SARS-CoV-2 by PCR NotDetected     Comment: [facility] providers: PLEASE REFER TO DE-ESCALATION AND  RETESTING PROTOCOL  on [email or web address]    **The [employer] GeneXpert Xpress SARS-CoV-2 RT-PCR Test has been made  available for use under the Emergency Use Authorization (EUA) only.          SARS-CoV-2 Source NP Swab    URINALYSIS (UA) [[id number]]  (Abnormal) Collected: 04/27/23 1336    Order Status: Completed Specimen: Urine Updated: 04/27/23 1411     Color Yellow     Character Clear     Specific Gravity 1.033     Ph 5.0     Glucose Negative mg/dL      Ketones Negative mg/dL      Protein 30 mg/dL      Bilirubin Negative     Urobilinogen, Urine 0.2     Nitrite Negative     Leukocyte Esterase Negative     Occult Blood Trace     Micro Urine Req Microscopic          MEDS:  Current Facility-Administered Medications   Medication Last Admin    ipratropium-albuterol (DUONEB) nebulizer solution      HYDROmorphone (Dilaudid) injection 0.5 mg      senna-docusate (PERICOLACE or SENOKOT S) 8.6-50 MG per tablet 2 Tablet 2 Tablet at 04/30/23 0446    And    polyethylene glycol/lytes (MIRALAX) PACKET 1 Packet      And    magnesium hydroxide (MILK OF MAGNESIA) suspension 30 mL      And    bisacodyl (DULCOLAX) suppository 10 mg      labetalol (NORMODYNE/TRANDATE) injection 10 mg 10 mg at 04/29/23 0536    ondansetron (ZOFRAN) syringe/vial injection 4 mg      ondansetron (ZOFRAN ODT) dispertab 4 mg      acetaminophen (Tylenol) tablet 650 mg 650 mg at 04/28/23 1730    [Held by provider] NS infusion Rate Verify at 04/29/23 2000       ASSESSMENT/PLAN: 89 y.o. male with a past medical history significant for HC, HTN, CKD3, PVD, prostate CA, S/p bare metal statin LCA (1999) admitted on 4/27/23 for rhabdomyolysis and KRISTY following an unwitnessed fall with an unknown downtime.    A-fib ([facility])  Assessment & Plan  Notified by nursing staff that patient was in A-fib with RVR with heart rate between 100-150.  EKG showed A-fib with ventricular premature complex, RBBB and LAFB.  Patient has no history of A-fib, likely induced from rhabdomyolysis and  DKA.  Patient started on IV Lopressor 5 mg every 5 minutes PRN HR> 110.   -Continue Lopressor injection 5 mg every 5 minutes PRN for HR>110  -Continued on telemetry  -Continue to monitor.  If patient remains in A-fib with RVR will consider dilt drip for rate control.  -Consider anticoagulation therapy      Rhabdomyolysis  Assessment & Plan  CPK elevated in the ED today. Will monitor for fluid overload, electrolyte abnormalities, acidemia, and uremia. Patient was on home statin which could be contributing to myopathy and elevation of CPK. However, more likely elevated CPK from prolonged immobilization after fall.  - CPK continues to increase. Anticipates peak serum concentration within 24-72 hours.  Anticipate decline within 3-5 dfays of cessation of muscle injury.  - CPK continues to increase, now 1477  - Continue NS 200mL/hr  - CMP, CPK AM    KRISTY (acute kidney injury) (HCC)  Assessment & Plan  History of CKD 3-4.  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 4/29: BUN 55, Cr 2.04=>2.69=>2.78, baseline 2.3. UA positive for proteinuria. UOP overnight 200mL, PVR 685mL requiring straight cath which yielded 600mL. Repeat PVR 550mL, weinstein placed.  - UOP 7.1L in last 24 hours after placement of weinstein  - Continue  mL/hr  - Hold nephrotoxic agents  - Closely monitor CMP  - Cr down to 2.44 from 2.69. If creatinine worsens will consult nephrology    Cough in adult  Assessment & Plan  Patient reports wet cough for over a week due to viral bronchitis  - CXR showed: Bilateral bronchial wall thickening and basilar atelectasis and/or consolidation. Findings could be seen in setting of acute and/or chronic bronchitis with bronchopneumonia.  Left distal third clavicle fracture.  - WBC 7.6  - Supportive care  - Frequent suctioning due to increased mucus  - SLP swallow evaluation due to concern for aspiration    Elevated troponin  Assessment & Plan  Elevated troponin 134 in the ED. Repeat troponin 200, 195. The  patient has not complained of chest pain. Anticipate elevated troponin related to rhabdomyolysis in the setting of CKD3 with one kidney.    - Troponin stable  - If patient complains of chest pain will order stat troponins and repeat EKG    Hypertension  Assessment & Plan  Patient has a known history of essential hypertension for which he self medicates losartan as needed.  -We will consider resuming scheduled losartan prior to discharge.   -We will hold now as patient has KRISTY  -PRN antihypertensives are avaliable    Fall  Assessment & Plan  The patient is unable to recall events leading up to the fall and the fall itself but reports falling down stairs. The patient's son said the patient has a cane, however, usually uses objects in his house to lean on. CT Head negative for bleed. CT C/T/L negative for fracture. Clavicle fracture present.   - I spoke to the patient's wife today and she was very clear that she does not want surgical interventions as the patient would not want that if given the ability to express that. Her goals include placing the patient somewhere safe upon discharge.   - PT/OT for discharge planning    Closed fracture of clavicle  Assessment & Plan  Present on imaging today.  - Sling and intermittent ice pack for comfort  - Tylenol PRN for pain. Hold motrin for KRISTY.  - PRN Morphine available for severe pain   - Orthopedic surgery consulted from ED. Will appreciate recommendations.       Lactic acidosis  Assessment & Plan  Resolved    Hypercholesterolemia- (present on admission)  Assessment & Plan  - Hold statin for increasing CPK    Core Measures:   Fluids: NS @ 200 mL/hr  Lines: PIV  Abx: None  DVT prophylaxis: SCD  Code Status: DNR/DNI    Disposition: Inpatient for IV fluids due to rhabdomyolysis with KRISTY    Valeria Harkins MD   PGY-1 Family Medicine Resident   [facility]

## 2023-04-30 NOTE — RESPIRATORY CARE
1x duoneb give per resident. No change post tx. Bs crackles throughout w/ wet cough. Pt currently on ra w/ sats greater than 92%.

## 2023-04-30 NOTE — CARE PLAN
The patient is Watcher - Medium risk of patient condition declining or worsening    Shift Goals  Clinical Goals: monitor respiratory status, safety  Patient Goals: rest  Family Goals: DEQUAN    Progress made toward(s) clinical / shift goals:      Problem: Knowledge Deficit - Standard  Goal: Patient and family/care givers will demonstrate understanding of plan of care, disease process/condition, diagnostic tests and medications  Description: Target End Date:  1-3 days or as soon as patient condition allows    Document in Patient Education    1.  Patient and family/caregiver oriented to unit, equipment, visitation policy and means for communicating concern  2.  Complete/review Learning Assessment  3.  Assess knowledge level of disease process/condition, treatment plan, diagnostic tests and medications  4.  Explain disease process/condition, treatment plan, diagnostic tests and medications  Outcome: Progressing     Problem: Pain - Standard  Goal: Alleviation of pain or a reduction in pain to the patient’s comfort goal  Description: Target End Date:  Prior to discharge or change in level of care    Document on Vitals flowsheet    1.  Document pain using the appropriate pain scale per order or unit policy  2.  Educate and implement non-pharmacologic comfort measures (i.e. relaxation, distraction, massage, cold/heat therapy, etc.)  3.  Pain management medications as ordered  4.  Reassess pain after pain med administration per policy  5.  If opiods administered assess patient's response to pain medication is appropriate per POSS sedation scale  6.  Follow pain management plan developed in collaboration with patient and interdisciplinary team (including palliative care or pain specialists if applicable)  Outcome: Progressing       Patient is not progressing towards the following goals:

## 2023-04-30 NOTE — PROGRESS NOTES
"Pt with increased wet cough, pt states\" I feel like im working harder to breathe\", 's, SpO2 86-87%. Placed pt on 2L NC, stopped IVF, notified MD Natalya. NT suction per RT, STAT CXR and labs ordered and completed, as well as d/c of IVF. Pt with some improvement on NC, titrated up to 4L for SpO2 <95%. Pt remaining with wet cough, despite encouragement to clear throat. RT to bedside to NT suction pt, moderate amount of thick tan secretions from NT suction with improvement of pt cough and O2 sats (98%). Pt remains on 3L, with intermittant wet cough, resting comfortably, satting 98%, HR 85-96. Care plan continued  "

## 2023-04-30 NOTE — THERAPY
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    PMHx: HC, HTN, CKD3, PVD, prostate CA, S/p bare metal statin LCA (1999)    CXR 4/30/23: Bilateral lower lung predominant infiltrates, increased.       General Information:  O2 (LPM): 2  O2 Delivery Device: Silicone Nasal Cannula  Level of Consciousness: Drowsy  Patient Behaviors: Drowsy  Orientation: Oriented x 4  Follows Directives: Yes      Prior Living Situation & Level of Function:  Prior Services: None  Communication: Some memory impairments at baseline per EMR  Swallowing: WFL       Oral Mechanism Evaluation:  Dentition: Good   Facial Symmetry: Equal  Facial Sensation: Equal     Labial Observations: WFL   Lingual Observations: Midline  Motor Speech: WFL          Laryngeal Function:  Secretion Management:  (audible congestions)  Voice Quality: WFL  Cough: Perceptually weak (congested; nonproductive)       Subjective  Patient received awake with son at bedside. Pt is a retired internal medicine physician. [employer]. Patient denied hx of dysphagia. Pt denied difficulty swallowing during this admission. Pt had congested, unproductive coughing prior to PO intake.       Assessment  Current Method of Nutrition: NPO until cleared by speech pathology  Positioning: Aguilar's (60-90 degrees)  Bolus Administration: SLP, Patient  O2 (LPM): 2 O2 Delivery Device: Silicone Nasal Cannula  Factor(s) Affecting Performance: Impaired endurance       Swallowing Trials:  Thin Liquid (TN0): Impaired    Comments: Adequate bolus acceptance and containment. Onset of swallow trigger was timely. Weak congested cough occurred following trials of thin liquids consistent with cough before session but concerning for aspiration.     Education provided to pt  "and son regarding concerns for aspiration, risk of aspiration, and indication for diagnostic swallow study to rule out aspiration. Pt and son in agreement to proceed with FEES.       Clinical Impressions  Patient is presenting with clinical signs concerning for aspiration: increase in O2 needs requiring NT suctioning, worsening CXR, and weak congested cough following PO. A diagnostic swallow study is indicated prior to further assess oropharyngeal function and rule out aspiration.     Recommendations  Diet Consistency: NPO   -single ice chips OK  Instrumentation: FEES tomorrow AM as appropriate  Medication: Non Oral  Oral Care: Q4h         SLP Treatment Plan  Treatment Plan: Dysphagia Treatment, Patient/Family/Caregiver Training  SLP Frequency: 3x Per Week  Estimated Duration: Until Therapy Goals Met      Anticipated Discharge Needs  Discharge Recommendations: Recommend post-acute placement for additional speech therapy services prior to discharge home   Therapy Recommendations Upon DC: Dysphagia Training, Patient / Family / Caregiver Education        Patient / Family Goals  Patient / Family Goal #1: \"cold water\"  Short Term Goals  Short Term Goal # 1: Pateint will participate in FEES to determine safest means for nutrition.      Anusha Umanzor, SLP   "

## 2023-04-30 NOTE — RESPIRATORY CARE
"Called to bedside due to pt bs sounding \"worse\" along w/ increased hr. Pt on 3L o2 NC prior no o2 on RA. Per RN attempted to sxn pt however, no nt sxn on floor. This RT collected a few nt sxn from a unit. Nt sxn pt with mod secretions white and thick. Per pt he stated he \"felt better.\" Pt remains on o2.   "

## 2023-04-30 NOTE — PROGRESS NOTES
Cross Coverage Note    0030: Nurse notified that patient has increased shortness of breath, increased work of breathing, and sounds worse.  Patient was placed on oxygen at 3 L via nasal cannula.  Stat CXR ordered as well as all morning labs bumped up to now.    0100: CXR reviewed.  Shows increasing bilateral interstitial infiltrates.  Also shows bilateral increasing lower lobe opacities.  Decision made to hold IV fluids.  Patient has been systemically well and labs were reviewed showing normal white cell count.  BMP showed mild improvement of renal function, but CPK is still uptrending.    0115: Checked in on patient.  He was sleeping comfortably in bed so chose not to wake him.  Discussed case with nurse and she states that after NT suction, patient has felt better and oxygen demand has decreased.  Patient also improved after DuoNeb.    A/P  -Hold IVF for now  -Hold antibiotics for now as no clinical or radiological signs of pneumonia at this time  -Continue DuoNebs for shortness of breath/wheezing  -Continue incentive spirometry  -Day team to consider consulting nephrology as worsening rhabdo

## 2023-04-30 NOTE — PROGRESS NOTES
Cross Coverage Note     2000: Notified that patient had a wet cough and was pulling at lines again    2020: Examined patient at bedside. Patient is A&O x 4. He is in some back pain and states that he can't get comfortable. Had a pleasant conversation with patient, he is not altered, responded to commands appropriately. On exam, he had NO peripheral edema. On ascultation of his lungs, diffuse wheezing was auscultated, but NO pulmonary edema. Patient has not history of asthma/COPD. CXR reviewed and does show some more interstitial infiltrates from previous that DO NOT look like pulmonary edema. No obvious consolidation appearing like PNA. No fevers per patient and chart review. Patient is maintaining sats on RA.     A/P:   -Nursing staff concerned about fluid rate. Patient does not appear fluid overloaded and given worsening of renal function and CPK/rhabdomyolysis, continue fluids at same rate.   -Placed a limit to reassess volume status after 2L of fluid given at this rate.   -Patient asked for morphine for pain, contraindicated due to current renal function, gave patient a one time dose of dilaudid and changed PRN order to dilaudid.   -Ordered duonebs for wheezing. Also ordered Incentive Spirometry as recent CXR showed atelectasis. Day team to consider steroids in the setting of bronchitis on CXR.

## 2023-04-30 NOTE — CARE PLAN
The patient is Stable - Low risk of patient condition declining or worsening    Shift Goals  Clinical Goals: safety  Patient Goals: rest  Family Goals: ashley    Progress made toward(s) clinical / shift goals:    Problem: Knowledge Deficit - Standard  Goal: Patient and family/care givers will demonstrate understanding of plan of care, disease process/condition, diagnostic tests and medications  Outcome: Progressing     Problem: Pain - Standard  Goal: Alleviation of pain or a reduction in pain to the patient’s comfort goal  Outcome: Progressing     Problem: Fall Risk  Goal: Patient will remain free from falls  Outcome: Progressing     Problem: Skin Integrity  Goal: Skin integrity is maintained or improved  Outcome: Progressing       Patient is not progressing towards the following goals:

## 2023-04-30 NOTE — PROGRESS NOTES
Pt in afib, notified merle reyes at bs for eval. Afib 90-150s, pt denies hx afib. NT sxn pt, large amt loose secretions back of throat will text RT. Spoke w hospitalist re ivf, stop and reassess ivf at 1400. as

## 2023-04-30 NOTE — PROGRESS NOTES
New orders noted to restart NS infusion for pt. Pt with increasing O2 needs overnight, requiring NT suctioning x3, now on 4 L oxymask. Per MD Arnie NS @ 200 mL/hr reinitiated. No recent echo noted in pt's chart, MD Arnie notified.

## 2023-05-01 NOTE — CARE PLAN
Problem: Bronchoconstriction  Goal: Improve in air movement and diminished wheezing  Description: Target End Date:  2 to 3 days    1.  Implement inhaled treatments  2.  Evaluate and manage medication effects  Outcome: Progressing   DUONEB Q6 hr

## 2023-05-01 NOTE — THERAPY
Physical Therapy   Daily Treatment     Patient Name: Luis Antonio Turner MD  Age:  89 y.o., Sex:  male  Medical Record #: [id number]  Today's Date: 5/1/2023     Precautions  Precautions: (P) Fall Risk;Swallow Precautions;Non Weight Bearing Left Upper Extremity  Comments: (P) Lft clavicle fx, sling for comfort    Assessment    Nrsg cleared pt to participate w/PT, just finished w/his FEES. Pt found resting in bed w/wife and son BS. Per wife, the pt lives alone in [place]. The pt's wife cooks dinners for the pt, the pt manages his breakfast, lunches, and laundry. He is primarily a household ambulator, inconsistent w/using his cane and refused FWW and medical alerts. Per the wife he has fallen outside wo/his phone and crawled back into the house.   Today the pt requires mod assist w/sup->sit transition and max assist w/functional transfers. No ortho consult, formal WB orders would be appreciated. The pt does follow commands throughout his tx session.   PT will cont to follow, progressing pt toward his goals.     Plan    Treatment Plan Status: (P) Continue Current Treatment Plan  Type of Treatment: Bed Mobility, Equipment, Gait Training, Neuro Re-Education / Balance, Self Care / Home Evaluation, Stair Training, Therapeutic Activities, Therapeutic Exercise  Treatment Frequency: 3 Times per Week  Treatment Duration: Until Therapy Goals Met    DC Equipment Recommendations: (P) Unable to determine at this time  Discharge Recommendations: (P) Recommend post-acute placement for additional physical therapy services prior to discharge home     Objective       05/01/23 1201   Charge Group   PT Therapeutic Activities (Units) 3   Total Time Spent   PT Therapeutic Activities Time Spent (Mins) 38   Precautions   Precautions Fall Risk;Swallow Precautions;Non Weight Bearing Left Upper Extremity   Comments Lft clavicle fx, sling for comfort   Pain 0 - 10 Group   Pain Rating Scale (NPRS) 2   Therapist Pain Assessment During Activity   Other  Treatments   Other Treatments Provided Donned Lft arm sling prior to OOB to prevent WB. EOB x 10 mins w/SBA<->CGA. Pt instructed on use of IS w/return demonstration, the family instructed to encourage the pt to use throughout their visit.   Balance   Sitting Balance (Static) Poor +   Sitting Balance (Dynamic) Poor   Standing Balance (Static) Poor   Standing Balance (Dynamic) Poor -   Weight Shift Sitting Poor   Weight Shift Standing Poor   Bed Mobility    Supine to Sit Moderate Assist  (HOB elevated)   Sit to Supine   (pt left seated in the chair)   Gait Analysis   Gait Level Of Assist Unable to Participate   Functional Mobility   Sit to Stand Maximal Assist  (from EOB)   Bed, Chair, Wheelchair Transfer Maximal Assist  (bed->recliner chair)   Comments Pt's transfers today required max assist. Pt w/forward flex'd posture, per family the pt does not stand fully erect.   How much difficulty does the patient currently have...   Turning over in bed (including adjusting bedclothes, sheets and blankets)? 1   Sitting down on and standing up from a chair with arms (e.g., wheelchair, bedside commode, etc.) 1   Moving from lying on back to sitting on the side of the bed? 1   How much help from another person does the patient currently need...   Moving to and from a bed to a chair (including a wheelchair)? 2   Need to walk in a hospital room? 2   Climbing 3-5 steps with a railing? 1   6 clicks Mobility Score 8   Short Term Goals    Short Term Goal # 1 Pt will perform supine <> sit without bed features with SPV in 6 visits to progress bed mobility   Goal Outcome # 1 goal not met   Short Term Goal # 2 Pt will perform STS/functional transfers with LRAD and SPV in 6 visits to progress OOB mobility   Goal Outcome # 2 Goal not met   Short Term Goal # 3 Pt will ambulate 100 ft+ with LRAD and SPV in 6 visits to progress functional ambulation   Goal Outcome # 3 Goal not met   Short Term Goal # 4 Pt will negotiate 3 stairs with min A in  6 visits to initiate stair training   Goal Outcome # 4 Goal not met   Education Group   Role of Physical Therapist Patient Response Patient;Acceptance;Explanation;Reinforcement Needed   Physical Therapy Treatment Plan   Physical Therapy Treatment Plan Continue Current Treatment Plan   Anticipated Discharge Equipment and Recommendations   DC Equipment Recommendations Unable to determine at this time   Discharge Recommendations Recommend post-acute placement for additional physical therapy services prior to discharge home   Interdisciplinary Plan of Care Collaboration   IDT Collaboration with  Nursing;Family / Caregiver   Patient Position at End of Therapy Seated;Call Light within Reach   Collaboration Comments Nrsg notified of pts tx efforts   Session Information   Date / Session Number  5/1--2 (2/3, 5/4) PTA/1   Supervising Physical Therapist (PTA Treatments Only)   Supervising Physical Therapist Lisa Che

## 2023-05-01 NOTE — CARE PLAN
Problem: Bronchoconstriction  Goal: Improve in air movement and diminished wheezing  Description: Target End Date:  2 to 3 days    1.  Implement inhaled treatments  2.  Evaluate and manage medication effects  Outcome: Progressing   Treatments: Duoneb Q6

## 2023-05-01 NOTE — PROGRESS NOTES
[facility] FAMILY MEDICINE PROGRESS NOTE     Attending:   Deo Valerio MD    Resident:   Valeria Harkins MD    PATIENT:   Luis Antonio Turner; [id number]; 1/31/1928    ID:  89 y.o. male with a past medical history significant for HC, HTN, CKD3, PVD, prostate CA, S/p bare metal statin LCA (1999) admitted on 4/27/23 for rhabdomyolysis and KRISTY following an unwitnessed fall with an unknown downtime.    SUBJECTIVE:   Overnight patient patient was disorientated and agitated attempting to pull out lines and was placed in mitts.      OBJECTIVE:  Vitals:    04/30/23 1937 04/30/23 2346 05/01/23 0055 05/01/23 0337   BP: (!) 140/53 138/53  (!) 120/30   Pulse: 92 89 92 94   Resp: 20 18 20 20   Temp: 37 °C (98.6 °F) 36.7 °C (98 °F)  37.3 °C (99.2 °F)   TempSrc: Temporal Temporal  Temporal   SpO2: 94% 95% 92% 92%   Weight:       Height:           Intake/Output Summary (Last 24 hours) at 4/28/2023 0557  Last data filed at 4/28/2023 0100   04/28 0700   04/29 0659 04/29 0700   04/30 0659   P.O. 530    I.V. 5685.9 3285.9   Total Intake 6215.9 3285.9   Urine 200 2100   Total Output 200 2100   Net +6015.9 +1185.9        Number of Times Voided 1 x    Number of Times Incontinent of Urine 1 x      PHYSICAL EXAM:   General: No acute distress, afebrile, naked, agitated   HEENT: NC/AT. EOMI, wet cough with gurgling sound throat  Cardiovascular: RRR without murmurs, rubs, heaves. Normal capillary refill   Respiratory: CTAB, no tachypnea or retractions   Abdomen: normal bowel sounds, soft, nontender, nondistended, no masses, no organomegaly   EXT:  RODRIGUEZ, no edema  Skin: No erythema/lesions. Contusions present on left side of body extending from shoulder to hip, contusion on left cheek.  Neuro: Non-focal, alert to verbal stimuli. Not oriented.     LABS:  Recent Labs     04/29/23  0004 04/30/23  0036   WBC 7.6 7.6   RBC 3.07* 3.24*   HEMOGLOBIN 10.0* 10.6*   HEMATOCRIT 31.3* 32.9*   .0* 101.5*   MCH 32.6 32.7   RDW 48.2 48.7   PLATELETCT 93* 123*    MPV 12.4 11.6   NEUTSPOLYS  --  80.60*   LYMPHOCYTES  --  13.10*   MONOCYTES  --  5.30   EOSINOPHILS  --  0.10   BASOPHILS  --  0.10     Recent Labs     04/30/23 0036 04/30/23 1413 05/01/23  0135   SODIUM 141 141 140   POTASSIUM 3.8 3.6 3.6   CHLORIDE 113* 113* 113*   CO2 16* 17* 13*   BUN 47* 46* 47*   CREATININE 2.44* 2.14* 1.96*   CALCIUM 7.9* 7.6* 7.8*   MAGNESIUM 1.7  --   --    PHOSPHORUS 2.8  --   --    ALBUMIN 3.0* 2.4* 2.4*     Estimated GFR/CRCL = Estimated Creatinine Clearance: 21.8 mL/min (A) (by C-G formula based on SCr of 1.96 mg/dL (H)).  Recent Labs     04/30/23 0036 04/30/23 1413 05/01/23  0135   GLUCOSE 103* 111* 95     Recent Labs     04/30/23 0036 04/30/23 1413 05/01/23  0135   ASTSGOT 77* 65* 73*   ALTSGPT 32 27 31   TBILIRUBIN 0.7 0.6 0.8   ALKPHOSPHAT 62 54 61   GLOBULIN 2.5 2.4 2.7     Recent Labs     04/30/23 0036 04/30/23 1413 05/01/23  0135   CPKTOTAL 1477* 1150* 1271*           No results for input(s): INR, APTT, FIBRINOGEN in the last 72 hours.    Invalid input(s): DIMER      MICROBIOLOGY:   No results found for: BLOODCULTU, BLDCULT, BCHOLD     IMAGING:   DX-CHEST-PORTABLE (1 VIEW)   Final Result      1.  Bilateral lower lung predominant infiltrates, increased.      DX-CHEST-PORTABLE (1 VIEW)   Final Result      1.  Bilateral bronchial wall thickening and basilar atelectasis and/or consolidation. Findings could be seen in setting of acute and/or chronic bronchitis with bronchopneumonia.   2.  Left distal third clavicle fracture.      DX-FOREARM LEFT   Final Result      No radiographic evidence of acute traumatic injury.      DX-HUMERUS 2+ LEFT   Final Result      1.  Partially visualized comminuted left distal third clavicle fracture.   2.  No acute fracture or malalignment of the humerus.      CT-LSPINE W/O PLUS RECONS   Final Result      Degenerative changes of the lumbar spine without acute fracture or malalignment.      CT-TSPINE W/O PLUS RECONS   Final Result       Degenerative changes of the thoracic spine without acute fracture or malalignment.      CT-CHEST,ABDOMEN,PELVIS WITH   Final Result      1.  Acute, comminuted fracture of the left clavicle near the acromioclavicular joint. No associated rib fractures. No pneumothorax.   2.  No other traumatic injury in the chest, abdomen or pelvis.   3.  A few areas of linear atelectasis/scarring in the lungs.   4.  Borderline enlarged mediastinal lymph nodes, statistically reactive.   5.  Cholelithiasis.   6.  Severe left hydronephrosis with severe left renal cortical atrophy.   7.  Severe left hydroureter, with a 3 mm distal ureteral stone.   8.  Small colonic diverticula.      CT-MAXILLOFACIAL W/O PLUS RECONS   Final Result      Left periorbital soft tissue swelling without underlying acute fracture or malalignment.      CT-CSPINE WITHOUT PLUS RECONS   Final Result      1.  Degenerative changes of the cervical spine without acute fracture or malalignment.   2.  Extensive stranding in the left inferior neck, supraclavicular fossa and left chest wall consistent with contusion/hematoma.      CT-HEAD W/O   Final Result      1.  No evidence of acute territorial infarct, intracranial hemorrhage or mass lesion.   2.  Left periorbital and left parietal scalp soft tissue swelling and hematoma.   3.  Moderate diffuse cerebral substance loss.   4.  Moderate microangiopathic ischemic change versus demyelination or gliosis.             CULTURES:   Results       Procedure Component Value Units Date/Time    URINALYSIS [[id number]]  (Abnormal) Collected: 04/29/23 1040    Order Status: Completed Specimen: Urine, Ruelas Cath Updated: 04/29/23 1120     Color Yellow     Character Cloudy     Specific Gravity 1.019     Ph 5.0     Glucose Negative mg/dL      Ketones Negative mg/dL      Protein 100 mg/dL      Bilirubin Negative     Urobilinogen, Urine 0.2     Nitrite Negative     Leukocyte Esterase Negative     Occult Blood Large     Micro Urine Req  Microscopic    Narrative:      Collected By: [id number] MARIA FERNANDA DOUGHERTY    CoV-2, Flu A/B, And RSV by PCR ([employer]) [[id number]] Collected: 04/27/23 1327    Order Status: Completed Specimen: Respirate from Mid-Turbinate Updated: 04/27/23 1447     Influenza virus A RNA Negative     Influenza virus B, PCR Negative     RSV, PCR Negative     SARS-CoV-2 by PCR NotDetected     Comment: [facility] providers: PLEASE REFER TO DE-ESCALATION AND RETESTING PROTOCOL  on inside[email or web address]    **The [employer] GeneXpert Xpress SARS-CoV-2 RT-PCR Test has been made  available for use under the Emergency Use Authorization (EUA) only.          SARS-CoV-2 Source NP Swab    URINALYSIS (UA) [[id number]]  (Abnormal) Collected: 04/27/23 1336    Order Status: Completed Specimen: Urine Updated: 04/27/23 1411     Color Yellow     Character Clear     Specific Gravity 1.033     Ph 5.0     Glucose Negative mg/dL      Ketones Negative mg/dL      Protein 30 mg/dL      Bilirubin Negative     Urobilinogen, Urine 0.2     Nitrite Negative     Leukocyte Esterase Negative     Occult Blood Trace     Micro Urine Req Microscopic          MEDS:  Current Facility-Administered Medications   Medication Last Admin    ipratropium-albuterol (DUONEB) nebulizer solution 3 mL at 05/01/23 0055    Metoprolol Tartrate (LOPRESSOR) injection 5 mg 5 mg at 04/30/23 1722    NS infusion New Bag at 04/30/23 2200    HYDROmorphone (Dilaudid) injection 0.5 mg 0.5 mg at 04/30/23 2315    senna-docusate (PERICOLACE or SENOKOT S) 8.6-50 MG per tablet 2 Tablet 2 Tablet at 04/30/23 0446    And    polyethylene glycol/lytes (MIRALAX) PACKET 1 Packet      And    magnesium hydroxide (MILK OF MAGNESIA) suspension 30 mL      And    bisacodyl (DULCOLAX) suppository 10 mg      ondansetron (ZOFRAN) syringe/vial injection 4 mg      ondansetron (ZOFRAN ODT) dispertab 4 mg      acetaminophen (Tylenol) tablet 650 mg 650 mg at 04/28/23 1730       ASSESSMENT/PLAN: 89 y.o. male with a past medical history  significant for HC, HTN, CKD3, PVD, prostate CA, S/p bare metal statin LCA (1999) admitted on 4/27/23 for rhabdomyolysis and KRISTY following an unwitnessed fall with an unknown downtime.    A-fib ([facility])  Assessment & Plan  Notified by nursing staff that patient was in A-fib with RVR with heart rate between 100-150.  EKG showed A-fib with ventricular premature complex, RBBB and LAFB.  Patient has no history of A-fib, likely induced from rhabdomyolysis and DKA.  Patient started on IV Lopressor 5 mg every 5 minutes PRN HR> 110.   -Continue Lopressor injection 5 mg every 5 minutes PRN for HR>110  -Continued on telemetry  -Continue to monitor.  If patient remains in A-fib with RVR will consider dilt drip for rate control.  -Consider anticoagulation therapy      Rhabdomyolysis  Assessment & Plan  CPK elevated in the ED today. Will monitor for fluid overload, electrolyte abnormalities, acidemia, and uremia. Patient was on home statin which could be contributing to myopathy and elevation of CPK. However, more likely elevated CPK from prolonged immobilization after fall.  - CPK continues to increase. Anticipates peak serum concentration within 24-72 hours.  Anticipate decline within 3-5 dfays of cessation of muscle injury.  - CPK decrease from 1477 to 1271  - Continue NS 200mL/hr  - CMP, CPK AM    KRISTY (acute kidney injury) ([facility])  Assessment & Plan  History of CKD 3-4.  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 4/29: BUN 55, Cr 2.04=>2.69=>2.78, baseline 2.3. UA positive for proteinuria. UOP overnight 200mL, PVR 685mL requiring straight cath which yielded 600mL. Repeat PVR 550mL, weinstein placed.  - Weinstein in place, 1.1L UOP  - Continue  mL/hr  - Hold nephrotoxic agents  - Closely monitor CMP  - Cr down to 1.96 from 2.44   - Bicarb continues to drop, now 13. Will give sodium bicarb and continue to monitor    Cough in adult  Assessment & Plan  Patient reports wet cough for over a week due to viral bronchitis  -  CXR showed: Bilateral bronchial wall thickening and basilar atelectasis and/or consolidation. Findings could be seen in setting of acute and/or chronic bronchitis with bronchopneumonia.  Left distal third clavicle fracture.  - WBC 7.6  - Supportive care  - Frequent suctioning due to increased mucus  - Patient NPO, SLP swallow evaluation today due to concern for aspiration    Elevated troponin  Assessment & Plan  Elevated troponin 134 in the ED. Repeat troponin 200, 195. The patient has not complained of chest pain. Anticipate elevated troponin related to rhabdomyolysis in the setting of CKD3 with one kidney.    - Troponin stable  - If patient complains of chest pain will order stat troponins and repeat EKG    Hypertension  Assessment & Plan  Patient has a known history of essential hypertension for which he self medicates losartan as needed.  -We will consider resuming scheduled losartan prior to discharge.   -We will hold now as patient has KRISTY  -PRN antihypertensives are avaliable    Fall  Assessment & Plan  The patient is unable to recall events leading up to the fall and the fall itself but reports falling down stairs. The patient's son said the patient has a cane, however, usually uses objects in his house to lean on. CT Head negative for bleed. CT C/T/L negative for fracture. Clavicle fracture present.   - I spoke to the patient's wife today and she was very clear that she does not want surgical interventions as the patient would not want that if given the ability to express that. Her goals include placing the patient somewhere safe upon discharge.   - PT/OT for discharge planning  - Will have GOC discussion with family     Closed fracture of clavicle  Assessment & Plan  Present on imaging today.  - Sling and intermittent ice pack for comfort  - Tylenol PRN for pain. Hold motrin for KRISTY.  - PRN Morphine available for severe pain   - Orthopedic surgery consulted from ED. Will appreciate recommendations.        Lactic acidosis  Assessment & Plan  Resolved    Hypercholesterolemia- (present on admission)  Assessment & Plan  - Hold statin for increasing CPK    Core Measures:   Fluids: NS @ 200 mL/hr  Lines: PIV  Abx: None  DVT prophylaxis: SCD  Code Status: DNR/DNI    Disposition: Inpatient for IV fluids due to rhabdomyolysis with KRISTY    Valeria Harkins MD   PGY-1 Family Medicine Resident   [facility]

## 2023-05-01 NOTE — PROGRESS NOTES
Telemetry Shift Summary    Rhythm sr st afib  HR Range 90-110s, up to 130/140  Ectopy pvc, bigem  Measurements ---/.13/---

## 2023-05-01 NOTE — THERAPY
Speech Language Pathology   Flexible Endoscopic Evaluation of Swallowing (FEES)        Patient Name: Luis Antonio Turner MD  AGE:  89 y.o., SEX:  male  Medical Record #: [id number]  Date of Service: 5/1/2023      History of Present Illness  Admitted 4/27/23 after he was found down by his wife at the bottom of the stairs.     PMHx: HC, HTN, CKD3, PVD, prostate CA, S/p bare metal statin LCA (1999)     CXR 4/30/23: Bilateral lower lung predominant infiltrates, increased.       Pertinent Information  Current Method of Nutrition: NPO until cleared by speech pathology      Dentition: Good   Feeding Tube: None   Tracheostomy: No         Factor(s) Affecting Performance: Impaired endurance     Discussed the risks, benefits, and alternatives of the FEES procedure. Patient/family acknowledged and agreed to proceed.      Assessment  Flexible Endoscopic Evaluation of Swallowing (FEES) completed at bedside today. The endoscope was passed transnasally via Left nare to evaluate the anatomy and physiology of swallowing. Pt tolerated the procedure with no apparent distress.       Vocal Fold Motion: Bilateral movement  Secretion Management: Adequate  PO Trials: Ice Chips, Thin Liquid, Mildly Thick Liquid, Liquidised, Pudding, Soft & Bite Sized      Consistency PAS Score Timing Residue Comments   Thin Liquid 7 During swallow, Post swallow Vallecular Residue: Mild (5%-25%)  Pyriform Sinus Residue: Moderate (25%-50%)    Mildly Thick 2 During Swallow Vallecular Residue: Mild (5%-25%)  Pyriform Sinus Residue: Trace (1%-5%)    Liquidised 1 N/A Vallecular Residue: Mild (5%-25%)  Pyriform Sinus Residue: Moderate (25%-50%)    Pudding 1 N/A Vallecular Residue: Mild (5%-25%)  Pyriform Sinus Residue: Moderate (25%-50%)    Soft & Bite Sized 1 N/A Vallecular Residue: Severe (>50%)  Pyriform Sinus Residue: Moderate (25%-50%)      Penetration-Aspiration Scale (PAS)  1     No contrast enters airway  2     Contrast enters the airway, remains above the  vocal folds, and is ejected from the airway (not seen in the airway at the end of the swallow).  3     Contrast enters the airway, remains above the vocal folds, and is not ejected from the airway (is seen in the airway after the swallow).  4     Contrast enters the airway, contacts the vocal folds, and is ejected from the airway.  5     Contrast enters the airway, contacts the vocal folds, and is not ejected from the airway  6     Contrast enters the airway, crosses the plane of the vocal folds, and is ejected from the airway.  7     Contrast enters the airway, crosses the plane of the vocal folds, and is not ejected from the airway despite effort.  8     Contrast enters the airway, crosses the plane of the vocal folds, is not ejected from the airway and there is no response to aspiration.      Oral phase:  Pt presents with mild oral dysphagia, characterized by spillage into the pyriform sinuses prior to triggering swallow, and delayed swallow trigger up to 4 seconds    Pharyngeal phase:  Pt presents with moderate pharyngeal dysphagia, characterized by impaired laryngeal elevation, impaired BOT retraction and impaired pharyngeal constriction.  He had benjy aspiration of thins during the swallow, and after the swallow on residue from retrograde flow seen coming from the esophagus.  Pt responded to aspiration immediately with strong coughing.  He had penetration of mildly thick liquids during the swallow, which was reduced using a breath hold.  Pt had mild to moderate pharyngeal residue, worse with purees and soft solids.  Pt only able to minimally reduce residue using multiple swallows and a liquid wash, placing him at risk for aspiration on residue.      Compensatory Strategies: Breath hold swallow (supraglottic swallow) helped close airway and protect pt from penetration with MTL.  Multiple swallows assisted to reduce pharyngeal residue.      Severity Rating:  Severity Rating: DIGEST Safety Grade  DIGEST Safety  "Grade: Moderate         Clinical Impressions  The pt presents with a moderate oropharyngeal dysphagia.  Ascending and descending aspiration was noted with thin liquids, and penetration was seen with MTL.  Mild to severe pharyngeal residue was also noted.  Pt remains at risk for aspiration secondary to residue, mistiming of swallow, pharyngeal weakness and reduced endurance.  Recommend a modified diet of liquidized solids with mildly thick liquids with 1:1 assistance and use of swallow precautions 100% of the time.  If pt continues to have difficulty with modified diet, please consider MBS to further evaluate swallow function during the swallow.      Recommendations  Diet Consistency: Liquidized solids with Mildly thick liquids  Medication: Crush with applesauce, as appropriate  Supervision: Careful 1:1 hand feeding, Assist with meal tray set up  Positioning: Fully upright and midline during oral intake  Strategies: Small bites/sips  Oral Care: Q4h  Additional Instrumentation: VFSS (MBSS) (if any further difficulties arise)         SLP Treatment Plan  Treatment Plan: Dysphagia Treatment, Patient/Family/Caregiver Training  SLP Frequency: 3x Per Week       Anticipated Discharge Needs  Discharge Recommendations: Recommend post-acute placement for additional speech therapy services prior to discharge home   Therapy Recommendations Upon DC: Dysphagia Training, Patient / Family / Caregiver Education       Patient / Family Goals  Patient / Family Goal #1: \"cold water\"  Short Term Goal # 1: Patient will participate in FEES to determine safest means for nutrition.  Goal Outcome # 1: Goal met, new goal added  Short Term Goal # 1 B : Pt will consume LQ3/MT2 without s/sx of aspiration given direct assistance during meals      Elyssa Muniz, RUDOLPH  "

## 2023-05-01 NOTE — DISCHARGE PLANNING
Received Choice form @: 0516  Agency/Facility Name: [facility]  Referral sent per Choice form @: 0825

## 2023-05-01 NOTE — DISCHARGE PLANNING
Case Management Discharge Planning    Admission Date: 4/27/2023  GMLOS: 4.7  ALOS: 4    6-Clicks ADL Score: 12  6-Clicks Mobility Score: 8  PT and/or OT Eval ordered: Yes  Post-acute Referrals Ordered: Yes  Post-acute Choice Obtained: Yes  Has referral(s) been sent to post-acute provider:  No      Anticipated Discharge Dispo:      DME Needed: No    Action(s) Taken: Choice obtained    LSW made phone call to patient's son, John [phone]. Per John, blanket referrals for SNF okay. John is waiting for his siblings to arrive from out of state to make decisions regarding pt and pt's wife.     LSW and John agreed to send out blanket referral and then decide based on acceptance. Choice faxed to [facility]. Pending SNF referral/orders.    @6816: Patient accepted to [facility], and [facility]. Per MD, patient is not yet medically clear. LSW to speak to John tomorrow 5/2 regarding accepting facilities.     Escalations Completed: None    Medically Clear: No    Next Steps: LSW to follow up with patient and medical team regarding discharge needs and barriers.     Barriers to Discharge: Medical clearance and Pending Placement, Pending Orders

## 2023-05-02 NOTE — HOSPICE
[facility] referral/consult response    Is this patient accepted to [facility]?:Yes  What hospice level of care?:Community  Approved by provider: Alice GOMEZ       Additional Information:     Spoke to wife and family meeting set for Wednesday at 1100 so all family can be present.     Family will have to prepare house for patient to be admitted to community hospice. Per wife Amanda he is a hoarder.     Patient with furrow brow and a lot of secretions.   Primary RN medicating now.

## 2023-05-02 NOTE — CARE PLAN
The patient is Stable - Low risk of patient condition declining or worsening    Shift Goals  Clinical Goals: cpk, secretions  Patient Goals: rest  Family Goals: ashley    Progress made toward(s) clinical / shift goals:  Pt remains confused but able to reorient. Pt family at bedside intermittently. Pt IV and weinstein still intact. Mitts removed early this am. MD aware. Oriented to self, place, and situation but disoriented x3 at times. Pt up to chair with PT this shift. Staff determined it was unsafe to return back to chair due to weakness. Q2 turns maintained. Will continue to monitor.     Patient is not progressing towards the following goals:      Problem: Knowledge Deficit - Standard  Goal: Patient and family/care givers will demonstrate understanding of plan of care, disease process/condition, diagnostic tests and medications  Outcome: Not Met     Problem: Pain - Standard  Goal: Alleviation of pain or a reduction in pain to the patient’s comfort goal  Outcome: Not Met     Problem: Fall Risk  Goal: Patient will remain free from falls  Outcome: Not Met     Problem: Skin Integrity  Goal: Skin integrity is maintained or improved  Outcome: Not Met

## 2023-05-02 NOTE — DISCHARGE PLANNING
Received Choice form @: 6919  Agency/Facility Name: [facility]  Referral sent per Choice form @: Need hospice order

## 2023-05-02 NOTE — THERAPY
Physical Therapy Contact Note    Patient Name: Luis Antonio Turner MD  Age:  89 y.o., Sex:  male  Medical Record #: [id number]  Today's Date: 5/2/2023 05/02/23 1445   Other Treatments   Other Treatments Provided Pt has transitioned to comfort care measures, dc'd from PT services @ this time.   Interdisciplinary Plan of Care Collaboration   IDT Collaboration with  Nursing   Session Information   Date / Session Number  Dc'd 5/2   Supervising Physical Therapist (PTA Treatments Only)   Supervising Physical Therapist Lisa Ceh

## 2023-05-02 NOTE — CARE PLAN
The patient is Unstable - High likelihood or risk of patient condition declining or worsening    Shift Goals  Clinical Goals: Pulm hygiene, Reorientation  Patient Goals: Sleep, No pain  Family Goals: No family present    Progress made toward(s) clinical / shift goals:  Pt Alert and oriented to self and place throughout the night, restless at times, attempting to remove telemetry monitor and IV.  B/L mitts ordered for interference with medical devices.  Ruelas draining CYU. No BM this shift.  Pt suctioned multiple times for excess secretions that pt is unable to clear. 2L NC placed on pt in order to maintain SpO2 above 90%. Will continue with plan of care.    Patient is not progressing towards the following goals:      Problem: Knowledge Deficit - Standard  Goal: Patient and family/care givers will demonstrate understanding of plan of care, disease process/condition, diagnostic tests and medications  Outcome: Not Progressing - Pt not receptive to education     Problem: Safety - Medical Restraint  Goal: Remains free of injury from restraints (Restraint for Interference with Medical Device)  Outcome: Not Progressing  Goal: Free from restraint(s) (Restraint for Interference with Medical Device)  Outcome: Not Progressing - Pt continues to try to remove medical equiptment         Problem: Pain - Standard  Goal: Alleviation of pain or a reduction in pain to the patient’s comfort goal  Outcome: Progressing     Problem: Fall Risk  Goal: Patient will remain free from falls  Outcome: Progressing - No falls this shift     Problem: Skin Integrity  Goal: Skin integrity is maintained or improved  Outcome: Progressing

## 2023-05-02 NOTE — DISCHARGE PLANNING
Patient not medically clear. Patient has various accepting SNFs including [facility], [facility], and [facility]. Per conversations with son, John, he is awaiting his siblings arrival from out of state to make decisions regarding discharge planning.     @1226: Spoke to patient's wife and children at bedside. Per family, they want patient discharged to [address] [place] [address]. They are waiting for [facility] to speak to them and will then provide this LSW with choice for hospice company.     @1529: Per [facility] notes, there will be a family meeting tomorrow at 11am regarding setting patient up with hospice. Family to tidy up home where patient will be discharging to.     Care Transition Team Assessment    Information Source  Orientation Level: Oriented to person, Disoriented to situation, Oriented to place, Disoriented to time  Information Given By: Relative (Son, John)  Informant's Name: John Turner  Who is responsible for making decisions for patient? : Legal next of kin    Readmission Evaluation  Is this a readmission?: No    Elopement Risk  Legal Hold: No  Ambulatory or Self Mobile in Wheelchair: No-Not an Elopement Risk  Disoriented: Situation-At Risk for Elopement, Person-At Risk for Elopement, Place-At Risk for Elopement  Psychiatric Symptoms: None  History of Wandering: No  Elopement this Admit: No  Vocalizing Wanting to Leave: No  Displays Behaviors, Body Language Wanting to Leave: No-Not at Risk for Elopement  Elopement Risk: Not at Risk for Elopement  Wanderguard On: No (See Comments)    Interdisciplinary Discharge Planning  Lives with - Patient's Self Care Capacity: Spouse  Patient or legal guardian wants to designate a caregiver: No  Support Systems: Family Member(s)  Housing / Facility: 2 Story House  Prior Services: None  Durable Medical Equipment: Not Applicable    Discharge Preparedness  What is your plan after discharge?: Skilled nursing facility  What are your discharge supports?:  Spouse, Child  Prior Functional Level: Uses [facility]  Difficulity with ADLs: Walking    Functional Assesment  Prior Functional Level: [facility]    Finances  Financial Barriers to Discharge: No  Prescription Coverage: Yes    Vision / Hearing Impairment  Vision Impairment : Yes  Right Eye Vision: Wears Glasses  Left Eye Vision: Wears Glasses  Hearing Impairment : Yes  Hearing Impairment: Both Ears, Hearing Device(s) Available, Left Ear  Does Pt Need Special Equipment for the Hearing Impaired?: No         Advance Directive  Advance Directive?: POLST    Domestic Abuse  Have you ever been the victim of abuse or violence?: No  Physical Abuse or Sexual Abuse: No  Verbal Abuse or Emotional Abuse: No  Possible Abuse/Neglect Reported to:: Not Applicable         Discharge Risks or Barriers  Discharge risks or barriers?: No    Anticipated Discharge Information  Discharge Disposition: D/T to SNF with Medicare cert in anticipation of skilled care (03)

## 2023-05-02 NOTE — DISCHARGE SUMMARY
"[facility] Medicine Discharge Summary  Date: 5/2/2023 / Time: 1:18 PM     Patient:  Luis Antonio Turner - 89 y.o. male    PMD: Luis Antonio Turner D.O.    CONSULTANTS: None     Hospital Day # Hospital Day: 6    Date of Admit: 4/27/2023    Date of Discharge: 05/02/23      DISCHARGE SUMMARY:   Brief HPI: \"Luis Antonio Turner is a 89 y.o. male who was found down by his wife. The patient is known to be hard of hearing with hearing aids, which he does not have with him today. He uses a cane to walk, however, usually just leans on different objects in his home for balance. He takes losartan PRN for high blood pressure.      The patient's son said the patient was found down by his wife near the staircase. It is unclear whether the patient fell near or on the stair case as the fall was unwitnessed. The patient was unable to recall the events leading up to the fall and unable to recall the fall himself. The patient's family anticipates the patient was down on the floor for approximately an hour. The patient reported having a recent viral bronchitis that he has been treating with vitamin C. 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 Over the last 6 months to 1 year is cognition has declined.\" Per my H&P    Hospital Problem List/Discharge Diagnosis:  Fall  Assessment & Plan  The patient is unable to recall events leading up to the fall and the fall itself but reports falling down stairs. The patient's son said the patient has a cane, however, usually uses objects in his house to lean on. CT Head negative for bleed. CT C/T/L negative for fracture. Clavicle fracture present.   - I spoke to the patient's wife today and she was very clear that she does not want surgical interventions as the patient would not want that if given the ability to express that. Her goals include placing the patient somewhere safe upon discharge.   - PT/OT recommended SNF  - Will have GOC " discussion with family today    Rhabdomyolysis  Assessment & Plan  CPK elevated in the ED today. Will monitor for fluid overload, electrolyte abnormalities, acidemia, and uremia. Patient was on home statin which could be contributing to myopathy and elevation of CPK. However, more likely elevated CPK from prolonged immobilization after fall.  - CPK continues to increase. Anticipates peak serum concentration within 24-72 hours.  Anticipate decline within 3-5 dfays of cessation of muscle injury.  - CPK decrease from 1477=>1271=>834  - Decrease NS 200mL/hr to 150 mL/hr  - CMP, CPK AM    Closed fracture of clavicle  Assessment & Plan  Present on imaging today.  - Sling and intermittent ice pack for comfort  - Tylenol PRN for pain. Hold motrin for KRISTY.  - PRN Morphine available for severe pain   - Orthopedic surgery consulted from ED. Will appreciate recommendations.       Elevated troponin  Assessment & Plan  Elevated troponin 134 in the ED. Repeat troponin 200, 195. The patient has not complained of chest pain. Anticipate elevated troponin related to rhabdomyolysis in the setting of CKD3 with one kidney.    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 4/29: BUN 55, Cr 2.04=>2.69=>2.78, baseline 2.3. UA positive for proteinuria. UOP overnight 200mL, PVR 685mL requiring straight cath which yielded 600mL. Repeat PVR 550mL, weinstein placed.  - Weinstein in place  - Decreased  mL/hr tp 150 ml/hr  - Hold nephrotoxic agents  - Closely monitor CMP  - Cr down to 1.66 from 1.96    - Bicarb normalized, now 20 will discontinue sodium bicarb and will continue to monitor    A-fib (HCC)  Assessment & Plan  Notified by nursing staff that patient was in A-fib with RVR with heart rate between 100-150.  EKG  showed A-fib with ventricular premature complex, RBBB and LAFB.  Patient has no history of A-fib, likely induced from rhabdomyolysis and DKA.  Patient started on IV Lopressor 5 mg every 5 minutes PRN HR> 110.   -Continue Lopressor injection 5 mg every 5 minutes PRN for HR>110  -Continued on telemetry  -Continue to monitor.  If patient remains in A-fib with RVR will consider dilt drip for rate control.  -Started on heparin for anticoagulation therapy      Cough in adult  Assessment & Plan  Patient reports wet cough for over a week due to viral bronchitis  - CXR showed: Bilateral bronchial wall thickening and basilar atelectasis and/or consolidation. Findings could be seen in setting of acute and/or chronic bronchitis with bronchopneumonia.  Left distal third clavicle fracture.  - WBC 7.6  - Supportive care  - Frequent suctioning due to increased mucus  - After SLP swallow evaluation, recommended liquid diet solids with mildly thick liquids to prevent aspiration    Hypertension  Assessment & Plan  Patient has a known history of essential hypertension for which he self medicates losartan as needed.  -We will consider resuming scheduled losartan prior to discharge.   -We will hold now as patient has KRISTY  -PRN antihypertensives are avaliable    Hypercholesterolemia- (present on admission)  Assessment & Plan  - Hold statin for increasing CPK        Hospital Course:   The patient was admitted on 4/27/2023 after being found down for an unknown amount of time.  The patient had significant bruising on his left face and body.  The patient was evaluated by orthopedic surgery as he was found to have a left clavicular fracture in th ED.  It was decided that the patient to receive nonoperative management for the clavicular fracture.  On lab analysis, the patient was found to have an KRISTY and elevated CPK which was concerning for rhabdomyolysis.  The patient was provided IV fluids as result.  Duration of the patient's admission, his  mental status appeared to decline.  He had significant oral secretions that were only minimally relieved by suction.  The patient was evaluated by speech therapy that expressed concerning for aspiration.  The patient's family was requested to meet the primary team at bedside to assess the patient's goals of care and priorities as the patient was unable to express that explicitly himself.  Patient's family had elected to focus on comfort care and to discharge the patient home on hospice as this would align with his goals of care.  The DPOA was present for this conversation and agreed.  The patient will be discharged on 5/2/2023 to home with hospice.    Procedures:  None     Significant Imaging Findings:  DX-CHEST-PORTABLE (1 VIEW)   Final Result      1.  Bilateral lower lung predominant infiltrates, increased.      DX-CHEST-PORTABLE (1 VIEW)   Final Result      1.  Bilateral bronchial wall thickening and basilar atelectasis and/or consolidation. Findings could be seen in setting of acute and/or chronic bronchitis with bronchopneumonia.   2.  Left distal third clavicle fracture.      DX-FOREARM LEFT   Final Result      No radiographic evidence of acute traumatic injury.      DX-HUMERUS 2+ LEFT   Final Result      1.  Partially visualized comminuted left distal third clavicle fracture.   2.  No acute fracture or malalignment of the humerus.      CT-LSPINE W/O PLUS RECONS   Final Result      Degenerative changes of the lumbar spine without acute fracture or malalignment.      CT-TSPINE W/O PLUS RECONS   Final Result      Degenerative changes of the thoracic spine without acute fracture or malalignment.      CT-CHEST,ABDOMEN,PELVIS WITH   Final Result      1.  Acute, comminuted fracture of the left clavicle near the acromioclavicular joint. No associated rib fractures. No pneumothorax.   2.  No other traumatic injury in the chest, abdomen or pelvis.   3.  A few areas of linear atelectasis/scarring in the lungs.   4.   Borderline enlarged mediastinal lymph nodes, statistically reactive.   5.  Cholelithiasis.   6.  Severe left hydronephrosis with severe left renal cortical atrophy.   7.  Severe left hydroureter, with a 3 mm distal ureteral stone.   8.  Small colonic diverticula.      CT-MAXILLOFACIAL W/O PLUS RECONS   Final Result      Left periorbital soft tissue swelling without underlying acute fracture or malalignment.      CT-CSPINE WITHOUT PLUS RECONS   Final Result      1.  Degenerative changes of the cervical spine without acute fracture or malalignment.   2.  Extensive stranding in the left inferior neck, supraclavicular fossa and left chest wall consistent with contusion/hematoma.      CT-HEAD W/O   Final Result      1.  No evidence of acute territorial infarct, intracranial hemorrhage or mass lesion.   2.  Left periorbital and left parietal scalp soft tissue swelling and hematoma.   3.  Moderate diffuse cerebral substance loss.   4.  Moderate microangiopathic ischemic change versus demyelination or gliosis.           Significant Laboratory Findings:  Results for orders placed or performed during the hospital encounter of 04/27/23   CBC WITH DIFFERENTIAL   Result Value Ref Range    WBC 8.3 4.8 - 10.8 K/uL    RBC 3.30 (L) 4.70 - 6.10 M/uL    Hemoglobin 10.9 (L) 14.0 - 18.0 g/dL    Hematocrit 32.3 (L) 42.0 - 52.0 %    MCV 97.9 (H) 81.4 - 97.8 fL    MCH 33.0 27.0 - 33.0 pg    MCHC 33.7 33.7 - 35.3 g/dL    RDW 46.4 35.9 - 50.0 fL    Platelet Count 78 (L) 164 - 446 K/uL    MPV 12.5 9.0 - 12.9 fL    Neutrophils-Polys 89.00 (H) 44.00 - 72.00 %    Lymphocytes 4.20 (L) 22.00 - 41.00 %    Monocytes 6.10 0.00 - 13.40 %    Eosinophils 0.00 0.00 - 6.90 %    Basophils 0.20 0.00 - 1.80 %    Immature Granulocytes 0.50 0.00 - 0.90 %    Nucleated RBC 0.00 /100 WBC    Neutrophils (Absolute) 7.38 1.82 - 7.42 K/uL    Lymphs (Absolute) 0.35 (L) 1.00 - 4.80 K/uL    Monos (Absolute) 0.51 0.00 - 0.85 K/uL    Eos (Absolute) 0.00 0.00 - 0.51 K/uL     Baso (Absolute) 0.02 0.00 - 0.12 K/uL    Immature Granulocytes (abs) 0.04 0.00 - 0.11 K/uL    NRBC (Absolute) 0.00 K/uL   PROTHROMBIN TIME   Result Value Ref Range    PT 14.4 12.0 - 14.6 sec    INR 1.13 0.87 - 1.13   APTT   Result Value Ref Range    APTT 25.4 24.7 - 36.0 sec   ARTERIAL BLOOD GAS   Result Value Ref Range    Ph 7.39 (L) 7.40 - 7.50    Pco2 29.7 26.0 - 37.0 mmHg    Po2 54.2 (L) 64.0 - 87.0 mmHg    O2 Saturation 86.7 (L) 93.0 - 99.0 %    Hco3 17 17 - 25 mmol/L    Base Excess -6 (L) -4 - 3 mmol/L    Body Temp 36.5 Centigrade   LACTIC ACID   Result Value Ref Range    Lactic Acid 2.5 (H) 0.5 - 2.0 mmol/L   COMP METABOLIC PANEL   Result Value Ref Range    Sodium 138 135 - 145 mmol/L    Potassium 3.9 3.6 - 5.5 mmol/L    Chloride 104 96 - 112 mmol/L    Co2 18 (L) 20 - 33 mmol/L    Anion Gap 16.0 7.0 - 16.0    Glucose 160 (H) 65 - 99 mg/dL    Bun 57 (H) 8 - 22 mg/dL    Creatinine 2.33 (H) 0.50 - 1.40 mg/dL    Calcium 8.5 8.5 - 10.5 mg/dL    AST(SGOT) 40 12 - 45 U/L    ALT(SGPT) 17 2 - 50 U/L    Alkaline Phosphatase 58 30 - 99 U/L    Total Bilirubin 0.5 0.1 - 1.5 mg/dL    Albumin 3.1 (L) 3.2 - 4.9 g/dL    Total Protein 5.4 (L) 6.0 - 8.2 g/dL    Globulin 2.3 1.9 - 3.5 g/dL    A-G Ratio 1.3 g/dL   PLATELET MAPPING WITH BASIC TEG   Result Value Ref Range    Reaction Time Initial-R 1.8 (L) 4.6 - 9.1 min    React Time Initial Hep 2.0 (L) 4.3 - 8.3 min    Clot Kinetics-K 1.6 0.8 - 2.1 min    Clot Angle-Angle 71.9 63.0 - 78.0 degrees    Maximum Clot Strength-MA 58.8 52.0 - 69.0 mm    TEG Functional Fibrinogen(MA) 18.9 15.0 - 32.0 mm    % Inhibition ADP 38.2 (H) 0.0 - 17.0 %    % Inhibition AA 81.2 (H) 0.0 - 11.0 %    TEG Algorithm Link Algorithm    URINALYSIS (UA)    Specimen: Urine   Result Value Ref Range    Color Yellow     Character Clear     Specific Gravity 1.033 <1.035    Ph 5.0 5.0 - 8.0    Glucose Negative Negative mg/dL    Ketones Negative Negative mg/dL    Protein 30 (A) Negative mg/dL    Bilirubin  Negative Negative    Urobilinogen, Urine 0.2 Negative    Nitrite Negative Negative    Leukocyte Esterase Negative Negative    Occult Blood Trace (A) Negative    Micro Urine Req Microscopic    CREATINE KINASE   Result Value Ref Range    CPK Total 1199 (H) 0 - 154 U/L   TROPONIN   Result Value Ref Range    Troponin T 134 (H) 6 - 19 ng/L   CORRECTED CALCIUM   Result Value Ref Range    Correct Calcium 9.2 8.5 - 10.5 mg/dL   ESTIMATED GFR   Result Value Ref Range    GFR (CKD-EPI) 25 (A) >60 mL/min/1.73 m 2   CoV-2, Flu A/B, And RSV by PCR ([employer])    Specimen: Mid-Turbinate; Respirate   Result Value Ref Range    Influenza virus A RNA Negative Negative    Influenza virus B, PCR Negative Negative    RSV, PCR Negative Negative    SARS-CoV-2 by PCR NotDetected     SARS-CoV-2 Source NP Swab    URINE MICROSCOPIC (W/UA)   Result Value Ref Range    WBC 0-2 (A) /hpf    RBC 2-5 (A) /hpf    Bacteria Negative None /hpf    Epithelial Cells Negative /hpf    Hyaline Cast 3-5 (A) /lpf   Magnesium   Result Value Ref Range    Magnesium 2.0 1.5 - 2.5 mg/dL   CBC with Differential   Result Value Ref Range    WBC 8.1 4.8 - 10.8 K/uL    RBC 3.51 (L) 4.70 - 6.10 M/uL    Hemoglobin 11.7 (L) 14.0 - 18.0 g/dL    Hematocrit 34.9 (L) 42.0 - 52.0 %    MCV 99.4 (H) 81.4 - 97.8 fL    MCH 33.3 (H) 27.0 - 33.0 pg    MCHC 33.5 (L) 33.7 - 35.3 g/dL    RDW 47.6 35.9 - 50.0 fL    Platelet Count 82 (L) 164 - 446 K/uL    MPV 12.3 9.0 - 12.9 fL    Neutrophils-Polys 82.90 (H) 44.00 - 72.00 %    Lymphocytes 8.70 (L) 22.00 - 41.00 %    Monocytes 7.40 0.00 - 13.40 %    Eosinophils 0.10 0.00 - 6.90 %    Basophils 0.40 0.00 - 1.80 %    Immature Granulocytes 0.50 0.00 - 0.90 %    Nucleated RBC 0.00 /100 WBC    Neutrophils (Absolute) 6.68 1.82 - 7.42 K/uL    Lymphs (Absolute) 0.70 (L) 1.00 - 4.80 K/uL    Monos (Absolute) 0.60 0.00 - 0.85 K/uL    Eos (Absolute) 0.01 0.00 - 0.51 K/uL    Baso (Absolute) 0.03 0.00 - 0.12 K/uL    Immature Granulocytes (abs) 0.04 0.00 -  0.11 K/uL    NRBC (Absolute) 0.00 K/uL   Comp Metabolic Panel (CMP)   Result Value Ref Range    Sodium 141 135 - 145 mmol/L    Potassium 3.7 3.6 - 5.5 mmol/L    Chloride 109 96 - 112 mmol/L    Co2 19 (L) 20 - 33 mmol/L    Anion Gap 13.0 7.0 - 16.0    Glucose 94 65 - 99 mg/dL    Bun 51 (H) 8 - 22 mg/dL    Creatinine 2.04 (H) 0.50 - 1.40 mg/dL    Calcium 8.6 8.5 - 10.5 mg/dL    AST(SGOT) 71 (H) 12 - 45 U/L    ALT(SGPT) 21 2 - 50 U/L    Alkaline Phosphatase 63 30 - 99 U/L    Total Bilirubin 0.6 0.1 - 1.5 mg/dL    Albumin 3.2 3.2 - 4.9 g/dL    Total Protein 5.7 (L) 6.0 - 8.2 g/dL    Globulin 2.5 1.9 - 3.5 g/dL    A-G Ratio 1.3 g/dL   CREATINE KINASE   Result Value Ref Range    CPK Total 1809 (HH) 0 - 154 U/L   CORRECTED CALCIUM   Result Value Ref Range    Correct Calcium 9.2 8.5 - 10.5 mg/dL   ESTIMATED GFR   Result Value Ref Range    GFR (CKD-EPI) 29 (A) >60 mL/min/1.73 m 2   LACTIC ACID   Result Value Ref Range    Lactic Acid 1.9 0.5 - 2.0 mmol/L   TROPONIN   Result Value Ref Range    Troponin T 200 (H) 6 - 19 ng/L   CREATINE KINASE   Result Value Ref Range    CPK Total 1532 (HH) 0 - 154 U/L   LACTIC ACID   Result Value Ref Range    Lactic Acid 1.4 0.5 - 2.0 mmol/L   LACTIC ACID   Result Value Ref Range    Lactic Acid 1.4 0.5 - 2.0 mmol/L   CBC WITHOUT DIFFERENTIAL   Result Value Ref Range    WBC 7.6 4.8 - 10.8 K/uL    RBC 3.07 (L) 4.70 - 6.10 M/uL    Hemoglobin 10.0 (L) 14.0 - 18.0 g/dL    Hematocrit 31.3 (L) 42.0 - 52.0 %    .0 (H) 81.4 - 97.8 fL    MCH 32.6 27.0 - 33.0 pg    MCHC 31.9 (L) 33.7 - 35.3 g/dL    RDW 48.2 35.9 - 50.0 fL    Platelet Count 93 (L) 164 - 446 K/uL    MPV 12.4 9.0 - 12.9 fL   Basic Metabolic Panel   Result Value Ref Range    Sodium 142 135 - 145 mmol/L    Potassium 3.8 3.6 - 5.5 mmol/L    Chloride 114 (H) 96 - 112 mmol/L    Co2 17 (L) 20 - 33 mmol/L    Glucose 110 (H) 65 - 99 mg/dL    Bun 55 (H) 8 - 22 mg/dL    Creatinine 2.69 (H) 0.50 - 1.40 mg/dL    Calcium 7.6 (L) 8.5 - 10.5 mg/dL     Anion Gap 11.0 7.0 - 16.0   CREATINE KINASE   Result Value Ref Range    CPK Total 859 (H) 0 - 154 U/L   ESTIMATED GFR   Result Value Ref Range    GFR (CKD-EPI) 21 (A) >60 mL/min/1.73 m 2   URIC ACID   Result Value Ref Range    Uric Acid 7.6 2.5 - 8.3 mg/dL   CREATINE KINASE   Result Value Ref Range    CPK Total 1077 (H) 0 - 154 U/L   TROPONIN   Result Value Ref Range    Troponin T 195 (H) 6 - 19 ng/L   URINALYSIS    Specimen: Urine, Ruelas Cath   Result Value Ref Range    Color Yellow     Character Cloudy (A)     Specific Gravity 1.019 <1.035    Ph 5.0 5.0 - 8.0    Glucose Negative Negative mg/dL    Ketones Negative Negative mg/dL    Protein 100 (A) Negative mg/dL    Bilirubin Negative Negative    Urobilinogen, Urine 0.2 Negative    Nitrite Negative Negative    Leukocyte Esterase Negative Negative    Occult Blood Large (A) Negative    Micro Urine Req Microscopic    URINE MICROSCOPIC (W/UA)   Result Value Ref Range    WBC 0-2 (A) /hpf    RBC 5-10 (A) /hpf    Bacteria Negative None /hpf    Epithelial Cells Negative /hpf    Hyaline Cast 0-2 /lpf   Basic Metabolic Panel   Result Value Ref Range    Sodium 141 135 - 145 mmol/L    Potassium 3.7 3.6 - 5.5 mmol/L    Chloride 112 96 - 112 mmol/L    Co2 16 (L) 20 - 33 mmol/L    Glucose 104 (H) 65 - 99 mg/dL    Bun 54 (H) 8 - 22 mg/dL    Creatinine 2.78 (H) 0.50 - 1.40 mg/dL    Calcium 7.6 (L) 8.5 - 10.5 mg/dL    Anion Gap 13.0 7.0 - 16.0   CREATINE KINASE   Result Value Ref Range    CPK Total 1159 (H) 0 - 154 U/L   ESTIMATED GFR   Result Value Ref Range    GFR (CKD-EPI) 20 (A) >60 mL/min/1.73 m 2   CBC WITH DIFFERENTIAL   Result Value Ref Range    WBC 7.6 4.8 - 10.8 K/uL    RBC 3.24 (L) 4.70 - 6.10 M/uL    Hemoglobin 10.6 (L) 14.0 - 18.0 g/dL    Hematocrit 32.9 (L) 42.0 - 52.0 %    .5 (H) 81.4 - 97.8 fL    MCH 32.7 27.0 - 33.0 pg    MCHC 32.2 (L) 33.7 - 35.3 g/dL    RDW 48.7 35.9 - 50.0 fL    Platelet Count 123 (L) 164 - 446 K/uL    MPV 11.6 9.0 - 12.9 fL     Neutrophils-Polys 80.60 (H) 44.00 - 72.00 %    Lymphocytes 13.10 (L) 22.00 - 41.00 %    Monocytes 5.30 0.00 - 13.40 %    Eosinophils 0.10 0.00 - 6.90 %    Basophils 0.10 0.00 - 1.80 %    Immature Granulocytes 0.80 0.00 - 0.90 %    Nucleated RBC 0.00 /100 WBC    Neutrophils (Absolute) 6.13 1.82 - 7.42 K/uL    Lymphs (Absolute) 1.00 1.00 - 4.80 K/uL    Monos (Absolute) 0.40 0.00 - 0.85 K/uL    Eos (Absolute) 0.01 0.00 - 0.51 K/uL    Baso (Absolute) 0.01 0.00 - 0.12 K/uL    Immature Granulocytes (abs) 0.06 0.00 - 0.11 K/uL    NRBC (Absolute) 0.00 K/uL   Comp Metabolic Panel   Result Value Ref Range    Sodium 141 135 - 145 mmol/L    Potassium 3.8 3.6 - 5.5 mmol/L    Chloride 113 (H) 96 - 112 mmol/L    Co2 16 (L) 20 - 33 mmol/L    Anion Gap 12.0 7.0 - 16.0    Glucose 103 (H) 65 - 99 mg/dL    Bun 47 (H) 8 - 22 mg/dL    Creatinine 2.44 (H) 0.50 - 1.40 mg/dL    Calcium 7.9 (L) 8.5 - 10.5 mg/dL    AST(SGOT) 77 (H) 12 - 45 U/L    ALT(SGPT) 32 2 - 50 U/L    Alkaline Phosphatase 62 30 - 99 U/L    Total Bilirubin 0.7 0.1 - 1.5 mg/dL    Albumin 3.0 (L) 3.2 - 4.9 g/dL    Total Protein 5.5 (L) 6.0 - 8.2 g/dL    Globulin 2.5 1.9 - 3.5 g/dL    A-G Ratio 1.2 g/dL   MAGNESIUM   Result Value Ref Range    Magnesium 1.7 1.5 - 2.5 mg/dL   PHOSPHORUS   Result Value Ref Range    Phosphorus 2.8 2.5 - 4.5 mg/dL   CREATINE KINASE   Result Value Ref Range    CPK Total 1477 (H) 0 - 154 U/L   ESTIMATED GFR   Result Value Ref Range    GFR (CKD-EPI) 24 (A) >60 mL/min/1.73 m 2   CORRECTED CALCIUM   Result Value Ref Range    Correct Calcium 8.7 8.5 - 10.5 mg/dL   Comp Metabolic Panel   Result Value Ref Range    Sodium 141 135 - 145 mmol/L    Potassium 3.6 3.6 - 5.5 mmol/L    Chloride 113 (H) 96 - 112 mmol/L    Co2 17 (L) 20 - 33 mmol/L    Anion Gap 11.0 7.0 - 16.0    Glucose 111 (H) 65 - 99 mg/dL    Bun 46 (H) 8 - 22 mg/dL    Creatinine 2.14 (H) 0.50 - 1.40 mg/dL    Calcium 7.6 (L) 8.5 - 10.5 mg/dL    AST(SGOT) 65 (H) 12 - 45 U/L    ALT(SGPT) 27 2 -  50 U/L    Alkaline Phosphatase 54 30 - 99 U/L    Total Bilirubin 0.6 0.1 - 1.5 mg/dL    Albumin 2.4 (L) 3.2 - 4.9 g/dL    Total Protein 4.8 (L) 6.0 - 8.2 g/dL    Globulin 2.4 1.9 - 3.5 g/dL    A-G Ratio 1.0 g/dL   CREATINE KINASE   Result Value Ref Range    CPK Total 1150 (H) 0 - 154 U/L   ESTIMATED GFR   Result Value Ref Range    GFR (CKD-EPI) 28 (A) >60 mL/min/1.73 m 2   CORRECTED CALCIUM   Result Value Ref Range    Correct Calcium 8.9 8.5 - 10.5 mg/dL   Comp Metabolic Panel   Result Value Ref Range    Sodium 140 135 - 145 mmol/L    Potassium 3.6 3.6 - 5.5 mmol/L    Chloride 113 (H) 96 - 112 mmol/L    Co2 13 (L) 20 - 33 mmol/L    Anion Gap 14.0 7.0 - 16.0    Glucose 95 65 - 99 mg/dL    Bun 47 (H) 8 - 22 mg/dL    Creatinine 1.96 (H) 0.50 - 1.40 mg/dL    Calcium 7.8 (L) 8.5 - 10.5 mg/dL    AST(SGOT) 73 (H) 12 - 45 U/L    ALT(SGPT) 31 2 - 50 U/L    Alkaline Phosphatase 61 30 - 99 U/L    Total Bilirubin 0.8 0.1 - 1.5 mg/dL    Albumin 2.4 (L) 3.2 - 4.9 g/dL    Total Protein 5.1 (L) 6.0 - 8.2 g/dL    Globulin 2.7 1.9 - 3.5 g/dL    A-G Ratio 0.9 g/dL   CREATINE KINASE   Result Value Ref Range    CPK Total 1271 (H) 0 - 154 U/L   ESTIMATED GFR   Result Value Ref Range    GFR (CKD-EPI) 31 (A) >60 mL/min/1.73 m 2   CORRECTED CALCIUM   Result Value Ref Range    Correct Calcium 9.1 8.5 - 10.5 mg/dL   Comp Metabolic Panel   Result Value Ref Range    Sodium 145 135 - 145 mmol/L    Potassium 3.6 3.6 - 5.5 mmol/L    Chloride 112 96 - 112 mmol/L    Co2 20 20 - 33 mmol/L    Anion Gap 13.0 7.0 - 16.0    Glucose 140 (H) 65 - 99 mg/dL    Bun 42 (H) 8 - 22 mg/dL    Creatinine 1.66 (H) 0.50 - 1.40 mg/dL    Calcium 7.9 (L) 8.5 - 10.5 mg/dL    AST(SGOT) 63 (H) 12 - 45 U/L    ALT(SGPT) 34 2 - 50 U/L    Alkaline Phosphatase 60 30 - 99 U/L    Total Bilirubin 1.2 0.1 - 1.5 mg/dL    Albumin 2.6 (L) 3.2 - 4.9 g/dL    Total Protein 5.1 (L) 6.0 - 8.2 g/dL    Globulin 2.5 1.9 - 3.5 g/dL    A-G Ratio 1.0 g/dL   CREATINE KINASE   Result Value Ref  Range    CPK Total 834 (H) 0 - 154 U/L   CORRECTED CALCIUM   Result Value Ref Range    Correct Calcium 9.0 8.5 - 10.5 mg/dL   ESTIMATED GFR   Result Value Ref Range    GFR (CKD-EPI) 38 (A) >60 mL/min/1.73 m 2   EKG (NOW)   Result Value Ref Range    Report       [facility] Emergency Dept.    Test Date:  2023  Pt Name:    SAMM ATKINS                   Department: ER  MRN:        [id number]                      Room:        13  Gender:     Male                         Technician: [id number]  :        [id number]                   Requested By:ELVIA SHEARER  Order #:    [id number]                    Reading MD:    Measurements  Intervals                                Axis  Rate:       81                           P:          76  TN:         175                          QRS:        -86  QRSD:       153                          T:          20  QT:         463  QTc:        538    Interpretive Statements  Sinus rhythm  Ventricular premature complex  RBBB and LAFB  Compared to ECG 2021 11:28:35  Ventricular premature complex(es) now present  Left anterior fascicular block now present  Right bundle-branch block now present     EKG   Result Value Ref Range    Report       [facility]    Test Date:  2023  Pt Name:    SAMM ATKINS                   Department: [facility]  MRN:        [id number]                      Room:       52  Gender:     Male                         Technician: [id number]  :        [id number]                   Requested By:CECILIA MONTES DE OCA  Order #:    [id number]                    Reading MD: Kenyon Saunders MD    Measurements  Intervals                                Axis  Rate:       91                           P:          60  TN:         167                          QRS:        -71  QRSD:       149                          T:          29  QT:         410  QTc:        505    Interpretive Statements  Sinus rhythm  Probable left atrial enlargement  RBBB and  LAFB  Electronically Signed On 2023 14:04:43 PDT by Kenyon Saunders MD     EKG   Result Value Ref Range    Report       [facility]    Test Date:  2023  Pt Name:    SAMM ATKINS                   Department: [facility]  MRN:        [id number]                      Room:       [facility]  Gender:     Male                         Technician: MELVIN  :        [id number]                   Requested By:CECILIA MONTES DE OCA  Order #:    [id number]                    Reading MD: Gabriel Adhikari MD    Measurements  Intervals                                Axis  Rate:       97                           P:  MN:                                      QRS:        -101  QRSD:       149                          T:          22  QT:         400  QTc:        508    Interpretive Statements  Atrial fibrillation  Ventricular premature complex  RBBB and LAFB  Electronically Signed On 2023 14:51:27 PDT by Gabriel Adhikari MD           Disposition:  Discharge to: home with hospice    Follow Up:  None    Discharge  Medications:      Medication List        START taking these medications        Instructions   carboxymethylcellulose 0.5 % Soln  Commonly known as: REFRESH TEARS   Administer 1 Drop into both eyes as needed (dry eyes) for up to 30 days.  Dose: 1 Drop     docusate sodium 100 MG Caps   Take 100 mg by mouth 2 times a day as needed for Constipation for up to 30 days.  Dose: 100 mg     glycopyrrolate 1 MG Tabs  Commonly known as: ROBINUL   Take 1 Tablet by mouth 3 times a day as needed (oral secretions) for up to 30 days.  Dose: 1 mg     LORazepam 2 MG/ML Conc  Commonly known as: ATIVAN   Place 0.5 mL under the tongue every 1 hour as needed (Anxiety/Restlessness) for up to 7 days.  Dose: 1 mg     morphine 20 MG/ML Soln  Commonly known as: ROXANOL   Take 0.25 mL by mouth every 1 hour as needed (pain, air hunger) for up to 7 days.  Dose: 5 mg     ondansetron 8 MG Tbdp  Commonly known as: ZOFRAN ODT   Take 1 Tablet by mouth every 8 hours as  needed for Nausea/Vomiting.  Dose: 8 mg     scopolamine 1 mg/72hr Pt72  Commonly known as: TRANSDERM-SCOP   Place 1 Patch on the skin every 72 hours.  Dose: 1 Patch            CONTINUE taking these medications        Instructions   acetaminophen 325 MG Tabs  Commonly known as: Tylenol   Take 650 mg by mouth every four hours as needed for Mild Pain.  Dose: 650 mg            STOP taking these medications      ascorbic acid 500 MG Tabs  Commonly known as: ascorbic acid     atorvastatin 20 MG Tabs  Commonly known as: LIPITOR     hydroCHLOROthiazide 25 MG Tabs  Commonly known as: HYDRODIURIL     Vitamin B Complex Tabs     vitamin D 1000 Unit (25 mcg) Tabs  Commonly known as: cholecalciferol                CC: Lui sAntonio Turner D.O.

## 2023-05-02 NOTE — PROGRESS NOTES
[facility] [place] FAMILY MEDICINE PROGRESS NOTE     Attending:   Deo Valerio MD    Resident:   Valeria Harkins MD    PATIENT:   Luis Antonio Turner; [id number]; 1/31/1928    ID:  89 y.o. male with a past medical history significant for HC, HTN, CKD3, PVD, prostate CA, S/p bare metal statin LCA (1999) admitted on 4/27/23 for rhabdomyolysis and KRISTY following an unwitnessed fall with an unknown downtime.    SUBJECTIVE:   -SLP evaluation recommended liquid diet solids with mildly thick liquids to prevent aspiration    Overnight patient became agitated and started pulling out lines mittens were applied.  Afib w/ RVR at midnight, currently in SR/ST w/ PVC with HR     Patient resting in bed comfortably in no acute distress.     OBJECTIVE:  Vitals:    05/01/23 1919 05/01/23 2351 05/02/23 0157 05/02/23 0336   BP: (!) 170/69 (!) 159/78  (!) 169/69   Pulse: 100 (!) 115 99 (!) 113   Resp: 20 20 20 20   Temp: 37.1 °C (98.8 °F) 37.4 °C (99.3 °F)  37.8 °C (100.1 °F)   TempSrc: Temporal Temporal  Temporal   SpO2: 90% 94% 93% 91%   Weight:       Height:           Intake/Output Summary (Last 24 hours) at 4/28/2023 0557  Last data filed at 4/28/2023 0100   04/28 0700   04/29 0659 04/29 0700   04/30 0659   P.O. 530    I.V. 5685.9 3285.9   Total Intake 6215.9 3285.9   Urine 200 2100   Total Output 200 2100   Net +6015.9 +1185.9        Number of Times Voided 1 x    Number of Times Incontinent of Urine 1 x      PHYSICAL EXAM:   General: No acute distress, afebrile, agitated   HEENT: NC/AT. EOMI, wet cough with gurgling sound throat  Cardiovascular: RRR without murmurs, rubs, heaves. Normal capillary refill   Respiratory: CTAB, no tachypnea or retractions   Abdomen: normal bowel sounds, soft, nontender, nondistended, no masses, no organomegaly   EXT:  RODRIGUEZ, no edema  Skin: No erythema/lesions. Contusions present on left side of body extending from shoulder to hip, contusion on left cheek.  Neuro: Non-focal, alert to verbal stimuli. Not oriented.      LABS:  Recent Labs     04/30/23 0036   WBC 7.6   RBC 3.24*   HEMOGLOBIN 10.6*   HEMATOCRIT 32.9*   .5*   MCH 32.7   RDW 48.7   PLATELETCT 123*   MPV 11.6   NEUTSPOLYS 80.60*   LYMPHOCYTES 13.10*   MONOCYTES 5.30   EOSINOPHILS 0.10   BASOPHILS 0.10     Recent Labs     04/30/23  0036 04/30/23  1413 05/01/23  0135   SODIUM 141 141 140   POTASSIUM 3.8 3.6 3.6   CHLORIDE 113* 113* 113*   CO2 16* 17* 13*   BUN 47* 46* 47*   CREATININE 2.44* 2.14* 1.96*   CALCIUM 7.9* 7.6* 7.8*   MAGNESIUM 1.7  --   --    PHOSPHORUS 2.8  --   --    ALBUMIN 3.0* 2.4* 2.4*     Estimated GFR/CRCL = Estimated Creatinine Clearance: 21.8 mL/min (A) (by C-G formula based on SCr of 1.96 mg/dL (H)).  Recent Labs     04/30/23  0036 04/30/23 1413 05/01/23  0135   GLUCOSE 103* 111* 95     Recent Labs     04/30/23  0036 04/30/23  1413 05/01/23  0135   ASTSGOT 77* 65* 73*   ALTSGPT 32 27 31   TBILIRUBIN 0.7 0.6 0.8   ALKPHOSPHAT 62 54 61   GLOBULIN 2.5 2.4 2.7     Recent Labs     04/30/23  0036 04/30/23 1413 05/01/23  0135   CPKTOTAL 1477* 1150* 1271*           No results for input(s): INR, APTT, FIBRINOGEN in the last 72 hours.    Invalid input(s): DIMER      MICROBIOLOGY:   No results found for: BLOODCULTU, BLDCULT, BCHOLD     IMAGING:   DX-CHEST-PORTABLE (1 VIEW)   Final Result      1.  Bilateral lower lung predominant infiltrates, increased.      DX-CHEST-PORTABLE (1 VIEW)   Final Result      1.  Bilateral bronchial wall thickening and basilar atelectasis and/or consolidation. Findings could be seen in setting of acute and/or chronic bronchitis with bronchopneumonia.   2.  Left distal third clavicle fracture.      DX-FOREARM LEFT   Final Result      No radiographic evidence of acute traumatic injury.      DX-HUMERUS 2+ LEFT   Final Result      1.  Partially visualized comminuted left distal third clavicle fracture.   2.  No acute fracture or malalignment of the humerus.      CT-LSPINE W/O PLUS RECONS   Final Result      Degenerative  changes of the lumbar spine without acute fracture or malalignment.      CT-TSPINE W/O PLUS RECONS   Final Result      Degenerative changes of the thoracic spine without acute fracture or malalignment.      CT-CHEST,ABDOMEN,PELVIS WITH   Final Result      1.  Acute, comminuted fracture of the left clavicle near the acromioclavicular joint. No associated rib fractures. No pneumothorax.   2.  No other traumatic injury in the chest, abdomen or pelvis.   3.  A few areas of linear atelectasis/scarring in the lungs.   4.  Borderline enlarged mediastinal lymph nodes, statistically reactive.   5.  Cholelithiasis.   6.  Severe left hydronephrosis with severe left renal cortical atrophy.   7.  Severe left hydroureter, with a 3 mm distal ureteral stone.   8.  Small colonic diverticula.      CT-MAXILLOFACIAL W/O PLUS RECONS   Final Result      Left periorbital soft tissue swelling without underlying acute fracture or malalignment.      CT-CSPINE WITHOUT PLUS RECONS   Final Result      1.  Degenerative changes of the cervical spine without acute fracture or malalignment.   2.  Extensive stranding in the left inferior neck, supraclavicular fossa and left chest wall consistent with contusion/hematoma.      CT-HEAD W/O   Final Result      1.  No evidence of acute territorial infarct, intracranial hemorrhage or mass lesion.   2.  Left periorbital and left parietal scalp soft tissue swelling and hematoma.   3.  Moderate diffuse cerebral substance loss.   4.  Moderate microangiopathic ischemic change versus demyelination or gliosis.             CULTURES:   Results       Procedure Component Value Units Date/Time    URINALYSIS [[id number]]  (Abnormal) Collected: 04/29/23 1040    Order Status: Completed Specimen: Urine, Ruelas Cath Updated: 04/29/23 1120     Color Yellow     Character Cloudy     Specific Gravity 1.019     Ph 5.0     Glucose Negative mg/dL      Ketones Negative mg/dL      Protein 100 mg/dL      Bilirubin Negative      Urobilinogen, Urine 0.2     Nitrite Negative     Leukocyte Esterase Negative     Occult Blood Large     Micro Urine Req Microscopic    Narrative:      Collected By: [id number] MARIA FERNANDA DOUGHERTY    CoV-2, Flu A/B, And RSV by PCR ([facility]) [[id number]] Collected: 04/27/23 1327    Order Status: Completed Specimen: Respirate from Mid-Turbinate Updated: 04/27/23 1447     Influenza virus A RNA Negative     Influenza virus B, PCR Negative     RSV, PCR Negative     SARS-CoV-2 by PCR NotDetected     Comment: [facility] providers: PLEASE REFER TO DE-ESCALATION AND RETESTING PROTOCOL  on [email or web address]    **The [facility] GeneXpert Xpress SARS-CoV-2 RT-PCR Test has been made  available for use under the Emergency Use Authorization (EUA) only.          SARS-CoV-2 Source NP Swab    URINALYSIS (UA) [[id number]]  (Abnormal) Collected: 04/27/23 1336    Order Status: Completed Specimen: Urine Updated: 04/27/23 1411     Color Yellow     Character Clear     Specific Gravity 1.033     Ph 5.0     Glucose Negative mg/dL      Ketones Negative mg/dL      Protein 30 mg/dL      Bilirubin Negative     Urobilinogen, Urine 0.2     Nitrite Negative     Leukocyte Esterase Negative     Occult Blood Trace     Micro Urine Req Microscopic          MEDS:  Current Facility-Administered Medications   Medication Last Admin    scopolamine (TRANSDERM-SCOP) patch 1 Patch 1 Patch at 05/01/23 1050    sodium bicarbonate 150 mEq in D5W infusion (premix) Rate Verify at 05/02/23 0200    ipratropium-albuterol (DUONEB) nebulizer solution 3 mL at 05/02/23 0157    Metoprolol Tartrate (LOPRESSOR) injection 5 mg 5 mg at 04/30/23 1722    HYDROmorphone (Dilaudid) injection 0.5 mg 0.5 mg at 05/01/23 1958    senna-docusate (PERICOLACE or SENOKOT S) 8.6-50 MG per tablet 2 Tablet 2 Tablet at 04/30/23 0446    And    polyethylene glycol/lytes (MIRALAX) PACKET 1 Packet      And    magnesium hydroxide (MILK OF MAGNESIA) suspension 30 mL      And    bisacodyl (DULCOLAX) suppository 10 mg       ondansetron (ZOFRAN) syringe/vial injection 4 mg      ondansetron (ZOFRAN ODT) dispertab 4 mg      acetaminophen (Tylenol) tablet 650 mg 650 mg at 04/28/23 1730       ASSESSMENT/PLAN: 89 y.o. male with a past medical history significant for HC, HTN, CKD3, PVD, prostate CA, S/p bare metal statin LCA (1999) admitted on 4/27/23 for rhabdomyolysis and KRISTY following an unwitnessed fall with an unknown downtime.    A-fib ([facility])  Assessment & Plan  Notified by nursing staff that patient was in A-fib with RVR with heart rate between 100-150.  EKG showed A-fib with ventricular premature complex, RBBB and LAFB.  Patient has no history of A-fib, likely induced from rhabdomyolysis and DKA.  Patient started on IV Lopressor 5 mg every 5 minutes PRN HR> 110.   -Continue Lopressor injection 5 mg every 5 minutes PRN for HR>110  -Continued on telemetry  -Continue to monitor.  If patient remains in A-fib with RVR will consider dilt drip for rate control.  -Started on heparin for anticoagulation therapy      Rhabdomyolysis  Assessment & Plan  CPK elevated in the ED today. Will monitor for fluid overload, electrolyte abnormalities, acidemia, and uremia. Patient was on home statin which could be contributing to myopathy and elevation of CPK. However, more likely elevated CPK from prolonged immobilization after fall.  - CPK continues to increase. Anticipates peak serum concentration within 24-72 hours.  Anticipate decline within 3-5 dfays of cessation of muscle injury.  - CPK decrease from 1477=>1271=>834  - Decrease NS 200mL/hr to 150 mL/hr  - CMP, CPK AM    KRISTY (acute kidney injury) ([facility])  Assessment & Plan  History of CKD 3-4.  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 4/29: BUN 55, Cr 2.04=>2.69=>2.78, baseline 2.3. UA positive for proteinuria. UOP overnight 200mL, PVR 685mL requiring straight cath which yielded 600mL. Repeat PVR 550mL, weinstein placed.  - Weinstein in place  - Decreased  mL/hr tp 150 ml/hr  - Hold  nephrotoxic agents  - Closely monitor CMP  - Cr down to 1.66 from 1.96    - Bicarb normalized, now 20 will discontinue sodium bicarb and will continue to monitor    Cough in adult  Assessment & Plan  Patient reports wet cough for over a week due to viral bronchitis  - CXR showed: Bilateral bronchial wall thickening and basilar atelectasis and/or consolidation. Findings could be seen in setting of acute and/or chronic bronchitis with bronchopneumonia.  Left distal third clavicle fracture.  - WBC 7.6  - Supportive care  - Frequent suctioning due to increased mucus  - After SLP swallow evaluation, recommended liquid diet solids with mildly thick liquids to prevent aspiration    Elevated troponin  Assessment & Plan  Elevated troponin 134 in the ED. Repeat troponin 200, 195. The patient has not complained of chest pain. Anticipate elevated troponin related to rhabdomyolysis in the setting of CKD3 with one kidney.    - Troponin stable  - If patient complains of chest pain will order stat troponins and repeat EKG    Hypertension  Assessment & Plan  Patient has a known history of essential hypertension for which he self medicates losartan as needed.  -We will consider resuming scheduled losartan prior to discharge.   -We will hold now as patient has KRISTY  -PRN antihypertensives are avaliable    Fall  Assessment & Plan  The patient is unable to recall events leading up to the fall and the fall itself but reports falling down stairs. The patient's son said the patient has a cane, however, usually uses objects in his house to lean on. CT Head negative for bleed. CT C/T/L negative for fracture. Clavicle fracture present.   - I spoke to the patient's wife today and she was very clear that she does not want surgical interventions as the patient would not want that if given the ability to express that. Her goals include placing the patient somewhere safe upon discharge.   - PT/OT recommended SNF  - Will have GOC discussion with  family today    Closed fracture of clavicle  Assessment & Plan  Present on imaging today.  - Sling and intermittent ice pack for comfort  - Tylenol PRN for pain. Hold motrin for KRISTY.  - PRN Morphine available for severe pain   - Orthopedic surgery consulted from ED. Will appreciate recommendations.       Lactic acidosis  Assessment & Plan  Resolved    Hypercholesterolemia- (present on admission)  Assessment & Plan  - Hold statin for increasing CPK    Core Measures:   Fluids: NS @ 150 mL/hr  Lines: PIV  Abx: None  DVT prophylaxis: Heparin   Code Status: DNR/DNI    Disposition: Inpatient for IV fluids due to rhabdomyolysis with KRISYT    Valeria Harkins MD   PGY-1 Family Medicine Resident   [facility]

## 2023-05-03 NOTE — CARE PLAN
The patient is Stable - Low risk of patient condition declining or worsening    Shift Goals  Clinical Goals: End of Life care/ comfort  Patient Goals: comfort  Family Goals: comfort    Progress made toward(s) clinical / shift goals:  Pt end of life. PRN meds given for s/s of discomfort (see MAR). Family at bedside. Will continue to monitor.    Patient is not progressing towards the following goals:      Problem: Knowledge Deficit - Standard  Goal: Patient and family/care givers will demonstrate understanding of plan of care, disease process/condition, diagnostic tests and medications  Outcome: Not Met     Problem: Pain - Standard  Goal: Alleviation of pain or a reduction in pain to the patient’s comfort goal  Outcome: Not Met     Problem: Fall Risk  Goal: Patient will remain free from falls  Outcome: Not Met     Problem: Skin Integrity  Goal: Skin integrity is maintained or improved  Outcome: Not Met     Problem: Safety - Medical Restraint  Goal: Remains free of injury from restraints (Restraint for Interference with Medical Device)  Outcome: Not Met  Goal: Free from restraint(s) (Restraint for Interference with Medical Device)  Outcome: Not Met

## 2023-05-03 NOTE — PROGRESS NOTES
Assume care of patient around 0330. Patient on comfort care. Around 0445 this RN noted family member to be out in the hallway looking for staff. Pt's family member stated he thinks patient has passed. This RN and second RN did not note a carotid pulse, breath sounds, heart sounds or pupillary reactivity. 2 RN pronouncement performed per hospital policy. Time of death 0449. NAM notified. Provider Lina Rankin notified at 0717.     Called 's office. Patient will be a medical records review however not a case. Does not meet tissue donation requirement.    More family to come see patient at this time. Report given to day RN.

## 2023-05-11 ENCOUNTER — APPOINTMENT (OUTPATIENT)
Dept: CARDIOLOGY | Facility: MEDICAL CENTER | Age: 88
End: 2023-05-11
Payer: MEDICARE

## 2023-05-15 NOTE — DOCUMENTATION QUERY
"                                                                         [facility]                                                                       Query Response Note      PATIENT:               SAMM ATKINS  ACCT #:                  [id number]  MRN:                     [id number]  :                      [id number]  ADMIT DATE:       2023 10:43 AM  DISCH DATE:        5/3/2023 4:49 AM  RESPONDING  PROVIDER #:        [id number]           QUERY TEXT:    \"A-fib, likely induced from rhabdomyolysis and DKA\" is documented in the Progress Notes and the DC Summary.    Please clarify the status of DKA after further study based on the clinical indicators.    The patient's Clinical Indicators include:   -  Glucose: 160 -> 94 -> 110  -> 104 -> 103 -> 111 -> 95   -  Co2: 18 -> 19 -> 17 -> 16 -> 16 -> 17 -> 13    Ph: 7.39; Pco2: 29.7; Po2: 54.2; Hco3: 17; BE: -6   &  Urine Ketones: Negative     - : PN's: A-fib, likely induced from rhabdomyolysis & DKA.    Treatment: IV fluids; no insulin noted per MAR; lab testing  Risk Factors: Diabetes; rhabdomyolysis    Thank You,  Irene Mckeon RN  Clinical    Connect via [employer] Messenger  Options provided:   -- Diabetic ketoacidosis ruled in, please document additional clinical indicators   -- Diabetic ketoacidosis is ruled out   -- Other explanation, (please specify other explanation)   -- Unable to determine      Query created by: Irene Mckeon on 2023 1:45 PM    RESPONSE TEXT:    Other explanation - rhabdo          Electronically signed by:  YANDEL ETIENNE MD 5/15/2023 2:31 PM              "
